# Patient Record
Sex: MALE | Race: WHITE | NOT HISPANIC OR LATINO
[De-identification: names, ages, dates, MRNs, and addresses within clinical notes are randomized per-mention and may not be internally consistent; named-entity substitution may affect disease eponyms.]

---

## 2019-02-14 ENCOUNTER — APPOINTMENT (OUTPATIENT)
Dept: UROLOGY | Facility: CLINIC | Age: 79
End: 2019-02-14
Payer: MEDICARE

## 2019-02-14 VITALS — TEMPERATURE: 97.5 F | SYSTOLIC BLOOD PRESSURE: 113 MMHG | HEART RATE: 86 BPM | DIASTOLIC BLOOD PRESSURE: 71 MMHG

## 2019-02-14 DIAGNOSIS — F52.21 MALE ERECTILE DISORDER: ICD-10-CM

## 2019-02-14 DIAGNOSIS — I25.10 ATHEROSCLEROTIC HEART DISEASE OF NATIVE CORONARY ARTERY W/OUT ANGINA PECTORIS: ICD-10-CM

## 2019-02-14 DIAGNOSIS — Z78.9 OTHER SPECIFIED HEALTH STATUS: ICD-10-CM

## 2019-02-14 PROCEDURE — 99204 OFFICE O/P NEW MOD 45 MIN: CPT

## 2019-02-14 NOTE — PHYSICAL EXAM
[General Appearance - Well Developed] : well developed [General Appearance - Well Nourished] : well nourished [Normal Appearance] : normal appearance [Well Groomed] : well groomed [General Appearance - In No Acute Distress] : no acute distress [Edema] : no peripheral edema [Respiration, Rhythm And Depth] : normal respiratory rhythm and effort [Exaggerated Use Of Accessory Muscles For Inspiration] : no accessory muscle use [Abdomen Soft] : soft [Abdomen Tenderness] : non-tender [Costovertebral Angle Tenderness] : no ~M costovertebral angle tenderness [Urethral Meatus] : meatus normal [Penis Abnormality] : normal circumcised penis [Urinary Bladder Findings] : the bladder was normal on palpation [Scrotum] : the scrotum was normal [Testes Mass (___cm)] : there were no testicular masses [Rectal Exam - Rectum] : digital rectal exam was normal [No Prostate Nodules] : no prostate nodules [Prostate Size ___ (0-4)] : prostate size [unfilled] (scale: 0-4) [Normal Station and Gait] : the gait and station were normal for the patient's age [FreeTextEntry1] : no spinal or bony tenderness [] : no rash [No Focal Deficits] : no focal deficits [Oriented To Time, Place, And Person] : oriented to person, place, and time [Affect] : the affect was normal [Mood] : the mood was normal [Not Anxious] : not anxious [No Palpable Adenopathy] : no palpable adenopathy

## 2019-02-14 NOTE — REVIEW OF SYSTEMS
[Feeling Tired] : feeling tired [Recent Weight Gain (___ Lbs)] : recent [unfilled] ~Ulb weight gain [Shortness Of Breath] : shortness of breath [Cough] : cough [Pain during urination] : pain during urination [Difficulty Walking] : difficulty walking [Negative] : Heme/Lymph

## 2019-02-14 NOTE — ASSESSMENT
[FreeTextEntry1] : PSA elevated given avodart effect- 5.84 on lab work is more c/w true number aprox 11-12.\par \par We had long discussion about the PSA number, risks of prostate cancer vs. benign disease, and relative risks of a possible prostate cancer vs. proceeding with biopsy or therapeutic intervention given age 79, need for anticoagulation, with sig CKD, CAD, h/o CHF.  \par \par Overall, risks of biopsy and intervention likely higher than risks of observation and conservative management.  Would not recommend aggressive evaluation or management, but would reasonably f/u psa.\par \par Pt and daughter agree, conservative given age, health risks\par Cont dutasteride\par RTC 6 months with f/u and psa

## 2019-02-14 NOTE — HISTORY OF PRESENT ILLNESS
[FreeTextEntry1] : Pt is 78 yo male, last seen > 3 years ago for ED concerns and BPH with obstruction.  Remains on avodart- stopped flomax since last appt.  No f/u since 1/2016.  Now returns because was told psa was elevated. \par \par Recent labs: creatinine 2.0 (egfr 32), PSA 5.84 (on dutasteride).\par \par No voiding complaints at this time; some weakness of the stream.  No dysuria, no hematuria.  Nocturia 1/night. No flank or abdominal pain.\par \par PMH: mult medical issues, including CKD, CAD (s/p 3 stents), htn, CHF as well, skin cancer\par psh: cardiac stenting, previous inguinal hernia repair, excision skin cancer\par nkda\par meds: includes spironolactone, xarelto [Weak Stream] : weak stream

## 2019-05-16 ENCOUNTER — APPOINTMENT (OUTPATIENT)
Dept: HEART AND VASCULAR | Facility: CLINIC | Age: 79
End: 2019-05-16
Payer: MEDICARE

## 2019-05-16 VITALS
DIASTOLIC BLOOD PRESSURE: 70 MMHG | SYSTOLIC BLOOD PRESSURE: 110 MMHG | BODY MASS INDEX: 23.23 KG/M2 | HEIGHT: 67 IN | WEIGHT: 148 LBS

## 2019-05-16 VITALS — SYSTOLIC BLOOD PRESSURE: 110 MMHG | HEART RATE: 70 BPM | DIASTOLIC BLOOD PRESSURE: 70 MMHG

## 2019-05-16 DIAGNOSIS — Z00.00 ENCOUNTER FOR GENERAL ADULT MEDICAL EXAMINATION W/OUT ABNORMAL FINDINGS: ICD-10-CM

## 2019-05-16 PROCEDURE — 93000 ELECTROCARDIOGRAM COMPLETE: CPT

## 2019-05-16 PROCEDURE — 99214 OFFICE O/P EST MOD 30 MIN: CPT

## 2019-05-16 PROCEDURE — 93306 TTE W/DOPPLER COMPLETE: CPT

## 2019-05-16 RX ORDER — RIVAROXABAN 2.5 MG/1
TABLET, FILM COATED ORAL
Refills: 0 | Status: DISCONTINUED | COMMUNITY
End: 2019-05-16

## 2019-05-17 LAB
ANION GAP SERPL CALC-SCNC: 13 MMOL/L
BUN SERPL-MCNC: 24 MG/DL
CALCIUM SERPL-MCNC: 9.6 MG/DL
CHLORIDE SERPL-SCNC: 98 MMOL/L
CO2 SERPL-SCNC: 30 MMOL/L
CREAT SERPL-MCNC: 1.5 MG/DL
GLUCOSE SERPL-MCNC: 244 MG/DL
POTASSIUM SERPL-SCNC: 4.1 MMOL/L
SODIUM SERPL-SCNC: 141 MMOL/L

## 2019-05-20 ENCOUNTER — MOBILE ON CALL (OUTPATIENT)
Age: 79
End: 2019-05-20

## 2019-05-20 LAB — NT-PROBNP SERPL-MCNC: 1545 PG/ML

## 2019-06-13 ENCOUNTER — RESULT CHARGE (OUTPATIENT)
Age: 79
End: 2019-06-13

## 2019-06-13 NOTE — HISTORY OF PRESENT ILLNESS
[FreeTextEntry1] : Patient is an 79-year-old male with a known history of ischemic cardiomyopathy reduced LV function status post MI who has refused a defibrillator in the past. He also has chronic atrial fibrillation diabetes and hyperlipidemia. He presents now with worsening symptoms of cough worse at night when lying in bed associated with shortness of breath. He denies any diaphoresis dizziness lightheadedness syncope or near-syncope. He apparently was recently seen in a local emergency room and had a CAT scan which was suggestive of bronchiectasis and was referred to a pulmonologist for further evaluation. He denies any current symptoms of leg edema or abdominal distention\par Patient has relif of cough with home nebulizer of albuterol

## 2019-06-13 NOTE — ASSESSMENT
[FreeTextEntry1] : No signs of edema on exam \par Has diffuse rales onexam with no JVD \par estrella order BNP and CXr before adding addition Torsemide \par Patient is in need of ICD (refused in past)

## 2019-06-25 ENCOUNTER — APPOINTMENT (OUTPATIENT)
Dept: HEART AND VASCULAR | Facility: CLINIC | Age: 79
End: 2019-06-25
Payer: MEDICARE

## 2019-06-25 VITALS — BODY MASS INDEX: 22.6 KG/M2 | WEIGHT: 144 LBS | HEIGHT: 67 IN

## 2019-06-25 VITALS — OXYGEN SATURATION: 95 %

## 2019-06-25 VITALS — SYSTOLIC BLOOD PRESSURE: 105 MMHG | DIASTOLIC BLOOD PRESSURE: 70 MMHG

## 2019-06-25 PROCEDURE — 93000 ELECTROCARDIOGRAM COMPLETE: CPT

## 2019-06-25 RX ORDER — ALBUTEROL SULFATE 0.63 MG/3ML
0.63 SOLUTION RESPIRATORY (INHALATION)
Refills: 0 | Status: ACTIVE | COMMUNITY

## 2019-06-25 NOTE — HISTORY OF PRESENT ILLNESS
[FreeTextEntry1] : the patient is an elderly man with a known history of ischemic cardiomyopathy who was seen approximately one month ago for cough and shortness of breath. The assessment at that time was more consistent with a primary pulmonary etiology as yet no signs of peripheral edema or other stigmata of CHF. Patient was seen by a pulmonary physician placed on oral antibiotics as well as oral steroids. We subsequently was admitted to [facility] where that treatment was continued apparently there were some fluctuations in his heart rate unclear of the true nature of that problem. His breathing has somewhat improved he uses a home nebulizer 4 times a day with other inhalers. He currently still has moderate dyspnea with walking improved from one month ago.\par renal fx was noted to be elevated

## 2019-06-25 NOTE — PHYSICAL EXAM
[General Appearance - Well Developed] : well developed [Normal Appearance] : normal appearance [General Appearance - Well Nourished] : well nourished [Well Groomed] : well groomed [General Appearance - In No Acute Distress] : no acute distress [No Deformities] : no deformities [Eyelids - No Xanthelasma] : the eyelids demonstrated no xanthelasmas [Normal Conjunctiva] : the conjunctiva exhibited no abnormalities [No Oral Pallor] : no oral pallor [Normal Oral Mucosa] : normal oral mucosa [Normal Jugular Venous V Waves Present] : normal jugular venous V waves present [No Oral Cyanosis] : no oral cyanosis [Normal Jugular Venous A Waves Present] : normal jugular venous A waves present [No Jugular Venous Underwood A Waves] : no jugular venous underwood A waves [Abdomen Soft] : soft [Abdomen Tenderness] : non-tender [Abnormal Walk] : normal gait [Abdomen Mass (___ Cm)] : no abdominal mass palpated [Gait - Sufficient For Exercise Testing] : the gait was sufficient for exercise testing [Normal S1] : normal S1 [Normal S2] : normal S2 [II] : a grade 2 [___ +] : bilateral [unfilled]U+ pretibial pitting edema [Bibasilar Rales/Crackles] : bibasilar rales were heard [Nail Clubbing] : no clubbing of the fingernails [Cyanosis, Localized] : no localized cyanosis [Petechial Hemorrhages (___cm)] : no petechial hemorrhages [Skin Color & Pigmentation] : normal skin color and pigmentation [] : no rash [No Venous Stasis] : no venous stasis [Skin Lesions] : no skin lesions [No Skin Ulcers] : no skin ulcer [No Xanthoma] : no  xanthoma was observed [S4] : no S4 [S3] : no S3

## 2019-06-25 NOTE — ASSESSMENT
[FreeTextEntry1] : \par Patient is in need of ICD (refused in past)\par refused to take aldactone (no clear reason) \par torsemide 20 mg qd

## 2019-06-26 LAB
ANION GAP SERPL CALC-SCNC: 13 MMOL/L
BUN SERPL-MCNC: 31 MG/DL
CALCIUM SERPL-MCNC: 9 MG/DL
CHLORIDE SERPL-SCNC: 100 MMOL/L
CO2 SERPL-SCNC: 26 MMOL/L
CREAT SERPL-MCNC: 1.5 MG/DL
GLUCOSE SERPL-MCNC: 296 MG/DL
POTASSIUM SERPL-SCNC: 4.3 MMOL/L
SODIUM SERPL-SCNC: 139 MMOL/L

## 2019-08-19 ENCOUNTER — APPOINTMENT (OUTPATIENT)
Dept: HEART AND VASCULAR | Facility: CLINIC | Age: 79
End: 2019-08-19
Payer: MEDICARE

## 2019-08-19 VITALS — BODY MASS INDEX: 8.32 KG/M2 | DIASTOLIC BLOOD PRESSURE: 60 MMHG | HEIGHT: 78 IN | SYSTOLIC BLOOD PRESSURE: 90 MMHG

## 2019-08-19 VITALS — WEIGHT: 138 LBS | BODY MASS INDEX: 21.66 KG/M2 | HEIGHT: 67 IN

## 2019-08-19 VITALS — OXYGEN SATURATION: 92 %

## 2019-08-19 PROCEDURE — 93000 ELECTROCARDIOGRAM COMPLETE: CPT

## 2019-08-20 LAB
ANION GAP SERPL CALC-SCNC: 11 MMOL/L
BUN SERPL-MCNC: 25 MG/DL
CALCIUM SERPL-MCNC: 9.7 MG/DL
CHLORIDE SERPL-SCNC: 98 MMOL/L
CO2 SERPL-SCNC: 32 MMOL/L
CREAT SERPL-MCNC: 1.6 MG/DL
GLUCOSE SERPL-MCNC: 153 MG/DL
NT-PROBNP SERPL-MCNC: 2371 PG/ML
POTASSIUM SERPL-SCNC: 4 MMOL/L
SODIUM SERPL-SCNC: 141 MMOL/L

## 2019-08-20 NOTE — ASSESSMENT
[FreeTextEntry1] : \par combination of copd and chf \par Low Bp at present \par would keep on torsemide at 20 mg qd \par spoke to Pulm Dr Cisneros about futher optimizing COPD condition \par he will add inhaler\par cxr ordered \par lytes w creat ordered \par may consider ACE or ARB if bp can tolerate

## 2019-08-20 NOTE — PHYSICAL EXAM
[General Appearance - Well Developed] : well developed [Normal Appearance] : normal appearance [Well Groomed] : well groomed [General Appearance - Well Nourished] : well nourished [No Deformities] : no deformities [General Appearance - In No Acute Distress] : no acute distress [Normal Conjunctiva] : the conjunctiva exhibited no abnormalities [Eyelids - No Xanthelasma] : the eyelids demonstrated no xanthelasmas [Normal Oral Mucosa] : normal oral mucosa [No Oral Cyanosis] : no oral cyanosis [No Oral Pallor] : no oral pallor [Normal Jugular Venous A Waves Present] : normal jugular venous A waves present [Normal Jugular Venous V Waves Present] : normal jugular venous V waves present [No Jugular Venous Underwood A Waves] : no jugular venous underwood A waves [Abdomen Soft] : soft [Abdomen Tenderness] : non-tender [Abdomen Mass (___ Cm)] : no abdominal mass palpated [Abnormal Walk] : normal gait [Gait - Sufficient For Exercise Testing] : the gait was sufficient for exercise testing [Cyanosis, Localized] : no localized cyanosis [Nail Clubbing] : no clubbing of the fingernails [Petechial Hemorrhages (___cm)] : no petechial hemorrhages [Skin Color & Pigmentation] : normal skin color and pigmentation [] : no rash [No Venous Stasis] : no venous stasis [No Skin Ulcers] : no skin ulcer [Skin Lesions] : no skin lesions [Normal S1] : normal S1 [No Xanthoma] : no  xanthoma was observed [Normal S2] : normal S2 [II] : a grade 2 [___ +] : bilateral [unfilled]U+ pretibial pitting edema [Bibasilar Rales/Crackles] : bibasilar rales were heard [S3] : no S3 [S4] : no S4

## 2019-08-20 NOTE — HISTORY OF PRESENT ILLNESS
[FreeTextEntry1] : 79-year-old male with known ischemic myopathy as well as emphysema status post admission to [facility] for what appears to be cough and shortness of breath unclear where he was treated for the air his x-ray was consistent with COPD and bilateral L. atelectasis no congestive changes were noted.ON torsemide BID lost 10 lbs

## 2019-08-21 ENCOUNTER — APPOINTMENT (OUTPATIENT)
Age: 79
End: 2019-08-21
Payer: MEDICARE

## 2019-08-21 PROCEDURE — 99213 OFFICE O/P EST LOW 20 MIN: CPT

## 2019-08-21 NOTE — PHYSICAL EXAM
[General Appearance - Well Developed] : well developed [Normal Appearance] : normal appearance [General Appearance - Well Nourished] : well nourished [General Appearance - In No Acute Distress] : no acute distress [Well Groomed] : well groomed [Costovertebral Angle Tenderness] : no ~M costovertebral angle tenderness [Abdomen Soft] : soft [Abdomen Tenderness] : non-tender [Respiration, Rhythm And Depth] : normal respiratory rhythm and effort [] : no respiratory distress [Oriented To Time, Place, And Person] : oriented to person, place, and time [Exaggerated Use Of Accessory Muscles For Inspiration] : no accessory muscle use [Affect] : the affect was normal [Not Anxious] : not anxious [Mood] : the mood was normal [Normal Station and Gait] : the gait and station were normal for the patient's age [No Focal Deficits] : no focal deficits

## 2019-08-21 NOTE — HISTORY OF PRESENT ILLNESS
[FreeTextEntry1] : Pt returns in 6 month f/u---> now 80 yo male.  Doing well, though has some new lower leg swelling.  H/o BPH with incomplete emptying, currently on dutasteride and alfuzosin.  [facility] told he has 'lung inflammation'.\par \par O/w voiding well at this time.\par \par Creatinine recently 1.6 8/19/19, and at [facility]---> improved compared with 6 months ago.\par \par No recent PSA f/u, though previously elevated for age. [None] : no symptoms

## 2019-08-21 NOTE — ASSESSMENT
[FreeTextEntry1] : D/w pt- will check PSA.\par \par Offered to do today, or with next draw.\par \par WIll f/u by phone.\par \par RTC 6 months with u/a, bladder scan, full exam, and will repeat psa at that time.

## 2019-09-03 ENCOUNTER — APPOINTMENT (OUTPATIENT)
Dept: HEART AND VASCULAR | Facility: CLINIC | Age: 79
End: 2019-09-03

## 2019-09-03 LAB
PSA FREE FLD-MCNC: 19 %
PSA FREE SERPL-MCNC: 1.06 NG/ML
PSA SERPL-MCNC: 5.73 NG/ML

## 2019-09-10 ENCOUNTER — APPOINTMENT (OUTPATIENT)
Dept: HEART AND VASCULAR | Facility: CLINIC | Age: 79
End: 2019-09-10
Payer: MEDICARE

## 2019-09-10 ENCOUNTER — NON-APPOINTMENT (OUTPATIENT)
Age: 79
End: 2019-09-10

## 2019-09-10 VITALS
OXYGEN SATURATION: 93 % | SYSTOLIC BLOOD PRESSURE: 95 MMHG | DIASTOLIC BLOOD PRESSURE: 60 MMHG | HEART RATE: 93 BPM | RESPIRATION RATE: 14 BRPM

## 2019-09-10 VITALS — WEIGHT: 135 LBS | BODY MASS INDEX: 21.19 KG/M2 | HEIGHT: 67 IN

## 2019-09-10 DIAGNOSIS — J47.9 BRONCHIECTASIS, UNCOMPLICATED: ICD-10-CM

## 2019-09-10 PROCEDURE — 93000 ELECTROCARDIOGRAM COMPLETE: CPT

## 2019-09-10 PROCEDURE — 99214 OFFICE O/P EST MOD 30 MIN: CPT

## 2019-09-10 NOTE — PHYSICAL EXAM
[General Appearance - Well Developed] : well developed [Normal Appearance] : normal appearance [Well Groomed] : well groomed [General Appearance - Well Nourished] : well nourished [No Deformities] : no deformities [General Appearance - In No Acute Distress] : no acute distress [Normal Conjunctiva] : the conjunctiva exhibited no abnormalities [Eyelids - No Xanthelasma] : the eyelids demonstrated no xanthelasmas [Normal Oral Mucosa] : normal oral mucosa [No Oral Pallor] : no oral pallor [No Oral Cyanosis] : no oral cyanosis [Normal Jugular Venous A Waves Present] : normal jugular venous A waves present [Normal Jugular Venous V Waves Present] : normal jugular venous V waves present [No Jugular Venous Underwood A Waves] : no jugular venous underwood A waves [Abdomen Soft] : soft [Abdomen Tenderness] : non-tender [Abdomen Mass (___ Cm)] : no abdominal mass palpated [Abnormal Walk] : normal gait [Gait - Sufficient For Exercise Testing] : the gait was sufficient for exercise testing [Nail Clubbing] : no clubbing of the fingernails [Cyanosis, Localized] : no localized cyanosis [Petechial Hemorrhages (___cm)] : no petechial hemorrhages [Skin Color & Pigmentation] : normal skin color and pigmentation [] : no rash [No Venous Stasis] : no venous stasis [Skin Lesions] : no skin lesions [No Skin Ulcers] : no skin ulcer [No Xanthoma] : no  xanthoma was observed [Normal S1] : normal S1 [Normal S2] : normal S2 [II] : a grade 2 [___ +] : bilateral [unfilled]U+ pretibial pitting edema [Bibasilar Rales/Crackles] : bibasilar rales were heard [S4] : no S4 [S3] : no S3

## 2019-09-10 NOTE — ASSESSMENT
[FreeTextEntry1] : \par combination of copd and chf \par Low Bp at present \par would keep on torsemide at 20 mg qd \par \par \par lytes w creat ordered \par may consider ACE or ARB if bp can tolerate

## 2019-09-10 NOTE — HISTORY OF PRESENT ILLNESS
[FreeTextEntry1] : 79-year-old male with known history of ischemic coronary myopathy chronic atrial fibrillation and bronchiectasis who returns for routine followup. He was recently placed on some inhalers which have helped him with his cough and dyspnea. He is approximately one and a half pillow orthopnea he denies any edema he denies any chest pain or palpitations. Blood pressure at home has been a marginal level with rare episodes of dizziness

## 2019-09-11 LAB
ANION GAP SERPL CALC-SCNC: 18 MMOL/L
BUN SERPL-MCNC: 25 MG/DL
CALCIUM SERPL-MCNC: 9.7 MG/DL
CHLORIDE SERPL-SCNC: 99 MMOL/L
CO2 SERPL-SCNC: 25 MMOL/L
CREAT SERPL-MCNC: 1.36 MG/DL
GLUCOSE SERPL-MCNC: 169 MG/DL
NT-PROBNP SERPL-MCNC: 1591 PG/ML
POTASSIUM SERPL-SCNC: 4.2 MMOL/L
SODIUM SERPL-SCNC: 142 MMOL/L

## 2019-10-08 ENCOUNTER — APPOINTMENT (OUTPATIENT)
Dept: HEART AND VASCULAR | Facility: CLINIC | Age: 79
End: 2019-10-08

## 2019-12-12 ENCOUNTER — APPOINTMENT (OUTPATIENT)
Dept: HEART AND VASCULAR | Facility: CLINIC | Age: 79
End: 2019-12-12

## 2019-12-17 ENCOUNTER — APPOINTMENT (OUTPATIENT)
Dept: UROLOGY | Facility: CLINIC | Age: 79
End: 2019-12-17
Payer: MEDICARE

## 2019-12-17 DIAGNOSIS — R97.20 ELEVATED PROSTATE, SPECIFIC ANTIGEN [PSA]: ICD-10-CM

## 2019-12-17 LAB
BILIRUB UR QL STRIP: NORMAL
CLARITY UR: CLEAR
COLLECTION METHOD: NORMAL
GLUCOSE UR-MCNC: NORMAL
HCG UR QL: 0.2 EU/DL
HGB UR QL STRIP.AUTO: NORMAL
KETONES UR-MCNC: NORMAL
LEUKOCYTE ESTERASE UR QL STRIP: NORMAL
NITRITE UR QL STRIP: NORMAL
PH UR STRIP: 5.5
PROT UR STRIP-MCNC: NORMAL
SP GR UR STRIP: 1.01

## 2019-12-17 PROCEDURE — 51798 US URINE CAPACITY MEASURE: CPT

## 2019-12-17 PROCEDURE — 99214 OFFICE O/P EST MOD 30 MIN: CPT | Mod: 25

## 2019-12-17 PROCEDURE — 81003 URINALYSIS AUTO W/O SCOPE: CPT | Mod: QW

## 2019-12-17 NOTE — PHYSICAL EXAM
[General Appearance - Well Developed] : well developed [General Appearance - Well Nourished] : well nourished [Normal Appearance] : normal appearance [Well Groomed] : well groomed [Abdomen Soft] : soft [General Appearance - In No Acute Distress] : no acute distress [Abdomen Tenderness] : non-tender [Costovertebral Angle Tenderness] : no ~M costovertebral angle tenderness [] : no respiratory distress [Edema] : no peripheral edema [Respiration, Rhythm And Depth] : normal respiratory rhythm and effort [Exaggerated Use Of Accessory Muscles For Inspiration] : no accessory muscle use [Mood] : the mood was normal [Affect] : the affect was normal [Oriented To Time, Place, And Person] : oriented to person, place, and time [Not Anxious] : not anxious [Normal Station and Gait] : the gait and station were normal for the patient's age [No Focal Deficits] : no focal deficits [Urethral Meatus] : meatus normal [Penis Abnormality] : normal circumcised penis [Urinary Bladder Findings] : the bladder was normal on palpation [Scrotum] : the scrotum was normal [Testes Mass (___cm)] : there were no testicular masses [No Prostate Nodules] : no prostate nodules [Rectal Exam - Rectum] : digital rectal exam was normal [Prostate Size ___ (0-4)] : prostate size [unfilled] (scale: 0-4) [FreeTextEntry1] : no nodules or induration

## 2019-12-17 NOTE — ASSESSMENT
[FreeTextEntry1] : u/a: wnl today\par bladder scan: 0 cc pvr\par \par 1. repeat PSA today as pt prefers to know if any changes given last number, though I have assured him that 5.73 is wnl for age related range for prostate\par 2. cont on dutasteride, alfuzosin\par 3. RTC 6 months with next eval

## 2019-12-17 NOTE — HISTORY OF PRESENT ILLNESS
[None] : no symptoms [FreeTextEntry1] : Pt returns in ~4 months f/u---> now 78 yo male.  Doing well, though has some new lower leg swelling.  H/o BPH with incomplete emptying, currently on dutasteride and alfuzosin. \par \par O/w voiding well at this time, notes occasional burning sensation  Feels he is emptying well.\par \par Creatinine recently 1.6 8/19/19, and at [facility]---> improved compared with 6 months ago.\par \par PSA hx: 5.73 --- 9/3/19 (free 19%) \par \par Feels alfuzosin and dutasteride 'helping'.

## 2019-12-18 LAB — PSA SERPL-MCNC: 4.03 NG/ML

## 2019-12-30 ENCOUNTER — APPOINTMENT (OUTPATIENT)
Dept: HEART AND VASCULAR | Facility: CLINIC | Age: 79
End: 2019-12-30

## 2020-01-07 ENCOUNTER — APPOINTMENT (OUTPATIENT)
Dept: HEART AND VASCULAR | Facility: CLINIC | Age: 80
End: 2020-01-07

## 2020-02-05 ENCOUNTER — RX RENEWAL (OUTPATIENT)
Age: 80
End: 2020-02-05

## 2020-02-26 ENCOUNTER — APPOINTMENT (OUTPATIENT)
Dept: UROLOGY | Facility: CLINIC | Age: 80
End: 2020-02-26

## 2020-07-23 ENCOUNTER — APPOINTMENT (OUTPATIENT)
Dept: UROLOGY | Facility: CLINIC | Age: 80
End: 2020-07-23

## 2021-01-06 ENCOUNTER — APPOINTMENT (OUTPATIENT)
Dept: HEART AND VASCULAR | Facility: CLINIC | Age: 81
End: 2021-01-06
Payer: MEDICARE

## 2021-01-06 DIAGNOSIS — I48.91 UNSPECIFIED ATRIAL FIBRILLATION: ICD-10-CM

## 2021-01-06 DIAGNOSIS — I25.5 ISCHEMIC CARDIOMYOPATHY: ICD-10-CM

## 2021-01-06 DIAGNOSIS — N18.30 CHRONIC KIDNEY DISEASE, STAGE 3 UNSPECIFIED: ICD-10-CM

## 2021-01-06 DIAGNOSIS — N40.1 BENIGN PROSTATIC HYPERPLASIA WITH LOWER URINARY TRACT SYMPMS: ICD-10-CM

## 2021-01-06 DIAGNOSIS — E11.9 TYPE 2 DIABETES MELLITUS W/OUT COMPLICATIONS: ICD-10-CM

## 2021-01-06 DIAGNOSIS — N13.8 BENIGN PROSTATIC HYPERPLASIA WITH LOWER URINARY TRACT SYMPMS: ICD-10-CM

## 2021-01-06 PROCEDURE — 99215 OFFICE O/P EST HI 40 MIN: CPT | Mod: 95

## 2021-01-06 RX ORDER — RIVAROXABAN 15 MG/1
15 TABLET, FILM COATED ORAL
Qty: 90 | Refills: 3 | Status: ACTIVE | COMMUNITY

## 2021-01-06 NOTE — ASSESSMENT
[FreeTextEntry1] : Assessment \par CHF appears decompensated on history and limited exam \par Will incresae toremide to bid (on empty stomach) \par will need blood work to follow CKD \par will ask to get appt in office for regular f/u

## 2021-01-06 NOTE — HISTORY OF PRESENT ILLNESS
[FreeTextEntry1] : Seen in past for chf (ischemic )and  AFIB \par Has been home bound and has increase in sob iand edema \par Has 2 pill ow orthopnea and cough at nite \par no sscp or neck pain \par leg edema worse over 2 mos \par does not have scale a home \par on torsemide 20 mg  qd \par uses ntg prn for chest pain and palpation \par uses ntg -12 x qweek \par compliant w xarelto \par Had covid in 4/2020 ( 3days)and was in hospital  for pneumonia AB status pos for AB  [Home] : at home, [unfilled] , at the time of the visit. [Medical Office: ([facility])___] : at the medical office located in  [Family Member] : family member [Verbal consent obtained from patient] : the patient, [unfilled]

## 2021-01-06 NOTE — PHYSICAL EXAM
[Normal Conjunctiva] : the conjunctiva exhibited no abnormalities [Eyelids - No Xanthelasma] : the eyelids demonstrated no xanthelasmas [Normal Oral Mucosa] : normal oral mucosa [No Oral Pallor] : no oral pallor [No Oral Cyanosis] : no oral cyanosis [Normal Jugular Venous A Waves Present] : normal jugular venous A waves present [Normal Jugular Venous V Waves Present] : normal jugular venous V waves present [No Jugular Venous Underwood A Waves] : no jugular venous underwood A waves [] : no respiratory distress [Respiration, Rhythm And Depth] : normal respiratory rhythm and effort [Exaggerated Use Of Accessory Muscles For Inspiration] : no accessory muscle use [FreeTextEntry1] : 3 + edema bilat

## 2021-01-20 ENCOUNTER — RX RENEWAL (OUTPATIENT)
Age: 81
End: 2021-01-20

## 2021-01-25 ENCOUNTER — APPOINTMENT (OUTPATIENT)
Dept: HEART AND VASCULAR | Facility: CLINIC | Age: 81
End: 2021-01-25

## 2022-04-14 ENCOUNTER — APPOINTMENT (OUTPATIENT)
Dept: UROLOGY | Facility: CLINIC | Age: 82
End: 2022-04-14

## 2022-12-27 ENCOUNTER — APPOINTMENT (OUTPATIENT)
Dept: HEART AND VASCULAR | Facility: CLINIC | Age: 82
End: 2022-12-27

## 2023-02-12 ENCOUNTER — INPATIENT (INPATIENT)
Facility: HOSPITAL | Age: 83
LOS: 5 days | Discharge: HOME HEALTH | End: 2023-02-18
Attending: STUDENT IN AN ORGANIZED HEALTH CARE EDUCATION/TRAINING PROGRAM | Admitting: STUDENT IN AN ORGANIZED HEALTH CARE EDUCATION/TRAINING PROGRAM
Payer: MEDICARE

## 2023-02-12 VITALS
HEART RATE: 130 BPM | SYSTOLIC BLOOD PRESSURE: 115 MMHG | RESPIRATION RATE: 36 BRPM | DIASTOLIC BLOOD PRESSURE: 73 MMHG | OXYGEN SATURATION: 93 %

## 2023-02-12 DIAGNOSIS — Z29.9 ENCOUNTER FOR PROPHYLACTIC MEASURES, UNSPECIFIED: ICD-10-CM

## 2023-02-12 DIAGNOSIS — I48.91 UNSPECIFIED ATRIAL FIBRILLATION: ICD-10-CM

## 2023-02-12 DIAGNOSIS — J96.01 ACUTE RESPIRATORY FAILURE WITH HYPOXIA: ICD-10-CM

## 2023-02-12 DIAGNOSIS — I10 ESSENTIAL (PRIMARY) HYPERTENSION: ICD-10-CM

## 2023-02-12 DIAGNOSIS — J18.9 PNEUMONIA, UNSPECIFIED ORGANISM: ICD-10-CM

## 2023-02-12 DIAGNOSIS — J44.9 CHRONIC OBSTRUCTIVE PULMONARY DISEASE, UNSPECIFIED: ICD-10-CM

## 2023-02-12 DIAGNOSIS — I50.23 ACUTE ON CHRONIC SYSTOLIC (CONGESTIVE) HEART FAILURE: ICD-10-CM

## 2023-02-12 DIAGNOSIS — E11.9 TYPE 2 DIABETES MELLITUS WITHOUT COMPLICATIONS: ICD-10-CM

## 2023-02-12 DIAGNOSIS — I50.9 HEART FAILURE, UNSPECIFIED: ICD-10-CM

## 2023-02-12 DIAGNOSIS — N17.9 ACUTE KIDNEY FAILURE, UNSPECIFIED: ICD-10-CM

## 2023-02-12 LAB
A1C WITH ESTIMATED AVERAGE GLUCOSE RESULT: 7.7 % — HIGH (ref 4–5.6)
ALBUMIN SERPL ELPH-MCNC: 3.4 G/DL — SIGNIFICANT CHANGE UP (ref 3.3–5)
ALP SERPL-CCNC: 94 U/L — SIGNIFICANT CHANGE UP (ref 40–120)
ALT FLD-CCNC: 14 U/L — SIGNIFICANT CHANGE UP (ref 4–41)
ANION GAP SERPL CALC-SCNC: 12 MMOL/L — SIGNIFICANT CHANGE UP (ref 7–14)
ANION GAP SERPL CALC-SCNC: 15 MMOL/L — HIGH (ref 7–14)
ANION GAP SERPL CALC-SCNC: 15 MMOL/L — HIGH (ref 7–14)
ANION GAP SERPL CALC-SCNC: 18 MMOL/L — HIGH (ref 7–14)
APPEARANCE UR: CLEAR — SIGNIFICANT CHANGE UP
APTT BLD: 40.5 SEC — HIGH (ref 27–36.3)
AST SERPL-CCNC: 41 U/L — HIGH (ref 4–40)
BACTERIA # UR AUTO: NEGATIVE — SIGNIFICANT CHANGE UP
BASE EXCESS BLDV CALC-SCNC: -0.6 MMOL/L — SIGNIFICANT CHANGE UP (ref -2–3)
BASE EXCESS BLDV CALC-SCNC: 0.1 MMOL/L — SIGNIFICANT CHANGE UP (ref -2–3)
BASE EXCESS BLDV CALC-SCNC: 1 MMOL/L — SIGNIFICANT CHANGE UP (ref -2–3)
BASE EXCESS BLDV CALC-SCNC: 2 MMOL/L — SIGNIFICANT CHANGE UP (ref -2–3)
BASOPHILS # BLD AUTO: 0.03 K/UL — SIGNIFICANT CHANGE UP (ref 0–0.2)
BASOPHILS NFR BLD AUTO: 0.2 % — SIGNIFICANT CHANGE UP (ref 0–2)
BILIRUB SERPL-MCNC: 0.8 MG/DL — SIGNIFICANT CHANGE UP (ref 0.2–1.2)
BILIRUB UR-MCNC: NEGATIVE — SIGNIFICANT CHANGE UP
BLOOD GAS VENOUS COMPREHENSIVE RESULT: SIGNIFICANT CHANGE UP
BUN SERPL-MCNC: 68 MG/DL — HIGH (ref 7–23)
BUN SERPL-MCNC: 71 MG/DL — HIGH (ref 7–23)
BUN SERPL-MCNC: 77 MG/DL — HIGH (ref 7–23)
BUN SERPL-MCNC: 80 MG/DL — HIGH (ref 7–23)
CALCIUM SERPL-MCNC: 8.1 MG/DL — LOW (ref 8.4–10.5)
CALCIUM SERPL-MCNC: 9.1 MG/DL — SIGNIFICANT CHANGE UP (ref 8.4–10.5)
CALCIUM SERPL-MCNC: 9.3 MG/DL — SIGNIFICANT CHANGE UP (ref 8.4–10.5)
CALCIUM SERPL-MCNC: 9.4 MG/DL — SIGNIFICANT CHANGE UP (ref 8.4–10.5)
CHLORIDE BLDV-SCNC: 101 MMOL/L — SIGNIFICANT CHANGE UP (ref 96–108)
CHLORIDE BLDV-SCNC: 101 MMOL/L — SIGNIFICANT CHANGE UP (ref 96–108)
CHLORIDE BLDV-SCNC: 103 MMOL/L — SIGNIFICANT CHANGE UP (ref 96–108)
CHLORIDE BLDV-SCNC: 104 MMOL/L — SIGNIFICANT CHANGE UP (ref 96–108)
CHLORIDE SERPL-SCNC: 97 MMOL/L — LOW (ref 98–107)
CHLORIDE SERPL-SCNC: 98 MMOL/L — SIGNIFICANT CHANGE UP (ref 98–107)
CHLORIDE SERPL-SCNC: 98 MMOL/L — SIGNIFICANT CHANGE UP (ref 98–107)
CHLORIDE SERPL-SCNC: 99 MMOL/L — SIGNIFICANT CHANGE UP (ref 98–107)
CO2 BLDV-SCNC: 29.9 MMOL/L — HIGH (ref 22–26)
CO2 BLDV-SCNC: 30.3 MMOL/L — HIGH (ref 22–26)
CO2 BLDV-SCNC: 31 MMOL/L — HIGH (ref 22–26)
CO2 BLDV-SCNC: 33.2 MMOL/L — HIGH (ref 22–26)
CO2 SERPL-SCNC: 19 MMOL/L — LOW (ref 22–31)
CO2 SERPL-SCNC: 22 MMOL/L — SIGNIFICANT CHANGE UP (ref 22–31)
CO2 SERPL-SCNC: 24 MMOL/L — SIGNIFICANT CHANGE UP (ref 22–31)
CO2 SERPL-SCNC: 24 MMOL/L — SIGNIFICANT CHANGE UP (ref 22–31)
COLOR SPEC: SIGNIFICANT CHANGE UP
CREAT SERPL-MCNC: 2.22 MG/DL — HIGH (ref 0.5–1.3)
CREAT SERPL-MCNC: 2.5 MG/DL — HIGH (ref 0.5–1.3)
CREAT SERPL-MCNC: 2.65 MG/DL — HIGH (ref 0.5–1.3)
CREAT SERPL-MCNC: 2.78 MG/DL — HIGH (ref 0.5–1.3)
DIFF PNL FLD: NEGATIVE — SIGNIFICANT CHANGE UP
DIGOXIN SERPL-MCNC: 0.7 NG/ML — LOW (ref 0.8–2)
EGFR: 22 ML/MIN/1.73M2 — LOW
EGFR: 23 ML/MIN/1.73M2 — LOW
EGFR: 25 ML/MIN/1.73M2 — LOW
EGFR: 28 ML/MIN/1.73M2 — LOW
EOSINOPHIL # BLD AUTO: 0.01 K/UL — SIGNIFICANT CHANGE UP (ref 0–0.5)
EOSINOPHIL NFR BLD AUTO: 0.1 % — SIGNIFICANT CHANGE UP (ref 0–6)
EPI CELLS # UR: 2 /HPF — SIGNIFICANT CHANGE UP (ref 0–5)
ESTIMATED AVERAGE GLUCOSE: 174 — SIGNIFICANT CHANGE UP
FLUAV AG NPH QL: SIGNIFICANT CHANGE UP
FLUBV AG NPH QL: SIGNIFICANT CHANGE UP
GAS PNL BLDV: 135 MMOL/L — LOW (ref 136–145)
GAS PNL BLDV: 135 MMOL/L — LOW (ref 136–145)
GAS PNL BLDV: 136 MMOL/L — SIGNIFICANT CHANGE UP (ref 136–145)
GAS PNL BLDV: 138 MMOL/L — SIGNIFICANT CHANGE UP (ref 136–145)
GLUCOSE BLDV-MCNC: 255 MG/DL — HIGH (ref 70–99)
GLUCOSE BLDV-MCNC: 262 MG/DL — HIGH (ref 70–99)
GLUCOSE BLDV-MCNC: 279 MG/DL — HIGH (ref 70–99)
GLUCOSE BLDV-MCNC: 296 MG/DL — HIGH (ref 70–99)
GLUCOSE SERPL-MCNC: 240 MG/DL — HIGH (ref 70–99)
GLUCOSE SERPL-MCNC: 245 MG/DL — HIGH (ref 70–99)
GLUCOSE SERPL-MCNC: 258 MG/DL — HIGH (ref 70–99)
GLUCOSE SERPL-MCNC: 267 MG/DL — HIGH (ref 70–99)
GLUCOSE UR QL: NEGATIVE — SIGNIFICANT CHANGE UP
GRAM STN FLD: SIGNIFICANT CHANGE UP
HCO3 BLDV-SCNC: 28 MMOL/L — SIGNIFICANT CHANGE UP (ref 22–29)
HCO3 BLDV-SCNC: 28 MMOL/L — SIGNIFICANT CHANGE UP (ref 22–29)
HCO3 BLDV-SCNC: 29 MMOL/L — SIGNIFICANT CHANGE UP (ref 22–29)
HCO3 BLDV-SCNC: 31 MMOL/L — HIGH (ref 22–29)
HCT VFR BLD CALC: 36.1 % — LOW (ref 39–50)
HCT VFR BLDA CALC: 32 % — LOW (ref 39–51)
HCT VFR BLDA CALC: 33 % — LOW (ref 39–51)
HCT VFR BLDA CALC: 34 % — LOW (ref 39–51)
HCT VFR BLDA CALC: 36 % — LOW (ref 39–51)
HGB BLD CALC-MCNC: 10.6 G/DL — LOW (ref 12.6–17.4)
HGB BLD CALC-MCNC: 10.9 G/DL — LOW (ref 12.6–17.4)
HGB BLD CALC-MCNC: 11.4 G/DL — LOW (ref 12.6–17.4)
HGB BLD CALC-MCNC: 12 G/DL — LOW (ref 12.6–17.4)
HGB BLD-MCNC: 10.5 G/DL — LOW (ref 13–17)
HYALINE CASTS # UR AUTO: 1 /LPF — SIGNIFICANT CHANGE UP (ref 0–7)
IANC: 16.61 K/UL — HIGH (ref 1.8–7.4)
IMM GRANULOCYTES NFR BLD AUTO: 0.6 % — SIGNIFICANT CHANGE UP (ref 0–0.9)
INR BLD: 1.27 RATIO — HIGH (ref 0.88–1.16)
KETONES UR-MCNC: NEGATIVE — SIGNIFICANT CHANGE UP
LACTATE BLDV-MCNC: 1.3 MMOL/L — SIGNIFICANT CHANGE UP (ref 0.5–2)
LACTATE BLDV-MCNC: 1.5 MMOL/L — SIGNIFICANT CHANGE UP (ref 0.5–2)
LACTATE BLDV-MCNC: 1.8 MMOL/L — SIGNIFICANT CHANGE UP (ref 0.5–2)
LACTATE BLDV-MCNC: 2.6 MMOL/L — HIGH (ref 0.5–2)
LEUKOCYTE ESTERASE UR-ACNC: ABNORMAL
LYMPHOCYTES # BLD AUTO: 1.63 K/UL — SIGNIFICANT CHANGE UP (ref 1–3.3)
LYMPHOCYTES # BLD AUTO: 8.3 % — LOW (ref 13–44)
MAGNESIUM SERPL-MCNC: 2.6 MG/DL — SIGNIFICANT CHANGE UP (ref 1.6–2.6)
MCHC RBC-ENTMCNC: 28 PG — SIGNIFICANT CHANGE UP (ref 27–34)
MCHC RBC-ENTMCNC: 29.1 GM/DL — LOW (ref 32–36)
MCV RBC AUTO: 96.3 FL — SIGNIFICANT CHANGE UP (ref 80–100)
MONOCYTES # BLD AUTO: 1.34 K/UL — HIGH (ref 0–0.9)
MONOCYTES NFR BLD AUTO: 6.8 % — SIGNIFICANT CHANGE UP (ref 2–14)
NEUTROPHILS # BLD AUTO: 16.61 K/UL — HIGH (ref 1.8–7.4)
NEUTROPHILS NFR BLD AUTO: 84 % — HIGH (ref 43–77)
NITRITE UR-MCNC: NEGATIVE — SIGNIFICANT CHANGE UP
NRBC # BLD: 0 /100 WBCS — SIGNIFICANT CHANGE UP (ref 0–0)
NRBC # FLD: 0 K/UL — SIGNIFICANT CHANGE UP (ref 0–0)
NT-PROBNP SERPL-SCNC: HIGH PG/ML
PCO2 BLDV: 56 MMHG — HIGH (ref 42–55)
PCO2 BLDV: 58 MMHG — HIGH (ref 42–55)
PCO2 BLDV: 69 MMHG — HIGH (ref 42–55)
PCO2 BLDV: 84 MMHG — HIGH (ref 42–55)
PH BLDV: 7.17 — LOW (ref 7.32–7.43)
PH BLDV: 7.22 — LOW (ref 7.32–7.43)
PH BLDV: 7.31 — LOW (ref 7.32–7.43)
PH BLDV: 7.31 — LOW (ref 7.32–7.43)
PH UR: 6 — SIGNIFICANT CHANGE UP (ref 5–8)
PHOSPHATE SERPL-MCNC: 6.9 MG/DL — HIGH (ref 2.5–4.5)
PLATELET # BLD AUTO: 311 K/UL — SIGNIFICANT CHANGE UP (ref 150–400)
PO2 BLDV: 44 MMHG — SIGNIFICANT CHANGE UP (ref 25–45)
PO2 BLDV: 50 MMHG — HIGH (ref 25–45)
PO2 BLDV: 66 MMHG — HIGH (ref 25–45)
PO2 BLDV: 71 MMHG — HIGH (ref 25–45)
POTASSIUM BLDV-SCNC: 5 MMOL/L — SIGNIFICANT CHANGE UP (ref 3.5–5.1)
POTASSIUM BLDV-SCNC: 5.8 MMOL/L — HIGH (ref 3.5–5.1)
POTASSIUM BLDV-SCNC: 6.6 MMOL/L — CRITICAL HIGH (ref 3.5–5.1)
POTASSIUM BLDV-SCNC: 7.3 MMOL/L — CRITICAL HIGH (ref 3.5–5.1)
POTASSIUM SERPL-MCNC: 4.9 MMOL/L — SIGNIFICANT CHANGE UP (ref 3.5–5.3)
POTASSIUM SERPL-MCNC: 5.8 MMOL/L — HIGH (ref 3.5–5.3)
POTASSIUM SERPL-MCNC: SIGNIFICANT CHANGE UP MMOL/L (ref 3.5–5.3)
POTASSIUM SERPL-MCNC: SIGNIFICANT CHANGE UP MMOL/L (ref 3.5–5.3)
POTASSIUM SERPL-SCNC: 4.9 MMOL/L — SIGNIFICANT CHANGE UP (ref 3.5–5.3)
POTASSIUM SERPL-SCNC: 5.8 MMOL/L — HIGH (ref 3.5–5.3)
POTASSIUM SERPL-SCNC: SIGNIFICANT CHANGE UP MMOL/L (ref 3.5–5.3)
POTASSIUM SERPL-SCNC: SIGNIFICANT CHANGE UP MMOL/L (ref 3.5–5.3)
PROT SERPL-MCNC: 10.7 G/DL — HIGH (ref 6–8.3)
PROT UR-MCNC: ABNORMAL
PROTHROM AB SERPL-ACNC: 14.8 SEC — HIGH (ref 10.5–13.4)
RBC # BLD: 3.75 M/UL — LOW (ref 4.2–5.8)
RBC # FLD: 14.3 % — SIGNIFICANT CHANGE UP (ref 10.3–14.5)
RBC CASTS # UR COMP ASSIST: 5 /HPF — HIGH (ref 0–4)
RSV RNA NPH QL NAA+NON-PROBE: SIGNIFICANT CHANGE UP
SAO2 % BLDV: 63.4 % — LOW (ref 67–88)
SAO2 % BLDV: 77.9 % — SIGNIFICANT CHANGE UP (ref 67–88)
SAO2 % BLDV: 93.3 % — HIGH (ref 67–88)
SAO2 % BLDV: 93.3 % — HIGH (ref 67–88)
SARS-COV-2 RNA SPEC QL NAA+PROBE: SIGNIFICANT CHANGE UP
SODIUM SERPL-SCNC: 130 MMOL/L — LOW (ref 135–145)
SODIUM SERPL-SCNC: 136 MMOL/L — SIGNIFICANT CHANGE UP (ref 135–145)
SODIUM SERPL-SCNC: 137 MMOL/L — SIGNIFICANT CHANGE UP (ref 135–145)
SODIUM SERPL-SCNC: 138 MMOL/L — SIGNIFICANT CHANGE UP (ref 135–145)
SP GR SPEC: 1.01 — SIGNIFICANT CHANGE UP (ref 1.01–1.05)
SPECIMEN SOURCE: SIGNIFICANT CHANGE UP
TROPONIN T, HIGH SENSITIVITY RESULT: 70 NG/L — CRITICAL HIGH
UROBILINOGEN FLD QL: SIGNIFICANT CHANGE UP
WBC # BLD: 19.74 K/UL — HIGH (ref 3.8–10.5)
WBC # FLD AUTO: 19.74 K/UL — HIGH (ref 3.8–10.5)
WBC UR QL: 46 /HPF — HIGH (ref 0–5)

## 2023-02-12 PROCEDURE — 99222 1ST HOSP IP/OBS MODERATE 55: CPT | Mod: GC

## 2023-02-12 PROCEDURE — 99223 1ST HOSP IP/OBS HIGH 75: CPT

## 2023-02-12 PROCEDURE — 71250 CT THORAX DX C-: CPT | Mod: 26,MA

## 2023-02-12 PROCEDURE — 99291 CRITICAL CARE FIRST HOUR: CPT

## 2023-02-12 RX ORDER — DEXTROSE 50 % IN WATER 50 %
15 SYRINGE (ML) INTRAVENOUS ONCE
Refills: 0 | Status: DISCONTINUED | OUTPATIENT
Start: 2023-02-12 | End: 2023-02-18

## 2023-02-12 RX ORDER — BUMETANIDE 0.25 MG/ML
1.5 INJECTION INTRAMUSCULAR; INTRAVENOUS ONCE
Refills: 0 | Status: COMPLETED | OUTPATIENT
Start: 2023-02-12 | End: 2023-02-12

## 2023-02-12 RX ORDER — BUMETANIDE 0.25 MG/ML
0.5 INJECTION INTRAMUSCULAR; INTRAVENOUS ONCE
Refills: 0 | Status: COMPLETED | OUTPATIENT
Start: 2023-02-12 | End: 2023-02-12

## 2023-02-12 RX ORDER — METRONIDAZOLE 500 MG
500 TABLET ORAL ONCE
Refills: 0 | Status: COMPLETED | OUTPATIENT
Start: 2023-02-12 | End: 2023-02-12

## 2023-02-12 RX ORDER — ATORVASTATIN CALCIUM 80 MG/1
40 TABLET, FILM COATED ORAL AT BEDTIME
Refills: 0 | Status: DISCONTINUED | OUTPATIENT
Start: 2023-02-12 | End: 2023-02-18

## 2023-02-12 RX ORDER — AZITHROMYCIN 500 MG/1
TABLET, FILM COATED ORAL
Refills: 0 | Status: DISCONTINUED | OUTPATIENT
Start: 2023-02-12 | End: 2023-02-12

## 2023-02-12 RX ORDER — INSULIN LISPRO 100/ML
VIAL (ML) SUBCUTANEOUS
Refills: 0 | Status: DISCONTINUED | OUTPATIENT
Start: 2023-02-12 | End: 2023-02-13

## 2023-02-12 RX ORDER — SODIUM ZIRCONIUM CYCLOSILICATE 10 G/10G
10 POWDER, FOR SUSPENSION ORAL ONCE
Refills: 0 | Status: COMPLETED | OUTPATIENT
Start: 2023-02-12 | End: 2023-02-12

## 2023-02-12 RX ORDER — BUMETANIDE 0.25 MG/ML
1 INJECTION INTRAMUSCULAR; INTRAVENOUS EVERY 12 HOURS
Refills: 0 | Status: DISCONTINUED | OUTPATIENT
Start: 2023-02-13 | End: 2023-02-13

## 2023-02-12 RX ORDER — DEXTROSE 50 % IN WATER 50 %
12.5 SYRINGE (ML) INTRAVENOUS ONCE
Refills: 0 | Status: DISCONTINUED | OUTPATIENT
Start: 2023-02-12 | End: 2023-02-18

## 2023-02-12 RX ORDER — PIPERACILLIN AND TAZOBACTAM 4; .5 G/20ML; G/20ML
3.38 INJECTION, POWDER, LYOPHILIZED, FOR SOLUTION INTRAVENOUS EVERY 12 HOURS
Refills: 0 | Status: DISCONTINUED | OUTPATIENT
Start: 2023-02-13 | End: 2023-02-13

## 2023-02-12 RX ORDER — SODIUM CHLORIDE 9 MG/ML
1000 INJECTION, SOLUTION INTRAVENOUS
Refills: 0 | Status: DISCONTINUED | OUTPATIENT
Start: 2023-02-12 | End: 2023-02-18

## 2023-02-12 RX ORDER — GLUCAGON INJECTION, SOLUTION 0.5 MG/.1ML
1 INJECTION, SOLUTION SUBCUTANEOUS ONCE
Refills: 0 | Status: DISCONTINUED | OUTPATIENT
Start: 2023-02-12 | End: 2023-02-18

## 2023-02-12 RX ORDER — INSULIN HUMAN 100 [IU]/ML
5 INJECTION, SOLUTION SUBCUTANEOUS ONCE
Refills: 0 | Status: COMPLETED | OUTPATIENT
Start: 2023-02-12 | End: 2023-02-12

## 2023-02-12 RX ORDER — APIXABAN 2.5 MG/1
2.5 TABLET, FILM COATED ORAL
Refills: 0 | Status: DISCONTINUED | OUTPATIENT
Start: 2023-02-12 | End: 2023-02-18

## 2023-02-12 RX ORDER — IPRATROPIUM/ALBUTEROL SULFATE 18-103MCG
3 AEROSOL WITH ADAPTER (GRAM) INHALATION EVERY 6 HOURS
Refills: 0 | Status: DISCONTINUED | OUTPATIENT
Start: 2023-02-12 | End: 2023-02-13

## 2023-02-12 RX ORDER — METRONIDAZOLE 500 MG
500 TABLET ORAL ONCE
Refills: 0 | Status: DISCONTINUED | OUTPATIENT
Start: 2023-02-12 | End: 2023-02-12

## 2023-02-12 RX ORDER — PIPERACILLIN AND TAZOBACTAM 4; .5 G/20ML; G/20ML
3.38 INJECTION, POWDER, LYOPHILIZED, FOR SOLUTION INTRAVENOUS ONCE
Refills: 0 | Status: COMPLETED | OUTPATIENT
Start: 2023-02-12 | End: 2023-02-12

## 2023-02-12 RX ORDER — CALCIUM GLUCONATE 100 MG/ML
2 VIAL (ML) INTRAVENOUS ONCE
Refills: 0 | Status: DISCONTINUED | OUTPATIENT
Start: 2023-02-12 | End: 2023-02-12

## 2023-02-12 RX ORDER — BUDESONIDE AND FORMOTEROL FUMARATE DIHYDRATE 160; 4.5 UG/1; UG/1
2 AEROSOL RESPIRATORY (INHALATION)
Refills: 0 | Status: DISCONTINUED | OUTPATIENT
Start: 2023-02-12 | End: 2023-02-13

## 2023-02-12 RX ORDER — AZITHROMYCIN 500 MG/1
500 TABLET, FILM COATED ORAL ONCE
Refills: 0 | Status: DISCONTINUED | OUTPATIENT
Start: 2023-02-12 | End: 2023-02-12

## 2023-02-12 RX ORDER — INSULIN LISPRO 100/ML
VIAL (ML) SUBCUTANEOUS AT BEDTIME
Refills: 0 | Status: DISCONTINUED | OUTPATIENT
Start: 2023-02-12 | End: 2023-02-18

## 2023-02-12 RX ORDER — DEXTROSE 50 % IN WATER 50 %
25 SYRINGE (ML) INTRAVENOUS ONCE
Refills: 0 | Status: DISCONTINUED | OUTPATIENT
Start: 2023-02-12 | End: 2023-02-18

## 2023-02-12 RX ORDER — CIPROFLOXACIN LACTATE 400MG/40ML
500 VIAL (ML) INTRAVENOUS ONCE
Refills: 0 | Status: DISCONTINUED | OUTPATIENT
Start: 2023-02-12 | End: 2023-02-12

## 2023-02-12 RX ORDER — CEFEPIME 1 G/1
1000 INJECTION, POWDER, FOR SOLUTION INTRAMUSCULAR; INTRAVENOUS ONCE
Refills: 0 | Status: COMPLETED | OUTPATIENT
Start: 2023-02-12 | End: 2023-02-12

## 2023-02-12 RX ORDER — VANCOMYCIN HCL 1 G
1000 VIAL (EA) INTRAVENOUS ONCE
Refills: 0 | Status: COMPLETED | OUTPATIENT
Start: 2023-02-12 | End: 2023-02-12

## 2023-02-12 RX ORDER — DEXTROSE 50 % IN WATER 50 %
50 SYRINGE (ML) INTRAVENOUS ONCE
Refills: 0 | Status: COMPLETED | OUTPATIENT
Start: 2023-02-12 | End: 2023-02-12

## 2023-02-12 RX ADMIN — PIPERACILLIN AND TAZOBACTAM 200 GRAM(S): 4; .5 INJECTION, POWDER, LYOPHILIZED, FOR SOLUTION INTRAVENOUS at 22:14

## 2023-02-12 RX ADMIN — SODIUM ZIRCONIUM CYCLOSILICATE 10 GRAM(S): 10 POWDER, FOR SUSPENSION ORAL at 17:49

## 2023-02-12 RX ADMIN — INSULIN HUMAN 5 UNIT(S): 100 INJECTION, SOLUTION SUBCUTANEOUS at 16:47

## 2023-02-12 RX ADMIN — BUMETANIDE 0.5 MILLIGRAM(S): 0.25 INJECTION INTRAMUSCULAR; INTRAVENOUS at 11:09

## 2023-02-12 RX ADMIN — Medication 250 MILLIGRAM(S): at 22:52

## 2023-02-12 RX ADMIN — Medication 50 MILLILITER(S): at 16:47

## 2023-02-12 RX ADMIN — Medication 3 MILLILITER(S): at 21:58

## 2023-02-12 RX ADMIN — BUMETANIDE 1.5 MILLIGRAM(S): 0.25 INJECTION INTRAMUSCULAR; INTRAVENOUS at 14:25

## 2023-02-12 RX ADMIN — CEFEPIME 100 MILLIGRAM(S): 1 INJECTION, POWDER, FOR SOLUTION INTRAMUSCULAR; INTRAVENOUS at 14:46

## 2023-02-12 RX ADMIN — Medication 100 MILLIGRAM(S): at 15:06

## 2023-02-12 NOTE — ED ADULT NURSE NOTE - OBJECTIVE STATEMENT
pt to rm tr b- notification. [place] speaking. c/o weakness. denies pain.  alert,oriented x3. noted tachypneic,c pap on ambulance. rt to bedside bipap placed 10/5 with 40 Percent o2 at present, iv access r wrist,labs sent. arrives ems l piv. ekg completed.  c monitoring in place a fib. color pale,skin diaphoretic,cool. float rn . will continue to monitor

## 2023-02-12 NOTE — H&P ADULT - NSHPREVIEWOFSYSTEMS_GEN_ALL_CORE
General: Denies dizziness, fatigue  Respiratory" + productive cough; + SOB   Cardiovascular: Denies palpitations, CP  Gastrointestinal: Denies abd pain, N/V/D/C, hematochezia, melena  : Denies dysuria,   Musculoskeletal: Denies edema,  Allergic/Immunologic: Denies rashes   Neuro: Denies weakness,   All ROS negative unless indicated above

## 2023-02-12 NOTE — H&P ADULT - PROBLEM SELECTOR PLAN 8
DVT ppx: Heparin sq   Diet: NPO while on BIPAP, will need Dysphagia screen and S/S eval   Dispo: pending medical course     Fall precautions   Aspirations precautions Takes Humalog 25U BID at home   - Will keep NPO iso of aspiration and BIPAP  - Place on ISS qith q6 FS   - S/S eval   - Nutrition c/s

## 2023-02-12 NOTE — ED ADULT NURSE NOTE - NSIMPLEMENTINTERV_GEN_ALL_ED
Implemented All Fall with Harm Risk Interventions:  [place] to call system. Call bell, personal items and telephone within reach. Instruct patient to call for assistance. Room bathroom lighting operational. Non-slip footwear when patient is off stretcher. Physically safe environment: no spills, clutter or unnecessary equipment. Stretcher in lowest position, wheels locked, appropriate side rails in place. Provide visual cue, wrist band, yellow gown, etc. Monitor gait and stability. Monitor for mental status changes and reorient to person, place, and time. Review medications for side effects contributing to fall risk. Reinforce activity limits and safety measures with patient and family. Provide visual clues: red socks.

## 2023-02-12 NOTE — CONSULT NOTE ADULT - SUBJECTIVE AND OBJECTIVE BOX
83M PMH CHF w/ 20% EF, bronchiectasis (?Asthma?), on 2L home O2 as needed, aspiration PNA, presented w/ lethargy and hypoxia. MICU consulted for new BiPAP.    Per son at bedside, pt is usually independent and lives w/ wife. However, pt recently became hypoxic to 80s despite on 5L and had HR elevated to 145. Son is unsure if pt fully compliant w/ meds, as pt takes his own meds and is not always taking all of them. Denied nausea, vomiting, diarrhea. Able to make urine at home.    In the ED he was placed on BiPAP (10/5) iso CHF exacerbation, s/p 2mg bumex, CT chest showed b/l pleural effusion, bronchiectasis. VBG showed respiratory acidosis that significantly improved after being placed on BiPAP.    ROS:  CONSTITUTIONAL: No weakness, fevers or chills  EYES/ENT: No visual changes;  No vertigo or throat pain   NECK: No pain or stiffness  RESPIRATORY: SOB  CARDIOVASCULAR: No chest pain or palpitations  GASTROINTESTINAL: No abdominal or epigastric pain. No nausea, vomiting, or hematemesis; No diarrhea or constipation. No melena or hematochezia.  GENITOURINARY: No dysuria, frequency or hematuria  NEUROLOGICAL: No numbness or weakness  SKIN: No itching, burning, rashes, or lesions   PSYCH: no hx depression, no hx anxiety    Physical exam:  VITALS:   T(C): 37.1 (23 @ 12:30), Max: 37.1 (23 @ 10:54)  HR: 100 (23 @ 15:09) (91 - 130)  BP: 103/68 (23 @ 12:30) (92/64 - 115/73)  RR: 22 (23 @ 12:30) (22 - 36)  SpO2: 98% (23 @ 15:09) (93% - 100%)    GENERAL: NAD, lying in bed comfortably  HEAD:  Atraumatic, normocephalic  EYES: EOMI, PERRLA, conjunctiva and sclera clear  ENT: Moist mucous membranes  NECK: Supple, no JVD  HEART: Regular rate and rhythm, no murmurs, rubs, or gallops  LUNGS: on BiPAP, RLL crackles, LLL decreased breath sounds  ABDOMEN: Soft, nontender, nondistended, +BS  EXTREMITIES: 2+ peripheral pulses bilaterally. No clubbing, cyanosis, or edema  NERVOUS SYSTEM:  A&Ox3, no focal deficits   SKIN: No rashes or lesions    Labs:      138  |  98  |  77<H>  ----------------------------<  245<H>  5.8<H>   |  22  |  2.78<H>      130<L>  |  99  |  68<H>  ----------------------------<  240<H>  TNP   |  19<L>  |  2.22<H>      136  |  97<L>  |  71<H>  ----------------------------<  258<H>  TNP   |  24  |  2.50<H>    Ca    9.3      2023 14:20  Ca    8.1<L>      2023 11:44  Ca    9.4      2023 10:22    TPro  10.7<H>  /  Alb  3.4  /  TBili  0.8  /  DBili  x   /  AST  41<H>  /  ALT  14  /  AlkPhos  94        PT/INR - ( 2023 10:22 )   PT: 14.8 sec;   INR: 1.27 ratio         PTT - ( 2023 10:22 )  PTT:40.5 sec              Urinalysis Basic - ( 2023 11:15 )    Color: Light Yellow / Appearance: Clear / S.011 / pH: x  Gluc: x / Ketone: Negative  / Bili: Negative / Urobili: <2 mg/dL   Blood: x / Protein: 30 mg/dL / Nitrite: Negative   Leuk Esterase: Large / RBC: 5 /HPF / WBC 46 /HPF   Sq Epi: x / Non Sq Epi: 2 /HPF / Bacteria: Negative                              10.5   19.74 )-----------( 311      ( 2023 10:22 )             36.1     CAPILLARY BLOOD GLUCOSE      POCT Blood Glucose.: 230 mg/dL (2023 16:27)  POCT Blood Glucose.: 241 mg/dL (2023 10:42)

## 2023-02-12 NOTE — H&P ADULT - NSHPPHYSICALEXAM_GEN_ALL_CORE
Vital Signs Last 24 Hrs  T(C): 37.1 (12 Feb 2023 16:51), Max: 37.1 (12 Feb 2023 10:54)  T(F): 98.8 (12 Feb 2023 16:51), Max: 98.8 (12 Feb 2023 10:54)  HR: 93 (12 Feb 2023 16:51) (91 - 130)  BP: 101/57 (12 Feb 2023 16:51) (92/64 - 115/73)  BP(mean): 72 (12 Feb 2023 16:51) (72 - 72)  RR: 20 (12 Feb 2023 16:51) (20 - 36)  SpO2: 100% (12 Feb 2023 16:51) (93% - 100%)    Parameters below as of 12 Feb 2023 16:51  Patient On (Oxygen Delivery Method): BiPAP/CPAP    O2 Concentration (%): 40    CONSTITUTIONAL: NAD; On BIPAP  HEENT: clear conjunctiva  RESPIRATORY: Normal respiratory effort; + Crackles B/L   CARDIOVASCULAR: Irregular rate and rhythm, normal S1 and S2, no murmur/rub/gallop; No lower extremity edema; Peripheral pulses are 2+ bilaterally  ABDOMEN: Nontender to Palpation; normoactive bowel sounds, no rebound/guarding; No hepatosplenomegaly  MUSCULOSKELETAL: no clubbing or cyanosis of digits; no joint swelling or tenderness to palpation  EXTREMITY: Lower extremities Non-tender to palpation; non-erythematous B/L  NEURO: A&Ox3; no focal deficits   PSYCH: normal mood; Affect appropirate

## 2023-02-12 NOTE — ED PROVIDER NOTE - CLINICAL SUMMARY MEDICAL DECISION MAKING FREE TEXT BOX
83 year old male with history of DM, Afib on Eliquis and digoxin/metoprolol, presenting with SOB. Requiring PPV here for significant WOB, +signs of volume overload on exam with known poor EF ~20% per son, will need IV diuresis and close monitoring, tba

## 2023-02-12 NOTE — CONSULT NOTE ADULT - ASSESSMENT
83M PMH CHF w/ 20% EF, bronchiectasis (?Asthma?), on 2L home O2 as needed, aspiration PNA, presented w/ lethargy and hypoxia. MICU consulted for new BiPAP.    # new BiPAP use likely iso CHF exacerbation  - pt requires BiPAP because of respiratory acidosis and b/l pulm congestion on physical exam  - suspect that the CHF exacerbation is likely 2/2 medication noncompliance  - recommend cardiology consult for CHF exacerbation  - pulmonary will follow up on BiPAP  - not a MICU candidate at this point. Reconsult PRN    Case d/w Dr. Otoole

## 2023-02-12 NOTE — H&P ADULT - ATTENDING COMMENTS
83M with a PMH of Ischemic Cardiomyopathy (LVEF in 2019 25%) refused Defibrillator in the past, CAD (multiple stents 5-6 last stent over 10 years ago at [facility]), T2DM, afib, CKD3, HTN, HLD , afib presenting with worsening shortness of breath. The son reports that starting today, the patient felt that he was having SOB and also more tachypneic. The son also reported new onset wheezing while he was having the episode. He was also hypoxic at home as per the monitor. The patient occasionally uses oxygen at home as needed. The patient also had tachycardia with a HR of 145. He was instructed to give metoprolol half pill and to call EMS to bring the patient to the hospital. The patient also has questionable compliance with his medications, would sometimes forget. As person, the patient was admitted at [facility] 8 months ago and since he was discharged, he has been having aspiration while eating, he also has a chronic cough but has worsened in the last week with phelgm production. The son is concerned that he might be aspirating on his food. The son also endorsed that the patient has occasional chest pain, requesting a cardiologist to see him during this admission. Last stent placed about 10 years ago at [facility], and reported that he had a instent thrombosis but was repaired at that time, and since then had no issues. Unable to describe the chest pain further.    PCP well known to me (was a private attending at [facility]). Son states that he did not [facility] so he brought the patient [facility] for [facility] care.    Physical exam shows a elderly male, NAD, comfortable at bedside, tolerating bipap well, lungs is + crackles b/l, no wheezing noted, cardiac irregular rate and rhythm, s1s2 present, no murmurs, abdomen soft nontender to palpation b/l, no LE edema. HR normal on tele monitoring.    Labs show leukocytosis with 19, BMP shows CKD with Cr 2.65, hyperkalemia resolved with K 4.9, Procal 1.4, pCO2 58 (previously 84), pH 7.31). CT chest shows b/l patchy opacities and emphysema    The patient is admitted for sepsis and acute hypoxic respiratory failure secondary to acute CHF exacerbation, COPD exacerbation and aspiration pneumonia. Will start zosyn and vanco for broad abx coverage. Obtain blood cultures and sputum cultures. Would recommend ID consult. Will c/w bipap for hypoxic respiratory failure. Pulmonary consult. Will start solumedrol and duonebs for COPD exacerbation. For the CHF exacerbation, will start aggressive diuresis with bumex. Will obtain echo to evaluate for EF. Strict I/O and fluid restrict. Will place the patient NPO for now pending dysphagia eval. The patient unlikely has ACS at this time however will repeat troponins, likely has type 2 demand ischemia from sepsis. Would still recommend obtaining cardiology evaluation for possible work up of worsening CAD in the setting of his comorbidities. cardiology and MICU recommendations appreciated. c/w apixaban for afib. tele monitoring. 83M with a PMH of Ischemic Cardiomyopathy (LVEF in 2019 25%) refused Defibrillator in the past, CAD (multiple stents 5-6 last stent over 10 years ago at [facility]), T2DM, afib, CKD3, HTN, HLD , afib presenting with worsening shortness of breath. The son reports that starting today, the patient felt that he was having SOB and also more tachypneic. The son also reported new onset wheezing while he was having the episode. He was also hypoxic at home as per the monitor. The patient occasionally uses oxygen at home as needed. The patient also had tachycardia with a HR of 145. He was instructed to give metoprolol half pill and to call EMS to bring the patient to the hospital. The patient also has questionable compliance with his medications, would sometimes forget. As person, the patient was admitted at [facility] 8 months ago and since he was discharged, he has been having aspiration while eating, he also has a chronic cough but has worsened in the last week with phelgm production. The son is concerned that he might be aspirating on his food. The son also endorsed that the patient has occasional chest pain, requesting a cardiologist to see him during this admission. Last stent placed about 10 years ago at [facility], and reported that he had an instent thrombosis but was repaired at that time, and since then had no issues. Unable to describe the chest pain further.    PCP well known to me (was a private attending at [facility]). Son states that he did not like [facility] so he brought the patient to [facility] for further care.    Physical exam shows a elderly male, NAD, comfortable at bedside, tolerating bipap well, lungs is + crackles b/l, no wheezing noted, cardiac irregular rate and rhythm, s1s2 present, no murmurs, abdomen soft nontender to palpation b/l, no LE edema. HR normal on tele monitoring.    Labs show leukocytosis with 19, BMP shows CKD with Cr 2.65, hyperkalemia resolved with K 4.9, Procal 1.4, pCO2 58 (previously 84), pH 7.31). CT chest shows b/l patchy opacities and emphysema    The patient is admitted for sepsis and acute hypoxic respiratory failure secondary to acute CHF exacerbation, COPD exacerbation and aspiration pneumonia. Will start zosyn and vanco for broad abx coverage. Obtain blood cultures and sputum cultures. Would recommend ID consult. Will c/w bipap for hypoxic respiratory failure. Pulmonary consult. Will start solumedrol and duonebs for COPD exacerbation. For the CHF exacerbation, will start aggressive diuresis with bumex. Will obtain echo to evaluate for EF. Strict I/O and fluid restrict. Will place the patient NPO for now pending dysphagia eval. The patient unlikely has ACS at this time however will repeat troponins, likely has type 2 demand ischemia from sepsis. Would still recommend obtaining cardiology evaluation for possible work up of worsening CAD in the setting of his comorbidities. cardiology and MICU recommendations appreciated. c/w apixaban for afib. tele monitoring.

## 2023-02-12 NOTE — H&P ADULT - PROBLEM SELECTOR PLAN 10
DVT ppx: Heparin sq   Diet: NPO while on BIPAP, will need Dysphagia screen and S/S eval   Dispo: pending medical course     Fall precautions   Aspirations precautions

## 2023-02-12 NOTE — H&P ADULT - PROBLEM SELECTOR PLAN 3
Having aspirations at home and was hospitalized 8 months ago for ASP pna   CT chest with B/L Opacities c/f PNA   WBC 19k  S/P Cefepime in ed   - F/U Sputum cx   - C/w cefepime   - Star Vanc by lvl   - MRSA swab   - S/S eval  - nutrition c/s   - Aspiration precautions Having aspirations at home and was hospitalized 8 months ago for ASP pna   CT chest with B/L Opacities c/f PNA   WBC 19k  S/P Cefepime in ed   - F/U Sputum cx   - C/w cefepime   - Star Vanc by lvl   - Start Azithromycin , Check urine legionella   - MRSA swab   - S/S eval  - nutrition c/s   - Aspiration precautions Having aspirations at home and was hospitalized 8 months ago for ASP pna   CT chest with B/L Opacities c/f PNA   WBC 19k  S/P Cefepime in ed   - F/U Sputum cx   - C/w Zosyn   - Star Vanc by lvl   - MRSA swab   - S/S eval  - nutrition c/s   - Aspiration precautions

## 2023-02-12 NOTE — H&P ADULT - ASSESSMENT
83M with a PMH of Ischemic Cardiomyopathy (LVEF in 2019 25%) refused Defibrillator in the past, CAD (multiple stents 5-6 last stent over 10 years ago at [facility]), T2DM, afib, CKD3, HTN, HLD , afib presenting with AHRF iso CHF exacerbation with sepsis likely 2/2 Aspiration PNA and sebastián.

## 2023-02-12 NOTE — H&P ADULT - PROBLEM SELECTOR PLAN 9
DVT ppx: Heparin sq   Diet: NPO while on BIPAP, will need Dysphagia screen and S/S eval   Dispo: pending medical course     Fall precautions   Aspirations precautions Takes Loppresor 12.5 BID   - C/w Lopressor 12.5 BID

## 2023-02-12 NOTE — ED PROVIDER NOTE - OBJECTIVE STATEMENT
83 year old male with history of DM, Afib on Eliquis and digoxin/metoprolol, presenting with SOB. Per home attendant and EMS collateral, patient had sudden onset of difficulty breathing this AM, found to be hypoxic to 70% on home pulse ox, placed on CPAP en route. No chest pain, fevers, cough. 83 year old male with history of HTN, DM, Afib on Eliquis and digoxin/metoprolol, CHF, presenting with SOB. Per home attendant and EMS collateral, patient had sudden onset of difficulty breathing this AM, found to be hypoxic to 70% on home pulse ox, placed on CPAP en route. No chest pain, fevers, cough.

## 2023-02-12 NOTE — ED PROVIDER NOTE - ATTENDING CONTRIBUTION TO CARE
83-year-old male past medical history hypertension, diabetes, A-fib, CHF, and asthma with worsening short of breath over the past 3 days found to be hypoxic and tachypneic this morning.  On arrival with EMS begin CPAP.  Patient and son deny recent fevers chills cough abdominal pain, nausea, vomiting, dysuria or hematuria.  Per son was given antibiotics several weeks ago for a lung infection improved afterwards.  No known sick contacts.  No COVID vaccination history.  Exam as above extremities not swollen.  No laceration abrasion.  No evidence of cellulitis.  POCUS showing severely decreased creased EF, bilateral B-lines, bilateral small pleural effusions.  Additional history obtained from son Krishan at bedside reports patient does have a history of nonadherence to his medications in the past.  Was increased on his metoprolol dosing due to shortness of breath by PCP.  Differential diagnosis includes, but not limited to, acute CHF exacerbation, A-fib with RVR, rule out infectious etiology.  Plan: BiPAP, x-ray, and labs, diuresis, reassess.  Patient with borderline low BPs will diurese carefully.  Per son patient's blood pressure always low.

## 2023-02-12 NOTE — H&P ADULT - PROBLEM SELECTOR PLAN 4
Takes Eliquis, digoxin, metoprolol at home   Came in afib  rates   Likely iso of sepsis 2/2 PNA and CHF exacerbation   - Takes Eliquis, digoxin, metoprolol at home   Came in afib  rates   Likely iso of sepsis 2/2 PNA and CHF exacerbation   - Rates currently controlled   - C/w home meds Takes albuterol, Monteleukast at home   - C/w duonebs   - C/w Symbicort - patient has troponin of 70, but denies chest pain at this time  - likely demand ischemia secondary to afib RVR, CHF exacerbation and copd exacerbation  - will repeat troponins  - cardiology consult  - monitor for chest pain  - echo pending

## 2023-02-12 NOTE — H&P ADULT - PROBLEM SELECTOR PLAN 1
AHRF with hypoxia to 70s via EMS placed in BIPAP   S/P cefepime and bumex in ED   - Respiratory status improved on BIPAP   - Acidosis improved   - C/w Bipap 10.5 wean as tolerable   - C/w treating underlying cause both PNA and CHF exacerbation as above   - Needs S/S and dysphagia eval before diet can be restarted AHRF with hypoxia to 70s via EMS placed in BIPAP   S/P cefepime and bumex in ED   - Respiratory status improved on BIPAP   - Acidosis improved   - C/w Bipap 10/5 wean as tolerable   - C/w treating underlying cause both PNA and CHF exacerbation as above   - Needs S/S and dysphagia eval before diet can be restarted

## 2023-02-12 NOTE — H&P ADULT - PROBLEM SELECTOR PLAN 6
Takes Loppresor 12.5 BID   - C/w Lopressor 12.5 BID Baseline Scr unknown no previous records   SCr: 2.78 with slight hyperkalemia 5.8 on admission   Drew placed in ED   Likely iso of CHF exacerbation and Sepsis   - Urine lytes   - Strict I&O   - Avoid nephrotoxin agents   - Renally dose meds EKG shows afib RVR  - c/w eliquis

## 2023-02-12 NOTE — H&P ADULT - NSHPLABSRESULTS_GEN_ALL_CORE
LABS:                        10.5   19.74 )-----------( 311      ( 2023 10:22 )             36.1     -    138  |  98  |  77<H>  ----------------------------<  245<H>  5.8<H>   |  22  |  2.78<H>    Ca    9.3      2023 14:20    TPro  10.7<H>  /  Alb  3.4  /  TBili  0.8  /  DBili  x   /  AST  41<H>  /  ALT  14  /  AlkPhos  94  02-12    PT/INR - ( 2023 10:22 )   PT: 14.8 sec;   INR: 1.27 ratio         PTT - ( 2023 10:22 )  PTT:40.5 sec      Urinalysis Basic - ( 2023 11:15 )    Color: Light Yellow / Appearance: Clear / S.011 / pH: x  Gluc: x / Ketone: Negative  / Bili: Negative / Urobili: <2 mg/dL   Blood: x / Protein: 30 mg/dL / Nitrite: Negative   Leuk Esterase: Large / RBC: 5 /HPF / WBC 46 /HPF   Sq Epi: x / Non Sq Epi: 2 /HPF / Bacteria: Negative          RADIOLOGY & ADDITIONAL TESTS:  Results Reviewed:   Imaging Personally Reviewed:  Electrocardiogram Personally Reviewed: LABS:                        10.5   19.74 )-----------( 311      ( 2023 10:22 )             36.1     -    138  |  98  |  77<H>  ----------------------------<  245<H>  5.8<H>   |  22  |  2.78<H>    Ca    9.3      2023 14:20    TPro  10.7<H>  /  Alb  3.4  /  TBili  0.8  /  DBili  x   /  AST  41<H>  /  ALT  14  /  AlkPhos  94  -12    PT/INR - ( 2023 10:22 )   PT: 14.8 sec;   INR: 1.27 ratio         PTT - ( 2023 10:22 )  PTT:40.5 sec      Urinalysis Basic - ( 2023 11:15 )    Color: Light Yellow / Appearance: Clear / S.011 / pH: x  Gluc: x / Ketone: Negative  / Bili: Negative / Urobili: <2 mg/dL   Blood: x / Protein: 30 mg/dL / Nitrite: Negative   Leuk Esterase: Large / RBC: 5 /HPF / WBC 46 /HPF   Sq Epi: x / Non Sq Epi: 2 /HPF / Bacteria: Negative          RADIOLOGY & ADDITIONAL TESTS:  Results Reviewed:   Imaging Personally Reviewed: CT chest Emphysema with patchy bilateral lung opacities that may represent a combination of fluid overload and infectious/inflammatory process.  Electrocardiogram Personally Reviewed: afib RVR

## 2023-02-12 NOTE — H&P ADULT - PROBLEM SELECTOR PLAN 5
Baseline Scr unknown no previous records   SCr: 2.78 with slight hyperkalemia 5.8 on admission   Drew placed in ED   Likely iso of CHF exacerbation and Sepsis   - Urine lytes   - Strict I&O   - Avoid nephrotoxin agents   - Renally dose meds Takes Eliquis, digoxin, metoprolol at home   Came in afib  rates   Likely iso of sepsis 2/2 PNA and CHF exacerbation   - Rates currently controlled   - C/w home meds - hx of COPD  - noted on HPI that he was wheezing at home when he was feeling SOB  - likely to have copd eacerbation  - physical exam shows improved wheezing  - hypercarbia at admission likely contributory to COPD exacerbation    Plan:  - will start solumedrol 40mg Q12hr and wean down to prednisone  - duoneb Q6hr, albuterol Q3hr  - monitor respiratory status closely  - c/w BIPAP  - repeat VBG in the AM  - pulmonary consult

## 2023-02-12 NOTE — ED ADULT NURSE REASSESSMENT NOTE - NS ED NURSE REASSESS COMMENT FT1
Pt taken CT in NAD with ID band in place. Pt on Zoll. RT and RN at bedside.
butcher  14 coude inserted to sbd clear straw colored urine,samples sent. as ordered
pt remains alert, oriented x3 upon awaking ,sleeping intermittently. son at bedside. pt turned, positioned ,redness- stage 1 to sacral, buttock area .cleaned ,positioned.. bipap remains in place.  as ordered.
Pt remains in ED, A&Ox4, respirations even/unlabored on BiPAP @ 40% 10/5. Pt tolerating BiPAP well. Due medications given as per MD order. Pt denies having any c/o pain or discomfort. All safety precautions maintained.
Pt received from TARIQ Chong, YEVGENIY&Ox4 on continuous cardiac monitoring. Respirations even/unlabored on BiPAP 40% 10/5 and tolerating well. Drew patent and in place to bedside drainage. Pt seen by CCU provider. Pt denies having any c/o pain or discomfort. Family at bedside and aware of plan of care. All safety precautions maintained.
Pt remains in ED, awaiting inpatient bed. Pt on continuous cardiopulmonary monitoring. Pt continues on BiPAP 40% 10/5 & tolerated well. Pt noted with good respiratory effort. Denies having any c/o pain or discomfort. All safety precautions maintained.

## 2023-02-12 NOTE — ED PROVIDER NOTE - PROGRESS NOTE DETAILS
Niraj PGY-3: Spoke with patient using Russian pacific , confirmed patient does NOT want intubation, will fill out MOLST for DNR/DNI. Stephen Shaw DO: CT resulting showing emphysema with patchy opacities that may be fluid overload versus infectious.  Given this finding we will treat with antibiotics, will avoid Zosyn given high sodium content.  Patient not a CCU candidate at this time.  Patient being evaluated by MICU given new BiPAP.

## 2023-02-12 NOTE — H&P ADULT - HISTORY OF PRESENT ILLNESS
started aspiration 8M ago , for PNA at [facility]   [facility] when drinks water he aspirates   takes his meds    *Used  Alexandra #[id number]  83M with a PMH of Ischemic Cardiomyopathy (LVEF in 2019 25%) refused Defibrillator in the past, CAD (multiple stents 5-6 last stent over 10 years ago at [facility]), T2DM, afib, CKD3, HTN, HLD , afib presenting with worsening shortness of breath. Patient was hospitalized 8 Months ago for aspiration PNA at [facility]. Since being discharged he has been struggling with aspiration at home , has trouble swallowing even puree foods and water / liquids. After he eats he will cough. Over the past few weeks he has had a few episodes of aspirating at home. He began having worsening SOB past few days. He gets sob even with walking a few steps. He does not follow w a cardiologist. He gets his meds from his PCP, he claims he is compliant with his medications. He also endorses white/clear sputum production at home. Denies recent fevers, chills, cp, abd pain, n/v, leg pain, melena, hematochezia, hematemesis.     ED: 101/57, HR 93, Afebrile, BIPAP settings 10/5, FIO2 40% pulling good TV

## 2023-02-12 NOTE — H&P ADULT - PROBLEM SELECTOR PLAN 7
Takes Humalog 25U BID at home   - Will keep NPO iso of aspiration and BIPAP  - Place on ISS qith q6 FS   - S/S eval   - Nutrition c/s Takes Loppresor 12.5 BID   - C/w Lopressor 12.5 BID Baseline Scr unknown no previous records   SCr: 2.78 with slight hyperkalemia 5.8 on admission   Drew placed in ED   Likely iso of CHF exacerbation and Sepsis   - Urine lytes   - Strict I&O   - Avoid nephrotoxin agents   - Renally dose meds

## 2023-02-12 NOTE — CONSULT NOTE ADULT - ASSESSMENT
Mr. Oneal is a 83M with a PMH of Ischemic Cardiomyopathy (LVEF in 2019 25%) refused Defibrillator in the past, CAD, T2DM, Bronchiectasis?, CKD3, HTN, HLD presenting with      Note not final until signed by attending       Martín Burrell MD  Cardiology Fellow PGY-5  Phone: [phone]    For all New Consults  [email or web address]   Login: [employer] Mr. Oneal is a 83M with a PMH of Ischemic Cardiomyopathy (LVEF in 2019 25%) refused Defibrillator in the past, CAD (multiple stents 5-6 last stent over 10 years ago at [facility]), T2DM, Bronchiectasis?, CKD3, HTN, HLD presenting with worsening shortness of breath. CCU consulted for heart failure.    1. Hypoxic/Hypercarbic Respiratory Failure - likely in the setting of acute heart failure exacerbation also concerned for underlying aspiration? 8 months ago admitted to [facility] for aspiration PNA and has been coughing on water  2. Acute on Chronic Heart Failure - TTE 2019 LVEF 25% refusing defibrillator on Bumex 1mg PO Q12H  3. ZACHARY on CKD    RECOMMENDATIONS:  - Agree with BIPAP for now would repeat a gas given hypercarbia to assess improvement  - Would give Bumex 2mg IVP x 1 given ZACHARY and can continue IV Bumex 1mg Q12H  - Fluid restrict daily weights strict I/Os goal net negative 1-2L  - Hold off on ACS treatment elevated troponins 2/2 to ZACHARY and HF no ischemic changes on EKG (Afib with known RBBB)  - Trend lactate  - Would check formal echocardiogram  - Would obtain CT chest to rule out underlying infection given leukocytosis   - Agree with pan-culture   - Would check bladder scan given ZACHARY and has known prostate issues   - Keep NPO on BIPAP mask once taken off would check for dysphagia      Patient does not require CICU level of care at this time        Note not final until signed by attending       Martín Burrell MD  Cardiology Fellow PGY-5  Phone: [phone]    For all New Consults  [email or web address]   Login: [email or web address] Mr. Oneal is a 83M with a PMH of Ischemic Cardiomyopathy (LVEF in 2019 25%) refused Defibrillator in the past, CAD (multiple stents 5-6 last stent over 10 years ago at [facility]), T2DM, Bronchiectasis?, CKD3, HTN, HLD presenting with worsening shortness of breath. CCU consulted for heart failure.    1. Hypoxic/Hypercarbic Respiratory Failure - likely in the setting of acute heart failure exacerbation also concerned for underlying aspiration? 8 months ago admitted to [facility] for aspiration PNA and has been coughing on water  2. Acute on Chronic Heart Failure - TTE 2019 LVEF 25% refusing defibrillator on Bumex 1mg PO Q12H  3. ZACHARY on CKD    RECOMMENDATIONS:  - Agree with BIPAP for now would repeat a gas given hypercarbia to assess improvement  - Would give Bumex 2mg IVP x 1 given ZACHARY and can continue IV Bumex 1mg Q12H  - Fluid restrict daily weights strict I/Os goal net negative 1-2L  - Hold off on ACS treatment elevated troponins 2/2 to ZACHARY and HF no ischemic changes on EKG (Afib with known RBBB)  - Trend lactate  - Would check formal echocardiogram  - Would obtain CT chest to rule out underlying infection given leukocytosis   - Agree with pan-culture   - Would check bladder scan given ZACHARY and has known prostate issues   - Keep NPO on BIPAP mask once taken off would check for dysphagia      Patient does not require CICU level of care at this time. Patient follows with Dr. Flavio Tucker as an outpatient would call to find out if he sees his patient or otherwise can call house Cardiology.        Note not final until signed by attending       Martín Burrell MD  Cardiology Fellow PGY-5  Phone: [phone]    For all New Consults  [email or web address]   Login: [employer]

## 2023-02-12 NOTE — CONSULT NOTE ADULT - SUBJECTIVE AND OBJECTIVE BOX
HPI:  Mr. Oneal is a 83M with a PMH of Ischemic Cardiomyopathy (LVEF in 2019 25%) refused Defibrillator in the past, CAD, T2DM, Bronchiectasis?, CKD3, HTN, HLD presenting with    PMH:   DM (diabetes mellitus)    Afib      PSH:     Medications:     Allergies:  No Known Allergies    FAMILY HISTORY:    Social History:  Smoking:  Alcohol:  Drugs:    Review of Systems:  Constitutional: [ ] Fever [ ] Chills [ ] Fatigue [ ] Weight change   HEENT: [ ] Blurred vision [ ] Eye Pain [ ] Headache [ ] Runny nose [ ] Sore Throat   Respiratory: [ ] Cough [ ] Wheezing [ ] Shortness of breath  Cardiovascular: [ ] Chest Pain [ ] Palpitations [ ] PARIKH [ ] PND [ ] Orthopnea  Gastrointestinal: [ ] Abdominal Pain [ ] Diarrhea [ ] Constipation [ ] Hemorrhoids [ ] Nausea [ ] Vomiting  Genitourinary: [ ] Nocturia [ ] Dysuria [ ] Incontinence  Extremities: [ ] Swelling [ ] Joint Pain  Neurologic: [ ] Focal deficit [ ] Paresthesias [ ] Syncope  Lymphatic: [ ] Swelling [ ] Lymphadenopathy   Skin: [ ] Rash [ ] Ecchymoses [ ] Wounds [ ] Lesions  Psychiatry: [ ] Depression [ ] Suicidal/Homicidal Ideation [ ] Anxiety [ ] Sleep Disturbances  [ ] 10 point review of systems is otherwise negative except as mentioned above            [ ]Unable to obtain    Physical Exam:  T(C): 37.1 (02-12-23 @ 10:54), Max: 37.1 (02-12-23 @ 10:54)  HR: 96 (02-12-23 @ 11:19) (96 - 130)  BP: 95/63 (02-12-23 @ 11:19) (92/64 - 115/73)  RR: 30 (02-12-23 @ 11:19) (30 - 36)  SpO2: 100% (02-12-23 @ 11:19) (93% - 100%)  Wt(kg): --    Daily     Daily     Appearance: NAD  Eyes: PERRL, EOMI  HENT: Normal oral mucosa, NC/AT  Cardiovascular: normal S1 and S2, RRR, no m/r/g, no edema, normal JVP  Procedural Access Site:  No hematoma, non-tender to palpation, 2+ pulses distally, no bruit, no ecchymosis  Respiratory: Clear to auscultation bilaterally  Gastrointestinal: Soft, non-tender, non-distended, BS+  Musculoskeletal: No clubbing, no joint deformity   Neurologic: Non-focal  Lymphatic: No lymphadenopathy  Psychiatry: AAOx3, mood & affect appropriate  Skin: No rashes, no ecchymoses, no cyanosis    Cardiovascular Diagnostic Testing:  ECG:    Echo:    Stress Testing:    Cath:    Interpretation of Telemetry:    Imaging:    Labs:                        10.5   19.74 )-----------( 311      ( 12 Feb 2023 10:22 )             36.1     02-12    136  |  97<L>  |  71<H>  ----------------------------<  258<H>  TNP   |  24  |  2.50<H>    Ca    9.4      12 Feb 2023 10:22    TPro  10.7<H>  /  Alb  3.4  /  TBili  0.8  /  DBili  x   /  AST  41<H>  /  ALT  14  /  AlkPhos  94  02-12    PT/INR - ( 12 Feb 2023 10:22 )   PT: 14.8 sec;   INR: 1.27 ratio         PTT - ( 12 Feb 2023 10:22 )  PTT:40.5 sec      Serum Pro-Brain Natriuretic Peptide: 93222 pg/mL (02-12 @ 10:22)           HPI:  Mr. Oneal is a 83M with a PMH of Ischemic Cardiomyopathy (LVEF in 2019 25%) refused Defibrillator in the past, CAD (multiple stents 5-6 last stent over 10 years ago at [facility]), T2DM, Bronchiectasis?, CKD3, HTN, HLD presenting with worsening shortness of breath. Patient is Chinese-speaking son at bedside able to translate and provide more collateral. Reportedly for the past few days has had worsening SOB on exertion, orthopnea and PND. Has not seen his Cardiologist for a long time. Has also chest pain worsened with inspiration and has been coughing clear sputum frequently for the past few days. Had a PCP appointment over the phone about a week ago and suggested to continue his Bumex 1mg every twelve hours. Son reports he has some swallowing difficulty so eats thickened foods but may sometimes not drink thickened water and chokes/coughs. Denies fevers/chills, nausea, vomiting or diarrhea. Sometimes may not take his medications every day. Reportedly has not had a decrease in urine output and adheres to a low salt diet.     In the ED noted to be hypoxic and placed on BIPAP. Currently talking A/O x 3 SpO2 in high 90s.    PMH:   DM (diabetes mellitus)    Afib      PSH:     Medications:     Allergies:  No Known Allergies    FAMILY HISTORY:    Social History:  Smoking: former  Alcohol: denies  Drugs: denies    Review of Systems:  Constitutional: [ ] Fever [ ] Chills [ ] Fatigue [ ] Weight change   HEENT: [ ] Blurred vision [ ] Eye Pain [ ] Headache [ ] Runny nose [ ] Sore Throat   Respiratory: [ ] Cough [ ] Wheezing [ ] Shortness of breath  Cardiovascular: [ ] Chest Pain [ ] Palpitations [ ] PARIKH [ ] PND [ ] Orthopnea  Gastrointestinal: [ ] Abdominal Pain [ ] Diarrhea [ ] Constipation [ ] Hemorrhoids [ ] Nausea [ ] Vomiting  Genitourinary: [ ] Nocturia [ ] Dysuria [ ] Incontinence  Extremities: [ ] Swelling [ ] Joint Pain  Neurologic: [ ] Focal deficit [ ] Paresthesias [ ] Syncope  Lymphatic: [ ] Swelling [ ] Lymphadenopathy   Skin: [ ] Rash [ ] Ecchymoses [ ] Wounds [ ] Lesions  Psychiatry: [ ] Depression [ ] Suicidal/Homicidal Ideation [ ] Anxiety [ ] Sleep Disturbances  [X ] 10 point review of systems is otherwise negative except as mentioned above            [ ]Unable to obtain    Physical Exam:  T(C): 37.1 (02-12-23 @ 10:54), Max: 37.1 (02-12-23 @ 10:54)  HR: 96 (02-12-23 @ 11:19) (96 - 130)  BP: 95/63 (02-12-23 @ 11:19) (92/64 - 115/73)  RR: 30 (02-12-23 @ 11:19) (30 - 36)  SpO2: 100% (02-12-23 @ 11:19) (93% - 100%)  Wt(kg): --    Daily     Daily     Appearance: NAD on BIPAP 10/5  Eyes: PERRL, EOMI  HENT: Normal oral mucosa, NC/AT  Cardiovascular: normal S1 and S2, irregular rhythm tachycardic no LE edema +JVD  Respiratory: +crackles bilaterally   Gastrointestinal: Soft, non-tender, non-distended, BS+  Musculoskeletal: No clubbing, no joint deformity   Neurologic: Non-focal  Lymphatic: No lymphadenopathy  Psychiatry: AAOx3,  Skin: No rashes, no ecchymoses, no cyanosis    Cardiovascular Diagnostic Testing:      Labs:                        10.5   19.74 )-----------( 311      ( 12 Feb 2023 10:22 )             36.1     02-12    136  |  97<L>  |  71<H>  ----------------------------<  258<H>  TNP   |  24  |  2.50<H>    Ca    9.4      12 Feb 2023 10:22    TPro  10.7<H>  /  Alb  3.4  /  TBili  0.8  /  DBili  x   /  AST  41<H>  /  ALT  14  /  AlkPhos  94  02-12    PT/INR - ( 12 Feb 2023 10:22 )   PT: 14.8 sec;   INR: 1.27 ratio         PTT - ( 12 Feb 2023 10:22 )  PTT:40.5 sec      Serum Pro-Brain Natriuretic Peptide: 44737 pg/mL (02-12 @ 10:22)           HPI:  Mr. Oneal is a 83M with a PMH of Ischemic Cardiomyopathy (LVEF in 2019 25%) refused Defibrillator in the past, CAD (multiple stents 5-6 last stent over 10 years ago [facility]), T2DM, Bronchiectasis?, CKD3, HTN, HLD presenting with worsening shortness of breath. Patient is Eritrean-speaking son at bedside able to translate and provide more collateral. Reportedly for the past few days had worsening SOB on exertion, orthopnea and PND. Has not seen his Cardiologist for a long time. Has also chest pain worsened with inspiration and has been coughing clear sputum frequently for the past few days. Had a PCP appointment over the phone about a week ago and suggested to continue his Bumex 1mg every twelve hours. Son reports he has some swallowing difficulty so eats thickened foods but may sometimes not drink thickened water and chokes/coughs. Denies fevers/chills, nausea, vomiting or diarrhea. Sometimes may not take his medications every day. Reportedly has not had a decrease in urine output and adheres to a low salt diet.     In the ED noted to be hypoxic and placed on BIPAP. Currently talking A/O x 3 SpO2 in high 90s.    PMH:   DM (diabetes mellitus)    Afib      PSH:     Medications:     Allergies:  No Known Allergies    FAMILY HISTORY:    Social History:  Smoking: former  Alcohol: denies  Drugs: denies    Review of Systems:  Constitutional: [ ] Fever [ ] Chills [ ] Fatigue [ ] Weight change   HEENT: [ ] Blurred vision [ ] Eye Pain [ ] Headache [ ] Runny nose [ ] Sore Throat   Respiratory: [ ] Cough [ ] Wheezing [ ] Shortness of breath  Cardiovascular: [ ] Chest Pain [ ] Palpitations [ ] PARIKH [ ] PND [ ] Orthopnea  Gastrointestinal: [ ] Abdominal Pain [ ] Diarrhea [ ] Constipation [ ] Hemorrhoids [ ] Nausea [ ] Vomiting  Genitourinary: [ ] Nocturia [ ] Dysuria [ ] Incontinence  Extremities: [ ] Swelling [ ] Joint Pain  Neurologic: [ ] Focal deficit [ ] Paresthesias [ ] Syncope  Lymphatic: [ ] Swelling [ ] Lymphadenopathy   Skin: [ ] Rash [ ] Ecchymoses [ ] Wounds [ ] Lesions  Psychiatry: [ ] Depression [ ] Suicidal/Homicidal Ideation [ ] Anxiety [ ] Sleep Disturbances  [X ] 10 point review of systems is otherwise negative except as mentioned above            [ ]Unable to obtain    Physical Exam:  T(C): 37.1 (02-12-23 @ 10:54), Max: 37.1 (02-12-23 @ 10:54)  HR: 96 (02-12-23 @ 11:19) (96 - 130)  BP: 95/63 (02-12-23 @ 11:19) (92/64 - 115/73)  RR: 30 (02-12-23 @ 11:19) (30 - 36)  SpO2: 100% (02-12-23 @ 11:19) (93% - 100%)  Wt(kg): --    Daily     Daily     Appearance: NAD on BIPAP 10/5  Eyes: PERRL, EOMI  HENT: Normal oral mucosa, NC/AT  Cardiovascular: normal S1 and S2, irregular rhythm tachycardic no LE edema +JVD  Respiratory: +crackles bilaterally   Gastrointestinal: Soft, non-tender, non-distended, BS+  Musculoskeletal: No clubbing, no joint deformity   Neurologic: Non-focal  Lymphatic: No lymphadenopathy  Psychiatry: AAOx3,  Skin: No rashes, no ecchymoses, no cyanosis    Cardiovascular Diagnostic Testing:      Labs:                        10.5   19.74 )-----------( 311      ( 12 Feb 2023 10:22 )             36.1     02-12    136  |  97<L>  |  71<H>  ----------------------------<  258<H>  TNP   |  24  |  2.50<H>    Ca    9.4      12 Feb 2023 10:22    TPro  10.7<H>  /  Alb  3.4  /  TBili  0.8  /  DBili  x   /  AST  41<H>  /  ALT  14  /  AlkPhos  94  02-12    PT/INR - ( 12 Feb 2023 10:22 )   PT: 14.8 sec;   INR: 1.27 ratio         PTT - ( 12 Feb 2023 10:22 )  PTT:40.5 sec      Serum Pro-Brain Natriuretic Peptide: 16148 pg/mL (02-12 @ 10:22)

## 2023-02-12 NOTE — H&P ADULT - PROBLEM SELECTOR PLAN 2
B/L pleural effusions . BNP 11K iso of PNA   S/P Bumex 2 x1 in ED   BIPAP 10/5 with FIO2 40% pulling good TV and not tachypneic   Drew placed in ED   - Strict IO   - Daily weights   - Replete lytes as needed   - F/U CCU reccs   - C/w Bumex 1mg q12   - TTE B/L pleural effusions . BNP 11K iso of PNA   S/P Bumex 2 x1 in ED   BIPAP 10/5 with FIO2 40% pulling good TV and not tachypneic   Drew placed in ED   TTE 2019 LVEF 25% refusing defibrillator  - Strict IO   - Daily weights   - Replete lytes as needed   - F/U CCU reccs   - C/w Bumex 1mg q12   - TTE B/L pleural effusions . BNP 11K iso of PNA   S/P Bumex 2 x1 in ED   BIPAP 10/5 with FIO2 40% pulling good TV and not tachypneic   Drew placed in ED   TTE 2019 LVEF 25% refusing defibrillator  - Strict IO   - Daily weights   - Replete lytes as needed   - F/U CCU reccs   - C/w Bumex 1mg q12   - TTE  - Hold Digoxin for now (hyperkalemic on admission) resume when safe to do so

## 2023-02-12 NOTE — ED PROVIDER NOTE - PHYSICAL EXAMINATION
Gen - Grossly short of breath but follows commands, mentating fully  HEENT - NCAT, EOMI  Neck - supple  Resp - bibasilar crackles, +increased WOB, tachypneic  CV - tachycardic, no m/r/g  Abd - soft, NT, ND; no guarding or rebound  MSK - no gross deformities  Extrem - no LE edema/erythema/tenderness  Neuro - no focal motor or sensation deficits  Skin - warm, well perfused

## 2023-02-13 DIAGNOSIS — I21.A1 MYOCARDIAL INFARCTION TYPE 2: ICD-10-CM

## 2023-02-13 DIAGNOSIS — J44.1 CHRONIC OBSTRUCTIVE PULMONARY DISEASE WITH (ACUTE) EXACERBATION: ICD-10-CM

## 2023-02-13 DIAGNOSIS — J18.9 PNEUMONIA, UNSPECIFIED ORGANISM: ICD-10-CM

## 2023-02-13 DIAGNOSIS — R77.8 OTHER SPECIFIED ABNORMALITIES OF PLASMA PROTEINS: ICD-10-CM

## 2023-02-13 DIAGNOSIS — E11.9 TYPE 2 DIABETES MELLITUS WITHOUT COMPLICATIONS: ICD-10-CM

## 2023-02-13 DIAGNOSIS — I48.20 CHRONIC ATRIAL FIBRILLATION, UNSPECIFIED: ICD-10-CM

## 2023-02-13 LAB
A1C WITH ESTIMATED AVERAGE GLUCOSE RESULT: 7.4 % — HIGH (ref 4–5.6)
ALBUMIN SERPL ELPH-MCNC: 2.9 G/DL — LOW (ref 3.3–5)
ALP SERPL-CCNC: 77 U/L — SIGNIFICANT CHANGE UP (ref 40–120)
ALT FLD-CCNC: 10 U/L — SIGNIFICANT CHANGE UP (ref 4–41)
ANION GAP SERPL CALC-SCNC: 15 MMOL/L — HIGH (ref 7–14)
ANISOCYTOSIS BLD QL: SLIGHT — SIGNIFICANT CHANGE UP
APTT BLD: 35.9 SEC — SIGNIFICANT CHANGE UP (ref 27–36.3)
AST SERPL-CCNC: 18 U/L — SIGNIFICANT CHANGE UP (ref 4–40)
BASE EXCESS BLDV CALC-SCNC: 2.2 MMOL/L — SIGNIFICANT CHANGE UP (ref -2–3)
BASOPHILS # BLD AUTO: 0 K/UL — SIGNIFICANT CHANGE UP (ref 0–0.2)
BASOPHILS NFR BLD AUTO: 0 % — SIGNIFICANT CHANGE UP (ref 0–2)
BILIRUB SERPL-MCNC: 0.8 MG/DL — SIGNIFICANT CHANGE UP (ref 0.2–1.2)
BUN SERPL-MCNC: 80 MG/DL — HIGH (ref 7–23)
CA-I SERPL-SCNC: 1.16 MMOL/L — SIGNIFICANT CHANGE UP (ref 1.15–1.33)
CALCIUM SERPL-MCNC: 8.9 MG/DL — SIGNIFICANT CHANGE UP (ref 8.4–10.5)
CHLORIDE BLDV-SCNC: 102 MMOL/L — SIGNIFICANT CHANGE UP (ref 96–108)
CHLORIDE SERPL-SCNC: 99 MMOL/L — SIGNIFICANT CHANGE UP (ref 98–107)
CK MB BLD-MCNC: 13.1 % — HIGH (ref 0–2.5)
CK MB CFR SERPL CALC: 8.8 NG/ML — HIGH
CK SERPL-CCNC: 67 U/L — SIGNIFICANT CHANGE UP (ref 30–200)
CO2 BLDV-SCNC: 30.7 MMOL/L — HIGH (ref 22–26)
CO2 SERPL-SCNC: 26 MMOL/L — SIGNIFICANT CHANGE UP (ref 22–31)
CREAT ?TM UR-MCNC: 46 MG/DL — SIGNIFICANT CHANGE UP
CREAT SERPL-MCNC: 2.64 MG/DL — HIGH (ref 0.5–1.3)
CULTURE RESULTS: SIGNIFICANT CHANGE UP
EGFR: 23 ML/MIN/1.73M2 — LOW
EOSINOPHIL # BLD AUTO: 0.14 K/UL — SIGNIFICANT CHANGE UP (ref 0–0.5)
EOSINOPHIL NFR BLD AUTO: 0.9 % — SIGNIFICANT CHANGE UP (ref 0–6)
ESTIMATED AVERAGE GLUCOSE: 166 — SIGNIFICANT CHANGE UP
GAS PNL BLDV: 138 MMOL/L — SIGNIFICANT CHANGE UP (ref 136–145)
GAS PNL BLDV: SIGNIFICANT CHANGE UP
GAS PNL BLDV: SIGNIFICANT CHANGE UP
GLUCOSE BLDC GLUCOMTR-MCNC: 279 MG/DL — HIGH (ref 70–99)
GLUCOSE BLDC GLUCOMTR-MCNC: 438 MG/DL — HIGH (ref 70–99)
GLUCOSE BLDC GLUCOMTR-MCNC: 464 MG/DL — CRITICAL HIGH (ref 70–99)
GLUCOSE BLDC GLUCOMTR-MCNC: 483 MG/DL — CRITICAL HIGH (ref 70–99)
GLUCOSE BLDC GLUCOMTR-MCNC: 490 MG/DL — CRITICAL HIGH (ref 70–99)
GLUCOSE BLDC GLUCOMTR-MCNC: 494 MG/DL — CRITICAL HIGH (ref 70–99)
GLUCOSE BLDC GLUCOMTR-MCNC: 533 MG/DL — CRITICAL HIGH (ref 70–99)
GLUCOSE BLDC GLUCOMTR-MCNC: 541 MG/DL — CRITICAL HIGH (ref 70–99)
GLUCOSE BLDV-MCNC: 230 MG/DL — HIGH (ref 70–99)
GLUCOSE SERPL-MCNC: 222 MG/DL — HIGH (ref 70–99)
HCO3 BLDV-SCNC: 29 MMOL/L — SIGNIFICANT CHANGE UP (ref 22–29)
HCT VFR BLD CALC: 33.7 % — LOW (ref 39–50)
HCT VFR BLDA CALC: 33 % — LOW (ref 39–51)
HGB BLD CALC-MCNC: 10.9 G/DL — LOW (ref 12.6–17.4)
HGB BLD-MCNC: 10.3 G/DL — LOW (ref 13–17)
HYPOCHROMIA BLD QL: SLIGHT — SIGNIFICANT CHANGE UP
IANC: 11.95 K/UL — HIGH (ref 1.8–7.4)
INR BLD: 1.28 RATIO — HIGH (ref 0.88–1.16)
LACTATE BLDV-MCNC: 1.2 MMOL/L — SIGNIFICANT CHANGE UP (ref 0.5–2)
LYMPHOCYTES # BLD AUTO: 1.26 K/UL — SIGNIFICANT CHANGE UP (ref 1–3.3)
LYMPHOCYTES # BLD AUTO: 7.9 % — LOW (ref 13–44)
MACROCYTES BLD QL: SLIGHT — SIGNIFICANT CHANGE UP
MAGNESIUM SERPL-MCNC: 2.6 MG/DL — SIGNIFICANT CHANGE UP (ref 1.6–2.6)
MANUAL SMEAR VERIFICATION: SIGNIFICANT CHANGE UP
MCHC RBC-ENTMCNC: 28.1 PG — SIGNIFICANT CHANGE UP (ref 27–34)
MCHC RBC-ENTMCNC: 30.6 GM/DL — LOW (ref 32–36)
MCV RBC AUTO: 92.1 FL — SIGNIFICANT CHANGE UP (ref 80–100)
MONOCYTES # BLD AUTO: 1.67 K/UL — HIGH (ref 0–0.9)
MONOCYTES NFR BLD AUTO: 10.5 % — SIGNIFICANT CHANGE UP (ref 2–14)
NEUTROPHILS # BLD AUTO: 12.68 K/UL — HIGH (ref 1.8–7.4)
NEUTROPHILS NFR BLD AUTO: 79.8 % — HIGH (ref 43–77)
OSMOLALITY UR: 378 MOSM/KG — SIGNIFICANT CHANGE UP (ref 50–1200)
PCO2 BLDV: 55 MMHG — SIGNIFICANT CHANGE UP (ref 42–55)
PH BLDV: 7.33 — SIGNIFICANT CHANGE UP (ref 7.32–7.43)
PHOSPHATE SERPL-MCNC: 5.4 MG/DL — HIGH (ref 2.5–4.5)
PLAT MORPH BLD: NORMAL — SIGNIFICANT CHANGE UP
PLATELET # BLD AUTO: 222 K/UL — SIGNIFICANT CHANGE UP (ref 150–400)
PLATELET COUNT - ESTIMATE: NORMAL — SIGNIFICANT CHANGE UP
PO2 BLDV: 55 MMHG — HIGH (ref 25–45)
POIKILOCYTOSIS BLD QL AUTO: SLIGHT — SIGNIFICANT CHANGE UP
POLYCHROMASIA BLD QL SMEAR: SLIGHT — SIGNIFICANT CHANGE UP
POTASSIUM BLDV-SCNC: 4.5 MMOL/L — SIGNIFICANT CHANGE UP (ref 3.5–5.1)
POTASSIUM SERPL-MCNC: 4.5 MMOL/L — SIGNIFICANT CHANGE UP (ref 3.5–5.3)
POTASSIUM SERPL-SCNC: 4.5 MMOL/L — SIGNIFICANT CHANGE UP (ref 3.5–5.3)
POTASSIUM UR-SCNC: 49.1 MMOL/L — SIGNIFICANT CHANGE UP
PROT ?TM UR-MCNC: 27 MG/DL — SIGNIFICANT CHANGE UP
PROT SERPL-MCNC: 9 G/DL — HIGH (ref 6–8.3)
PROT/CREAT UR-RTO: 0.6 RATIO — HIGH (ref 0–0.2)
PROTHROM AB SERPL-ACNC: 14.9 SEC — HIGH (ref 10.5–13.4)
RBC # BLD: 3.66 M/UL — LOW (ref 4.2–5.8)
RBC # FLD: 13.9 % — SIGNIFICANT CHANGE UP (ref 10.3–14.5)
RBC BLD AUTO: ABNORMAL
SAO2 % BLDV: 86.4 % — SIGNIFICANT CHANGE UP (ref 67–88)
SODIUM SERPL-SCNC: 140 MMOL/L — SIGNIFICANT CHANGE UP (ref 135–145)
SODIUM UR-SCNC: 28 MMOL/L — SIGNIFICANT CHANGE UP
SPECIMEN SOURCE: SIGNIFICANT CHANGE UP
TARGETS BLD QL SMEAR: SLIGHT — SIGNIFICANT CHANGE UP
TROPONIN T, HIGH SENSITIVITY RESULT: 215 NG/L — CRITICAL HIGH
TROPONIN T, HIGH SENSITIVITY RESULT: 217 NG/L — CRITICAL HIGH
UUN UR-MCNC: 607 MG/DL — SIGNIFICANT CHANGE UP
VARIANT LYMPHS # BLD: 0.9 % — SIGNIFICANT CHANGE UP (ref 0–6)
WBC # BLD: 15.89 K/UL — HIGH (ref 3.8–10.5)
WBC # FLD AUTO: 15.89 K/UL — HIGH (ref 3.8–10.5)

## 2023-02-13 PROCEDURE — 99222 1ST HOSP IP/OBS MODERATE 55: CPT | Mod: GC

## 2023-02-13 PROCEDURE — 93306 TTE W/DOPPLER COMPLETE: CPT | Mod: 26

## 2023-02-13 PROCEDURE — 99233 SBSQ HOSP IP/OBS HIGH 50: CPT | Mod: GC

## 2023-02-13 RX ORDER — CEFEPIME 1 G/1
2000 INJECTION, POWDER, FOR SOLUTION INTRAMUSCULAR; INTRAVENOUS EVERY 12 HOURS
Refills: 0 | Status: DISCONTINUED | OUTPATIENT
Start: 2023-02-13 | End: 2023-02-13

## 2023-02-13 RX ORDER — IPRATROPIUM/ALBUTEROL SULFATE 18-103MCG
3 AEROSOL WITH ADAPTER (GRAM) INHALATION EVERY 6 HOURS
Refills: 0 | Status: DISCONTINUED | OUTPATIENT
Start: 2023-02-13 | End: 2023-02-15

## 2023-02-13 RX ORDER — INSULIN LISPRO 100/ML
25 VIAL (ML) SUBCUTANEOUS
Qty: 0 | Refills: 0 | DISCHARGE

## 2023-02-13 RX ORDER — ALBUTEROL 90 UG/1
2 AEROSOL, METERED ORAL
Refills: 0 | Status: DISCONTINUED | OUTPATIENT
Start: 2023-02-13 | End: 2023-02-15

## 2023-02-13 RX ORDER — CEFEPIME 1 G/1
1000 INJECTION, POWDER, FOR SOLUTION INTRAMUSCULAR; INTRAVENOUS EVERY 24 HOURS
Refills: 0 | Status: COMPLETED | OUTPATIENT
Start: 2023-02-13 | End: 2023-02-16

## 2023-02-13 RX ORDER — FLUTICASONE FUROATE, UMECLIDINIUM BROMIDE AND VILANTEROL TRIFENATATE 200; 62.5; 25 UG/1; UG/1; UG/1
0 POWDER RESPIRATORY (INHALATION)
Qty: 0 | Refills: 0 | DISCHARGE

## 2023-02-13 RX ORDER — INSULIN LISPRO 100 [IU]/ML
0 INJECTION, SUSPENSION SUBCUTANEOUS
Qty: 0 | Refills: 0 | DISCHARGE

## 2023-02-13 RX ORDER — ROSUVASTATIN CALCIUM 5 MG/1
0 TABLET ORAL
Qty: 0 | Refills: 0 | DISCHARGE

## 2023-02-13 RX ORDER — ALFUZOSIN HYDROCHLORIDE 10 MG/1
1 TABLET, EXTENDED RELEASE ORAL
Qty: 0 | Refills: 0 | DISCHARGE

## 2023-02-13 RX ORDER — FLUTICASONE FUROATE, UMECLIDINIUM BROMIDE AND VILANTEROL TRIFENATATE 200; 62.5; 25 UG/1; UG/1; UG/1
1 POWDER RESPIRATORY (INHALATION)
Qty: 0 | Refills: 0 | DISCHARGE

## 2023-02-13 RX ORDER — TAMSULOSIN HYDROCHLORIDE 0.4 MG/1
0.8 CAPSULE ORAL AT BEDTIME
Refills: 0 | Status: DISCONTINUED | OUTPATIENT
Start: 2023-02-13 | End: 2023-02-18

## 2023-02-13 RX ORDER — ASPIRIN/CALCIUM CARB/MAGNESIUM 324 MG
81 TABLET ORAL DAILY
Refills: 0 | Status: DISCONTINUED | OUTPATIENT
Start: 2023-02-13 | End: 2023-02-14

## 2023-02-13 RX ORDER — ROSUVASTATIN CALCIUM 5 MG/1
1 TABLET ORAL
Qty: 0 | Refills: 0 | DISCHARGE

## 2023-02-13 RX ORDER — DIGOXIN 250 MCG
0 TABLET ORAL
Qty: 0 | Refills: 0 | DISCHARGE

## 2023-02-13 RX ORDER — MONTELUKAST 4 MG/1
0 TABLET, CHEWABLE ORAL
Qty: 0 | Refills: 0 | DISCHARGE

## 2023-02-13 RX ORDER — METOPROLOL TARTRATE 50 MG
12.5 TABLET ORAL
Refills: 0 | Status: DISCONTINUED | OUTPATIENT
Start: 2023-02-13 | End: 2023-02-14

## 2023-02-13 RX ORDER — DUTASTERIDE 0.5 MG/1
0 CAPSULE, LIQUID FILLED ORAL
Qty: 0 | Refills: 0 | DISCHARGE

## 2023-02-13 RX ORDER — INSULIN LISPRO 100/ML
VIAL (ML) SUBCUTANEOUS
Refills: 0 | Status: DISCONTINUED | OUTPATIENT
Start: 2023-02-13 | End: 2023-02-18

## 2023-02-13 RX ORDER — BUMETANIDE 0.25 MG/ML
1 INJECTION INTRAMUSCULAR; INTRAVENOUS ONCE
Refills: 0 | Status: DISCONTINUED | OUTPATIENT
Start: 2023-02-13 | End: 2023-02-13

## 2023-02-13 RX ORDER — ALBUTEROL 90 UG/1
2 AEROSOL, METERED ORAL
Qty: 0 | Refills: 0 | DISCHARGE

## 2023-02-13 RX ORDER — INSULIN GLARGINE 100 [IU]/ML
4 INJECTION, SOLUTION SUBCUTANEOUS AT BEDTIME
Refills: 0 | Status: DISCONTINUED | OUTPATIENT
Start: 2023-02-13 | End: 2023-02-14

## 2023-02-13 RX ORDER — ALFUZOSIN HYDROCHLORIDE 10 MG/1
0 TABLET, EXTENDED RELEASE ORAL
Qty: 0 | Refills: 0 | DISCHARGE

## 2023-02-13 RX ORDER — BUMETANIDE 0.25 MG/ML
2 INJECTION INTRAMUSCULAR; INTRAVENOUS EVERY 12 HOURS
Refills: 0 | Status: DISCONTINUED | OUTPATIENT
Start: 2023-02-13 | End: 2023-02-14

## 2023-02-13 RX ORDER — INSULIN LISPRO 100/ML
2 VIAL (ML) SUBCUTANEOUS
Refills: 0 | Status: DISCONTINUED | OUTPATIENT
Start: 2023-02-13 | End: 2023-02-13

## 2023-02-13 RX ORDER — INSULIN LISPRO 100/ML
4 VIAL (ML) SUBCUTANEOUS
Refills: 0 | Status: DISCONTINUED | OUTPATIENT
Start: 2023-02-13 | End: 2023-02-14

## 2023-02-13 RX ORDER — DIGOXIN 250 MCG
125 TABLET ORAL EVERY OTHER DAY
Refills: 0 | Status: DISCONTINUED | OUTPATIENT
Start: 2023-02-13 | End: 2023-02-18

## 2023-02-13 RX ORDER — DUTASTERIDE 0.5 MG/1
1 CAPSULE, LIQUID FILLED ORAL
Qty: 0 | Refills: 0 | DISCHARGE

## 2023-02-13 RX ORDER — ALBUTEROL 90 UG/1
0 AEROSOL, METERED ORAL
Qty: 0 | Refills: 0 | DISCHARGE

## 2023-02-13 RX ORDER — METOPROLOL TARTRATE 50 MG
12.5 TABLET ORAL
Qty: 0 | Refills: 0 | DISCHARGE

## 2023-02-13 RX ORDER — MONTELUKAST 4 MG/1
1 TABLET, CHEWABLE ORAL
Qty: 0 | Refills: 0 | DISCHARGE

## 2023-02-13 RX ORDER — APIXABAN 2.5 MG/1
0 TABLET, FILM COATED ORAL
Qty: 0 | Refills: 0 | DISCHARGE

## 2023-02-13 RX ORDER — BUMETANIDE 0.25 MG/ML
0 INJECTION INTRAMUSCULAR; INTRAVENOUS
Qty: 0 | Refills: 0 | DISCHARGE

## 2023-02-13 RX ADMIN — ATORVASTATIN CALCIUM 40 MILLIGRAM(S): 80 TABLET, FILM COATED ORAL at 21:34

## 2023-02-13 RX ADMIN — PIPERACILLIN AND TAZOBACTAM 25 GRAM(S): 4; .5 INJECTION, POWDER, LYOPHILIZED, FOR SOLUTION INTRAVENOUS at 07:49

## 2023-02-13 RX ADMIN — BUMETANIDE 2 MILLIGRAM(S): 0.25 INJECTION INTRAMUSCULAR; INTRAVENOUS at 15:18

## 2023-02-13 RX ADMIN — TAMSULOSIN HYDROCHLORIDE 0.4 MILLIGRAM(S): 0.4 CAPSULE ORAL at 21:45

## 2023-02-13 RX ADMIN — Medication 12.5 MILLIGRAM(S): at 09:33

## 2023-02-13 RX ADMIN — Medication 6: at 17:27

## 2023-02-13 RX ADMIN — Medication 3: at 13:18

## 2023-02-13 RX ADMIN — APIXABAN 2.5 MILLIGRAM(S): 2.5 TABLET, FILM COATED ORAL at 09:33

## 2023-02-13 RX ADMIN — Medication 2: at 09:32

## 2023-02-13 RX ADMIN — Medication 3 MILLILITER(S): at 21:11

## 2023-02-13 RX ADMIN — Medication 2 UNIT(S): at 17:27

## 2023-02-13 RX ADMIN — Medication 12: at 20:07

## 2023-02-13 RX ADMIN — CEFEPIME 100 MILLIGRAM(S): 1 INJECTION, POWDER, FOR SOLUTION INTRAMUSCULAR; INTRAVENOUS at 15:18

## 2023-02-13 RX ADMIN — Medication 3 MILLILITER(S): at 13:19

## 2023-02-13 RX ADMIN — Medication 40 MILLIGRAM(S): at 07:51

## 2023-02-13 RX ADMIN — INSULIN GLARGINE 4 UNIT(S): 100 INJECTION, SOLUTION SUBCUTANEOUS at 23:18

## 2023-02-13 RX ADMIN — APIXABAN 2.5 MILLIGRAM(S): 2.5 TABLET, FILM COATED ORAL at 21:34

## 2023-02-13 RX ADMIN — Medication 3 MILLILITER(S): at 07:49

## 2023-02-13 RX ADMIN — BUMETANIDE 1 MILLIGRAM(S): 0.25 INJECTION INTRAMUSCULAR; INTRAVENOUS at 04:05

## 2023-02-13 RX ADMIN — Medication 4: at 23:17

## 2023-02-13 NOTE — PROGRESS NOTE ADULT - ASSESSMENT
83M with a PMH of Ischemic Cardiomyopathy (LVEF in 2019 25%) refused Defibrillator in the past, CAD (multiple stents 5-6 last stent over 10 years ago at [facility]), T2DM, afib, CKD3, HTN, HLD , afib presenting with AHRF iso CHF exacerbation with sepsis likely 2/2 Aspiration PNA and sebastián.  83M with a PMH of Ischemic Cardiomyopathy (LVEF in 2019 25%) refused Defibrillator in the past, CAD (multiple stents 5-6 last stent over 10 years ago at [facility]), T2DM, afib, CKD3, HTN, HLD , afib presenting with AHRF iso CHF exacerbation with sepsis likely 2/2 aspiration PNA and ZACHARY. s/p BiPAP treatment and now on NC with adequate oxygenation and improvement in hypercarbia. Pending formal S&S eval for concern for aspiration.  83M with a PMH of Ischemic Cardiomyopathy (LVEF in 2019 25%) refused Defibrillator in the past, CAD (multiple stents 5-6 last stent over 10 years ago at [facility]), T2DM, afib, CKD3, HTN, HLD , afib presenting with AHRF iso CHF exacerbation with sepsis likely 2/2 aspiration PNA and ZACHARY. s/p BiPAP treatment and now on NC with adequate oxygenation and improvement in hypercarbia. Pending formal S&S eval for concern for aspiration.

## 2023-02-13 NOTE — PROGRESS NOTE ADULT - PROBLEM SELECTOR PLAN 9
Takes Loppresor 12.5 BID   - C/w Lopressor 12.5 BID Takes Lopressor 12.5 BID PO.  - c/w Lopressor 12.5 BID

## 2023-02-13 NOTE — PROGRESS NOTE ADULT - PROBLEM SELECTOR PLAN 7
Baseline Scr unknown no previous records   SCr: 2.78 with slight hyperkalemia 5.8 on admission   Drew placed in ED   Likely iso of CHF exacerbation and Sepsis   - Urine lytes   - Strict I&O   - Avoid nephrotoxin agents   - Renally dose meds Presenting with Cr: 2.78 with slight hyperkalemia 5.8 on admission. Baseline Scr unknown no previous records. Unclear if ZACHARY on CKD or baseline CKD.   - butcher placed in ED for UOP monitoring  - pending urine lytes   - Strict I&O   - pending collateral from PCP

## 2023-02-13 NOTE — PROGRESS NOTE ADULT - PROBLEM SELECTOR PLAN 10
DVT ppx: Heparin sq   Diet: NPO while on BIPAP, will need Dysphagia screen and S/S eval   Dispo: pending medical course     Fall precautions   Aspirations precautions DVT ppx: Heparin sq   Diet: pending S&S eval   Dispo: pending medical management

## 2023-02-13 NOTE — DISCHARGE NOTE PROVIDER - NSFOLLOWUPCLINICS_GEN_ALL_ED_FT
[facility] - Infectious Disease  Infectious Disease  [address], [place] [address]  Phone: [phone]  Fax:

## 2023-02-13 NOTE — PATIENT PROFILE ADULT - MEDICATIONS/VISITS
Routing refill request to provider for review/approval because:  Patient needs to be seen because it has been more than 1 year since last office visit.    Ibis Figueroa RN   no

## 2023-02-13 NOTE — PROGRESS NOTE ADULT - PROBLEM SELECTOR PLAN 1
AHRF with hypoxia to 70s via EMS placed in BIPAP   S/P cefepime and bumex in ED   - Respiratory status improved on BIPAP   - Acidosis improved   - C/w Bipap 10/5 wean as tolerable   - C/w treating underlying cause both PNA and CHF exacerbation as above   - Needs S/S and dysphagia eval before diet can be restarted Acute hypercarbic and hypoxic respiratory failure to 70s via EMS placed in BIPAP. On admission with pH 7.17 and pCO2 80s concerning for retention.   - placed on BiPAP overnight to offset hypercarbia and hypoxia with overall improvement  - weaned off BiPAP this AM and maintaining adequate O2 sat on NC  - resolution of respiratory acidosis this AM   - passed bedside dysphagia; pending formal S&S eval

## 2023-02-13 NOTE — DISCHARGE NOTE PROVIDER - NSDCCPCAREPLAN_GEN_ALL_CORE_FT
PRINCIPAL DISCHARGE DIAGNOSIS  Diagnosis: Acute respiratory failure with hypoxia  Assessment and Plan of Treatment: You came into the hospital with low oxygen saturation and a very high carbon dioxide level in your blood. You were placed on a BiPAP breathing machine in order to bring your levels back to normal. WIthin a day, you were able to be taken off of the BiPAP and transitioned to regular oxygen and had good oxygen levels. On discharge, you were mainintaing good levels of oxygen on 2L NC which is your reported home regimen.      SECONDARY DISCHARGE DIAGNOSES  Diagnosis: Sepsis due to pneumonia  Assessment and Plan of Treatment: When you presented to the hospital, you were found to be septic which was in the setting of pneumonia from an aspiration event. You were treated with IV antibiotics for this with improvement. To further evaluate your aspiration, you were evaluate by the Speech and Swallow specialists and the recommended an XR study of your throat. This study showed    Diagnosis: Acute on chronic systolic congestive heart failure  Assessment and Plan of Treatment: During your admission, there was also concern for an exacerbation of your chronic heart failure. You were given IV medications to help you pee out the fluid that you had accumulated. Other medications were added onto your regimen, however you refused to optimize your medication regimen. You also refused the placement of a defibrillator. On discharge, please follow up with your cardiologist for continued management.     PRINCIPAL DISCHARGE DIAGNOSIS  Diagnosis: Acute respiratory failure with hypoxia  Assessment and Plan of Treatment: You came into the hospital with low oxygen saturation and a very high carbon dioxide level in your blood. You were placed on a BiPAP breathing machine in order to bring your levels back to normal. WIthin a day, you were able to be taken off of the BiPAP and transitioned to regular oxygen and had good oxygen levels. On discharge, you were mainintaing good levels of oxygen on 2L NC which is your reported home regimen.      SECONDARY DISCHARGE DIAGNOSES  Diagnosis: Sepsis due to pneumonia  Assessment and Plan of Treatment: When you presented to the hospital, you were found to be septic which was in the setting of pneumonia from an aspiration event. You were treated with IV antibiotics (cefepime x 5 days) and transitioned to oral levofloxacin 500 x 2 days. On discharge, you will have one dose remainining so please take the medication on 2/19/2023. Your sputum culture also showed growth of mold. 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 Please obtain your medical records from [facility] so that the treatment there can be seen. You will need to follow with either your pulmonologist or the ID specialists for continued management and workup of the mold noted. No treatment for mold was initiated in the hospital until further information about it could be gathered. Please take your [facility] records to your doctor of choice so that therapy can be directed appropriately. You will need a repeat CT chest in 3 months to evaluate your lung pathology. To evaluate your aspiration, you were evaluate by the Speech and Swallow specialists and the recommended an XR study of your throat. This study showed that you were able to tolerate mildly thickened liquids.    Diagnosis: Acute on chronic systolic congestive heart failure  Assessment and Plan of Treatment: During your admission, there was also concern for an exacerbation of your chronic heart failure. You were given IV medications to help you pee out the fluid that you had accumulated. Other medications were added onto your regimen, however you refused to optimize your medication regimen. You also refused the placement of a defibrillator. On discharge, please follow up with your cardiologist for continued management. An appointment has already been made for you so that you may follow up.

## 2023-02-13 NOTE — DISCHARGE NOTE PROVIDER - CARE PROVIDER_API CALL
Jaime Arizmendi  Internal Medicine  [address] [place] [address]  Phone: [phone]  Fax: [phone]  Established Patient  Follow Up Time:

## 2023-02-13 NOTE — PROGRESS NOTE ADULT - PROBLEM SELECTOR PLAN 2
B/L pleural effusions . BNP 11K iso of PNA   S/P Bumex 2 x1 in ED   BIPAP 10/5 with FIO2 40% pulling good TV and not tachypneic   Drew placed in ED   TTE 2019 LVEF 25% refusing defibrillator  - Strict IO   - Daily weights   - Replete lytes as needed   - F/U CCU reccs   - C/w Bumex 1mg q12   - TTE  - Hold Digoxin for now (hyperkalemic on admission) resume when safe to do so Presenting with bilateral pleural effusions with BNP 11K iso of PNA. Unclear if compliant on medications in the outpatient setting. TTE 2019 LVEF 25% refusing defibrillator  - s/p Bumex 2 x1 in ED   - c/w bumex 2mg BID cait iso of decreased urine output   - s/p BIPAP 10/5 with FIO2 40% now transitioned to NC   - Strict I&Os and daily weights   - TTE to evaluate for if worsening heart failure

## 2023-02-13 NOTE — PROGRESS NOTE ADULT - PROBLEM SELECTOR PLAN 5
- hx of COPD  - noted on HPI that he was wheezing at home when he was feeling SOB  - likely to have copd eacerbation  - physical exam shows improved wheezing  - hypercarbia at admission likely contributory to COPD exacerbation    Plan:  - will start solumedrol 40mg Q12hr and wean down to prednisone  - duoneb Q6hr, albuterol Q3hr  - monitor respiratory status closely  - c/w BIPAP  - repeat VBG in the AM  - pulmonary consult On admission with elevated troponin of 70 but without chest pain. With increase in trops but peaked and downtrending at 215. Etiology likely demand ischemia secondary to afib RVR, CHF exacerbation and copd exacerbation.   - low suspicion for ACS per cardiology   - TTE pending

## 2023-02-13 NOTE — DISCHARGE NOTE PROVIDER - NSDCFUADDINST_GEN_ALL_CORE_FT
Aspiration Precautions:  -Small bites.  -Small sips.  -Alternate liquid and solid swallows.  -Ensure oral clearance after each bite.  -Sit upright (90 degrees) when eating and drinking and for 30 minutes after eating.  -Multiple swallows per bite/sip.  -Small, frequent meals throughout the day.  -Consume one pill at a time.

## 2023-02-13 NOTE — DISCHARGE NOTE PROVIDER - NSDCFUSCHEDAPPT_GEN_ALL_CORE_FT
[facility]  CARDIOLOGY [phone]  Scheduled Appointment: 03/17/2023

## 2023-02-13 NOTE — DISCHARGE NOTE PROVIDER - HOSPITAL COURSE
83M with a PMH of Ischemic Cardiomyopathy (LVEF in 2019 25%) refused Defibrillator in the past, CAD (multiple stents 5-6 last stent over 10 years ago at [facility]), T2DM, afib, CKD3, HTN, HLD , afib presenting with AHRF iso CHF exacerbation with sepsis likely 2/2 aspiration PNA and ZACHARY. Initiated on BiPAP treatment on admission and titrated off next AM to NRB -> NC with adequate oxygenation and resolution in hypercarbia. Diuresed with bumex 2 mg BID IV given concern for volume overload and then transitioned back to home bumex 1 PO BID. Also initiated on cefepime for concern for sepsis 2/2 aspiration PNA (2/12-). With some concern for COPD exacerbation on admission with minimal wheezing however, given course of prednisone x 5 days. 83M with a PMH of Ischemic Cardiomyopathy (LVEF in 2019 25%) refused Defibrillator in the past, CAD (multiple stents 5-6 last stent over 10 years ago at [facility]), T2DM, afib, CKD3, HTN, HLD , afib presenting with AHRF iso CHF exacerbation with sepsis likely 2/2 aspiration PNA and ZACHARY. Initiated on BiPAP treatment on admission and titrated off next AM to NRB -> NC with adequate oxygenation and resolution in hypercarbia. Diuresed with bumex 2 mg BID IV given concern for volume overload and then transitioned back to home bumex 1 PO BID. Also initiated on cefepime for concern for sepsis 2/2 aspiration PNA (2/12-2/16). Sputum cultures collected with evidence of Pseudomonas With some concern for COPD exacerbation on admission with minimal wheezing and given course of prednisone x 2 days; discontinued due to lower suspicion. Attempted to initiate patient on metoprolol tartrate 25 QD however, son refusing administration so continued on metoprolol tartrate 12.5 BID (home regimen). Also attempted to initiate spironolactone however son refusing.  XR cinesophogram performed to assess for cause of aspiration which showed --. 83M with a PMH of Ischemic Cardiomyopathy (LVEF in 2019 25%) refused Defibrillator in the past, CAD (multiple stents 5-6 last stent over 10 years ago at [facility]), T2DM, afib, CKD3, HTN, HLD , afib presenting with AHRF iso CHF exacerbation with sepsis likely 2/2 aspiration PNA and ZACHARY. Initiated on BiPAP treatment on admission and titrated off next AM to NRB -> NC with adequate oxygenation and resolution in hypercarbia. Diuresed with bumex 2 mg BID IV given concern for volume overload and then transitioned back to home bumex 1 PO BID. Also initiated on cefepime for concern for sepsis 2/2 aspiration PNA (2/12-2/16). Sputum cultures collected with evidence of Pseudomonas With some concern for COPD exacerbation on admission with minimal wheezing and given course of prednisone x 2 days; discontinued due to lower suspicion. Attempted to initiate patient on metoprolol tartrate 25 QD however, son refusing administration so continued on metoprolol tartrate 12.5 BID (home regimen). Also attempted to initiate spironolactone however son refusing.  XR cinesophogram performed showed no aspiration with mildly thick liquids. 83M with a PMH of Ischemic Cardiomyopathy (LVEF in 2019 25%) refused Defibrillator in the past, CAD (multiple stents 5-6 last stent over 10 years ago at [facility]), T2DM, afib, CKD3, HTN, HLD , afib presenting with AHRF iso CHF exacerbation with sepsis likely 2/2 aspiration PNA and ZACHARY. Initiated on BiPAP treatment on admission and titrated off next AM to NRB -> NC with adequate oxygenation and resolution in hypercarbia; on 2L NC at home. Diuresed with bumex 2 mg BID IV given concern for volume overload and then transitioned back to home bumex 1 PO BID. Also initiated on cefepime for concern for sepsis 2/2 aspiration PNA (2/12-2/16) and transitioned to levofloxacin 500 q48 (renally dosed) (2/17-2/19); will require one dose after discharge. Sputum cultures collected with evidence of Pseudomonas with some mold. ID evaluated and mold was present since March 2022. Fungitell and Aspergillus sent. No intervention required at this time but will need close follow up with private pulmonologist or ID to continued workup. Patient and family advised to get records from [facility] to guide treatment.  With some concern for COPD exacerbation on admission with minimal wheezing and given course of prednisone x 2 days; discontinued due to lower suspicion.   Attempted to initiate patient on metoprolol tartrate 25 QD however, son refusing administration so continued on metoprolol tartrate 12.5 BID (home regimen). Also attempted to initiate spironolactone however son refusing.  Refusing defibrillator during this hospital stay as well. Nuclear stress test with evidence of ischemia but no acute intervention needed. Follow up appointment with cardiology made at time of discharge. XR cinesophogram performed showed no aspiration with mildly thick liquids. Otherwise medically optimized for discharge home.

## 2023-02-13 NOTE — DISCHARGE NOTE PROVIDER - NSDCCPTREATMENT_GEN_ALL_CORE_FT
PRINCIPAL PROCEDURE  Procedure: CT chest wo con  Findings and Treatment: PROCEDURE:  CT of the Chest was performed.  Sagittal and coronal reformats were performed.  FINDINGS:  LUNGS AND AIRWAYS: Secretions throughout the trachea and right main stem   bronchus. Emphysema. Scattered bilateral nodular and patchy opacities   within the right upper, right middle and left upper lobes, the largest in   the right middle lobe.  PLEURA: Small bilateral pleural effusions with associated atelectasis.  MEDIASTINUM AND SAHIL: Mediastinal adenopathy, some which is high density.  VESSELS: Coronary artery calcifications.  HEART: Heart size is normal. No pericardial effusion. Aortic valvular   calcifications.  CHEST WALL AND LOWER NECK: Within normal limits.  VISUALIZED UPPER ABDOMEN: 2.7 cm right upper pole renal cyst  BONES: Degenerative changes.  IMPRESSION:  Emphysema with patchy bilateral lung opacities that may represent a   combination of fluid overload and infectious/inflammatory process.   Three-month follow-up chest CT recommended for complete evaluation.        SECONDARY PROCEDURE  Procedure: Nuclear medicine cardiopulmonary stress test  Findings and Treatment: NUCLEAR FINDINGS:  Review of raw data shows: The study is of good technical  quality.  The left ventricle was markdely dilated at baseline. There  are large, severe defects in anterior and anteroseptal,  apical walls that are fixed, suggestive of infarct.There  is a medium sized, severe defect in proximal to mid septal  wall that is fixed, suggestive of infarct. The best  perfusion was in the lateral / inferolateral wall. There  was increased RV tracer uptake.  ------------------------------------------------------------------------  GATED ANALYSIS:  Post-stress gated wall motion analysis was performed (LVEF  = 22 %;LVEDV = 190 ml.), revealing severe overall  hypokinesis. There was apical, anterior and anteroseptal  akinesis. There was proximal to mid septal akinesis. The  best motion was in the lateral and inferolateral walls. RV  size and function appeared normal.  ------------------------------------------------------------------------  IMPRESSIONS:Abnormal Study  * Myocardial Perfusion SPECT results are abnormal.  * Review of raw data shows: The study is of good technical  quality.  * The left ventricle was markdely dilated at baseline.  There are large, severe defects in anterior and  anteroseptal, apical walls that are fixed, suggestive of  infarct.There is a medium sized, severe defect in proximal  to mid septal wall that is fixed, suggestive of infarct.  The best perfusion was in the lateral / inferolateral  wall. There was increased RV tracer uptake.  * Post-stress gated wall motion analysis was performed  (LVEF = 22 %;LVEDV = 190 ml.), revealing severe overall  hypokinesis. There was apical, anterior and anteroseptal  akinesis. There was proximal to mid septal akinesis. The  best motion was in the lateral and inferolateral walls. RV  size and function appeared normal.

## 2023-02-13 NOTE — CHART NOTE - NSCHARTNOTEFT_GEN_A_CORE
Spoken with cardiology ACP at bedside while seeing the patient. No chest pain at this time. Troponins increasing with elevated CKMB. Cardiology does not think the patient needs DAPT or heparin drip at this time. Would monitor for now. Findings likely to be due to demand ischemia from ongoing sepsis and CHF exacerbation/COPD exacerbation. Will continue to trend troponins and to reach out to cards if clinical status changes. Cardiology to write recs. Spoken with cardiology ACP at bedside while seeing the patient. No chest pain at this time. Troponins increasing with elevated CKMB. EKG repeat shows afib but no longer in RVR, no ST changes. Cardiology does not think the patient needs DAPT or heparin drip at this time. Would monitor for now. Findings likely to be due to demand ischemia from ongoing sepsis and CHF exacerbation/COPD exacerbation. Will continue to trend troponins and to reach out to cards if clinical status changes. Cardiology to write recs.

## 2023-02-13 NOTE — PROGRESS NOTE ADULT - PROBLEM SELECTOR PLAN 8
Takes Humalog 25U BID at home   - Will keep NPO iso of aspiration and BIPAP  - Place on ISS qith q6 FS   - S/S eval   - Nutrition c/s Takes Humalog 25U BID at home; pending med rec.   - on ISS q6 FS while NPO and ISS w meal and bedtime with meals   - S/S eval before full diet is resumed

## 2023-02-13 NOTE — PROGRESS NOTE ADULT - PROBLEM SELECTOR PLAN 6
EKG shows afib RVR  - c/w eliquis EKG on admission shows afib RVR.  - c/w eliquis  - will resume digoxin 125 mcg q48 hours

## 2023-02-13 NOTE — CONSULT NOTE ADULT - ASSESSMENT
83M with a PMH of Ischemic Cardiomyopathy (LVEF in 2019 25%) refused Defibrillator in the past, CAD (multiple stents 5-6 last stent over 10 years ago at [facility]), T2DM, Bronchiectasis?, CKD3, HTN, HLD presenting with worsening shortness of breath. Cardiology consulted for elevated troponin    1. Hypoxic/Hypercarbic Respiratory Failure - likely in the setting of acute heart failure exacerbation also concerned for underlying aspiration? 8 months ago admitted to [facility] for aspiration PNA and has been coughing on water  2. Acute on Chronic Heart Failure - TTE 2019 LVEF 25% refusing defibrillator on Bumex 1mg PO Q12H  3. ZACHARY on CKD  4. Elevated Troponin      #Elevated Troponin  #Acute on Chronic Heart Failure   - Patient with elevated CE  - Troponinemia most likely in the setting of demand ischemia 2/2 to ZACHARY and HF  - Continue with BIPAP for now would repeat a gas given hypercarbia to assess improvement  - EKG shows no ischemic changes (Afib with known RBBB),  - Serial EKG PRN to assess for ST changes  - Continuous cardiac monitoring to monitor for arrhythmias  - CBC, CMP, coags, HbA1C, TSH, lipids for comorbidities, Trended to peak no concerns for ACS at this time.   - Trend lactate  - ECHO: TTE in am  - EKG and CE not consistent with ACS, would not treat for acs  - Continue to monitor for signs and symptoms of ischemic chest pain.   - Doubt acute atherothrombotic event given no ST or TW changes on EKG, . No chest pain at the time of my visit  - Continue IV Bumex 1mg Q12H  - Fluid restrict daily weights strict I/Os goal net negative 1-2L      Thank you, if any questions or clinical situation changes please call [facility] #[phone].  Case discussed with CHRISTOPHER on call  Bren Marroquin MD  Attending Attestation to follow

## 2023-02-13 NOTE — PROGRESS NOTE ADULT - PROBLEM SELECTOR PLAN 4
- patient has troponin of 70, but denies chest pain at this time  - likely demand ischemia secondary to afib RVR, CHF exacerbation and copd exacerbation  - will repeat troponins  - cardiology consult  - monitor for chest pain  - echo pending Having aspirations at home and was hospitalized 8 months ago for ASP PNA. CT chest with bilateral opacities c/f PNA with leukocytosis 19K   - s/p cefepime, flagyll and vancomycin in ED  - will continue with cefepime for broad coverage  - f/u sputum culture if patient is able to induce sputum  - MRSA swab   - aspiration precautions  - pending formal S&S eval

## 2023-02-13 NOTE — PROGRESS NOTE ADULT - PROBLEM SELECTOR PLAN 3
Having aspirations at home and was hospitalized 8 months ago for ASP pna   CT chest with B/L Opacities c/f PNA   WBC 19k  S/P Cefepime in ed   - F/U Sputum cx   - C/w Zosyn   - Star Vanc by lvl   - MRSA swab   - S/S eval  - nutrition c/s   - Aspiration precautions Patient with history of COPD with reported wheezing on admission.   - s/p solumedrol 40mg Q12hr yesterday  - will continue with prednisone 40 QD x 4 additional days   - duonebs q6 hours  - incentive spirometer

## 2023-02-13 NOTE — PATIENT PROFILE ADULT - FALL HARM RISK - HARM RISK INTERVENTIONS

## 2023-02-13 NOTE — PROGRESS NOTE ADULT - SUBJECTIVE AND OBJECTIVE BOX
Jeff Calderon  PGY-1 Resident Physician   Pager [phone]/ [phone]    Patient is a 83y old  Male who presents with a chief complaint of CHF exacerbation (2023 05:01)      SUBJECTIVE / OVERNIGHT EVENTS:  Patient seen and evaluated at bedside.    Denies any fevers, chills, CP, or SOB.    Vital Signs Last 24 Hrs  T(C): 37 (2023 07:19), Max: 37.1 (2023 10:54)  T(F): 98.6 (2023 07:19), Max: 98.8 (2023 10:54)  HR: 85 (2023 07:) (77 - 130)  BP: 103/63 (2023 07:19) (91/46 - 115/73)  BP(mean): 72 (2023 16:51) (72 - 72)  RR: 15 (:) (15 - 36)  SpO2: 100% (2023 07:) (93% - 100%)    Parameters below as of 2023 07:19  Patient On (Oxygen Delivery Method): BiPAP/CPAP        PHYSICAL EXAM:  GENERAL: NAD, well-developed  CHEST/LUNG: Clear to auscultation bilaterally; No wheeze  HEART: Regular rate and rhythm; Normal S1 S2, No murmurs, rubs, or gallops  ABDOMEN: Soft, Nontender, Nondistended; Bowel sounds present  EXTREMITIES:  2+ Peripheral Pulses, No clubbing, cyanosis, or edema  PSYCH: AAOx3    LABS:                        10.3   15.89 )-----------( 222      ( 2023 07:04 )             33.7     Hgb Trend: 10.3<--, 10.5<--  02-13    140  |  99  |  80<H>  ----------------------------<  222<H>  4.5   |  26  |  2.64<H>    Ca    8.9      2023 07:04  Phos  5.4     02-  Mg     2.60     -13    TPro  9.0<H>  /  Alb  2.9<L>  /  TBili  0.8  /  DBili  x   /  AST  18  /  ALT  10  /  AlkPhos  77  02-13    Creatinine Trend: 2.64<--, 2.65<--, 2.78<--, 2.22<--, 2.50<--  LIVER FUNCTIONS - ( 2023 07:04 )  Alb: 2.9 g/dL / Pro: 9.0 g/dL / ALK PHOS: 77 U/L / ALT: 10 U/L / AST: 18 U/L / GGT: x           PT/INR - ( 2023 07:04 )   PT: 14.9 sec;   INR: 1.28 ratio         PTT - ( 2023 07:04 )  PTT:35.9 sec  CARDIAC MARKERS ( 2023 20:13 )  x     / x     / 103 U/L / x     / 13.4 ng/mL      Urinalysis Basic - ( 2023 11:15 )    Color: Light Yellow / Appearance: Clear / S.011 / pH: x  Gluc: x / Ketone: Negative  / Bili: Negative / Urobili: <2 mg/dL   Blood: x / Protein: 30 mg/dL / Nitrite: Negative   Leuk Esterase: Large / RBC: 5 /HPF / WBC 46 /HPF   Sq Epi: x / Non Sq Epi: 2 /HPF / Bacteria: Negative     Jeff Calderon  PGY-1 Resident Physician   Pager [phone]/ [phone]    Patient is a 83y old  Male who presents with a chief complaint of CHF exacerbation (2023 05:01)      SUBJECTIVE / OVERNIGHT EVENTS:  Patient seen and evaluated at bedside.  This AM, patient initially seen with BiPAP. Reports that he is feeling well. Transitioned off of BiPAP to nonrebreather to 4L with 100% O2 maintained during conversation.   Spoke with son on phone at bedside who was inquiring why patient was not being fed and explained that while patients are on BiPAP, they are not allowed to eat due to risk of aspiration which is what his father came into the hospital with.   Son also asking why IVF were not started if NPO and explained that since the patient has an EF 25% and being treated for volume overload, giving fluids is not the best course of action.   Son states that he is a physician.    Vital Signs Last 24 Hrs  T(C): 37 (2023 07:19), Max: 37.1 (2023 10:54)  T(F): 98.6 (2023 07:19), Max: 98.8 (2023 10:54)  HR: 85 (2023 07:19) (77 - 130)  BP: 103/63 (2023 07:19) (91/46 - 115/73)  BP(mean): 72 (2023 16:51) (72 - 72)  RR: 15 (2023 07:19) (15 - 36)  SpO2: 100% (2023 07:19) (93% - 100%)    Parameters below as of 2023 07:19  Patient On (Oxygen Delivery Method): BiPAP/CPAP        PHYSICAL EXAM:  GENERAL: tired appearing and laying in bed  CHEST/LUNG: crackles noted in his bilateral lung bases, no wheezes noted on exam.  HEART: Regular rate and rhythm; Normal S1 S2, No murmurs, rubs, or gallops  ABDOMEN: Soft, Nontender, Nondistended; Bowel sounds present  EXTREMITIES:  2+ Peripheral Pulses, No clubbing, cyanosis, or edema  PSYCH: AAOx3    LABS:                        10.3   15.89 )-----------( 222      ( 2023 07:04 )             33.7     Hgb Trend: 10.3<--, 10.5<--  02-13    140  |  99  |  80<H>  ----------------------------<  222<H>  4.5   |  26  |  2.64<H>    Ca    8.9      2023 07:04  Phos  5.4     02-13  Mg     2.60     0213    TPro  9.0<H>  /  Alb  2.9<L>  /  TBili  0.8  /  DBili  x   /  AST  18  /  ALT  10  /  AlkPhos  77  02-13    Creatinine Trend: 2.64<--, 2.65<--, 2.78<--, 2.22<--, 2.50<--  LIVER FUNCTIONS - ( 2023 07:04 )  Alb: 2.9 g/dL / Pro: 9.0 g/dL / ALK PHOS: 77 U/L / ALT: 10 U/L / AST: 18 U/L / GGT: x           PT/INR - ( 2023 07:04 )   PT: 14.9 sec;   INR: 1.28 ratio         PTT - ( 2023 07:04 )  PTT:35.9 sec  CARDIAC MARKERS ( 2023 20:13 )  x     / x     / 103 U/L / x     / 13.4 ng/mL      Urinalysis Basic - ( 2023 11:15 )    Color: Light Yellow / Appearance: Clear / S.011 / pH: x  Gluc: x / Ketone: Negative  / Bili: Negative / Urobili: <2 mg/dL   Blood: x / Protein: 30 mg/dL / Nitrite: Negative   Leuk Esterase: Large / RBC: 5 /HPF / WBC 46 /HPF   Sq Epi: x / Non Sq Epi: 2 /HPF / Bacteria: Negative     Jeff Calderon  PGY-1 Resident Physician   Pager [phone]    Patient is a 83y old  Male who presents with a chief complaint of CHF exacerbation (2023 05:01)      SUBJECTIVE / OVERNIGHT EVENTS:  Patient seen and evaluated at bedside.  This AM, patient initially seen with BiPAP. Reports that he is feeling well. Transitioned off of BiPAP to nonrebreather to 4L with 100% O2 maintained during conversation.   Spoke with son on phone at bedside who was inquiring why patient was not being fed and explained that while patients are on BiPAP, they are not allowed to eat due to risk of aspiration which is what his father came into the hospital with.   Son also asking why IVF were not started if NPO and explained that since the patient has an EF 25% and being treated for volume overload, giving fluids is not the best course of action.   Son states that he is a physician.    Vital Signs Last 24 Hrs  T(C): 37 (2023 07:19), Max: 37.1 (2023 10:54)  T(F): 98.6 (2023 07:19), Max: 98.8 (2023 10:54)  HR: 85 (2023 07:19) (77 - 130)  BP: 103/63 (2023 07:19) (91/46 - 115/73)  BP(mean): 72 (2023 16:51) (72 - 72)  RR: 15 (2023 07:19) (15 - 36)  SpO2: 100% (2023 07:19) (93% - 100%)    Parameters below as of 2023 07:19  Patient On (Oxygen Delivery Method): BiPAP/CPAP        PHYSICAL EXAM:  GENERAL: tired appearing and laying in bed  CHEST/LUNG: crackles noted in his bilateral lung bases, no wheezes noted on exam.  HEART: Regular rate and rhythm; Normal S1 S2, No murmurs, rubs, or gallops  ABDOMEN: Soft, Nontender, Nondistended; Bowel sounds present  EXTREMITIES:  2+ Peripheral Pulses, No clubbing, cyanosis, or edema  PSYCH: AAOx3    LABS:                        10.3   15.89 )-----------( 222      ( 2023 07:04 )             33.7     Hgb Trend: 10.3<--, 10.5<--  02-13    140  |  99  |  80<H>  ----------------------------<  222<H>  4.5   |  26  |  2.64<H>    Ca    8.9      2023 07:04  Phos  5.4     13  Mg     2.60         TPro  9.0<H>  /  Alb  2.9<L>  /  TBili  0.8  /  DBili  x   /  AST  18  /  ALT  10  /  AlkPhos  77  13    Creatinine Trend: 2.64<--, 2.65<--, 2.78<--, 2.22<--, 2.50<--  LIVER FUNCTIONS - ( 2023 07:04 )  Alb: 2.9 g/dL / Pro: 9.0 g/dL / ALK PHOS: 77 U/L / ALT: 10 U/L / AST: 18 U/L / GGT: x           PT/INR - ( 2023 07:04 )   PT: 14.9 sec;   INR: 1.28 ratio         PTT - ( 2023 07:04 )  PTT:35.9 sec  CARDIAC MARKERS ( 2023 20:13 )  x     / x     / 103 U/L / x     / 13.4 ng/mL      Urinalysis Basic - ( 2023 11:15 )    Color: Light Yellow / Appearance: Clear / S.011 / pH: x  Gluc: x / Ketone: Negative  / Bili: Negative / Urobili: <2 mg/dL   Blood: x / Protein: 30 mg/dL / Nitrite: Negative   Leuk Esterase: Large / RBC: 5 /HPF / WBC 46 /HPF   Sq Epi: x / Non Sq Epi: 2 /HPF / Bacteria: Negative    < from: TTE with Doppler (w/Cont) (23 @ 15:39) >  CONCLUSIONS:  1. Mitral annular calcification, otherwise normal mitral  valve. Mild mitral regurgitation.  2. Aortic valve not well visualized. Mild aortic  regurgitation.  3. Severe global left ventricular systolic dysfunction.  Estimated LVEF in the 20-25% range (by visual estimate).  Endocardial visualization enhanced with intravenous  injection of echo contrast (Definity).  No LV thrombus  seen.  4. Normal right ventricular size and function.  *** No previous Echo exam.

## 2023-02-13 NOTE — DISCHARGE NOTE PROVIDER - DISCHARGE DIET
DASH Diet/Consistent Carbohydrate Diabetic Diets/Pureed Diet DASH Diet/Consistent Carbohydrate Diabetic Diets/Pureed Diet/Moderately Thick Liquids

## 2023-02-13 NOTE — CONSULT NOTE ADULT - SUBJECTIVE AND OBJECTIVE BOX
HPI: 83M with a PMH of Ischemic Cardiomyopathy (LVEF in 2019 25%) refused Defibrillator in the past, CAD (multiple stents 5-6 last stent over 10 years ago at [facility]), T2DM, afib, CKD3, HTN, HLD , afib presenting with worsening shortness of breath. Patient was hospitalized 8 Months ago for aspiration PNA at [facility]. Since being discharged he has been struggling with aspiration at home , has trouble swallowing even puree foods and water / liquids. After he eats he will cough. Over the past few weeks he has had a few episodes of aspirating at home. He began having worsening SOB past few days. He gets sob even with walking a few steps. He does not follow w a cardiologist. He gets his meds from his PCP, he claims he is compliant with his medications. He also endorses white/clear sputum production at home. Denies recent fevers, chills, cp, abd pain, n/v, leg pain, melena, hematochezia, hematemesis.     ED: 101/57, HR 93, Afebrile, BIPAP settings 10/5, FIO2 40% pulling good TV        (12 Feb 2023 19:39)  	     Allergies    No Known Allergies    Intolerances    	  MEDICATIONS:  apixaban 2.5 milliGRAM(s) Oral two times a day  buMETAnide Injectable 1 milliGRAM(s) IV Push every 12 hours  metoprolol tartrate 12.5 milliGRAM(s) Oral two times a day    piperacillin/tazobactam IVPB.. 3.375 Gram(s) IV Intermittent every 12 hours    albuterol    90 MICROgram(s) HFA Inhaler 2 Puff(s) Inhalation every 3 hours  albuterol/ipratropium for Nebulization 3 milliLiter(s) Nebulizer every 6 hours        atorvastatin 40 milliGRAM(s) Oral at bedtime  dextrose 50% Injectable 25 Gram(s) IV Push once  dextrose 50% Injectable 12.5 Gram(s) IV Push once  dextrose 50% Injectable 25 Gram(s) IV Push once  dextrose Oral Gel 15 Gram(s) Oral once PRN  glucagon  Injectable 1 milliGRAM(s) IntraMuscular once  insulin lispro (ADMELOG) corrective regimen sliding scale   SubCutaneous three times a day before meals  insulin lispro (ADMELOG) corrective regimen sliding scale   SubCutaneous at bedtime  methylPREDNISolone sodium succinate Injectable 40 milliGRAM(s) IV Push every 12 hours    dextrose 5%. 1000 milliLiter(s) IV Continuous <Continuous>  dextrose 5%. 1000 milliLiter(s) IV Continuous <Continuous>  tamsulosin 0.8 milliGRAM(s) Oral at bedtime      PAST MEDICAL & SURGICAL HISTORY:  DM (diabetes mellitus)  Afib  HTN (hypertension)  H/O CHF      No significant past surgical history      FAMILY HISTORY:  No pertinent family history in first degree relatives      SUBSTANCE USE  Tobacco Usage:  denies  Alcohol Usage: denies  Recreational drugs: denies      REVIEW OF SYSTEMS:  CV: chest pain (-), palpitation (-), orthopnea (-), PND (-), edema (-)  PULM: SOB (-), cough (-), wheezing (-), hemoptysis (-).   CONST: fever (-), chills (-) or fatigue (-)  GI: abdominal distension (-), abdominal pain (-) , nausea/vomiting (-), hematemesis, (-), melena (-), hematochezia (-)  : dysuria (-), frequency (-), hematuria (-).   NEURO: numbness (-), weakness (-), dizziness (-)  SKIN: itching (-), rash (-)  HEENT:  visual changes (-); vertigo or throat pain (-);  neck stiffness (-)   All other review of systems is negative unless indicated above.      T(C): 36.4 (02-12-23 @ 21:29), Max: 37.1 (02-12-23 @ 10:54)  HR: 78 (02-13-23 @ 03:58) (77 - 130)  BP: 98/57 (02-13-23 @ 03:58) (91/46 - 115/73)  RR: 15 (02-12-23 @ 21:29) (15 - 36)  SpO2: 100% (02-12-23 @ 21:29) (93% - 100%)  Wt(kg): --  I&O's Summary    12 Feb 2023 07:01  -  13 Feb 2023 05:02  --------------------------------------------------------  IN: 0 mL / OUT: 60 mL / NET: -60 mL        Physical Exam:  General: NAD  Cardiovascular: Normal S1 S2, No JVD, No murmurs, No edema  Respiratory: +crackles bilaterally on BIPAP  Gastrointestinal:  Soft, Non-tender, + BS	  Skin: warm and dry, No rashes, No ecchymoses, No cyanosis	  Extremities:  No clubbing, cyanosis or edema  Vascular: Peripheral pulses palpable 2+ bilaterally    CBC Full  -  ( 12 Feb 2023 10:22 )  WBC Count : 19.74 K/uL  Hemoglobin : 10.5 g/dL  Hematocrit : 36.1 %  Platelet Count - Automated : 311 K/uL  Mean Cell Volume : 96.3 fL  Mean Cell Hemoglobin : 28.0 pg  Mean Cell Hemoglobin Concentration : 29.1 gm/dL  Auto Neutrophil # : 16.61 K/uL  Auto Lymphocyte # : 1.63 K/uL  Auto Monocyte # : 1.34 K/uL  Auto Eosinophil # : 0.01 K/uL  Auto Basophil # : 0.03 K/uL  Auto Neutrophil % : 84.0 %  Auto Lymphocyte % : 8.3 %  Auto Monocyte % : 6.8 %  Auto Eosinophil % : 0.1 %  Auto Basophil % : 0.2 %    02-12    137  |  98  |  80<H>  ----------------------------<  267<H>  4.9   |  24  |  2.65<H>  02-12    138  |  98  |  77<H>  ----------------------------<  245<H>  5.8<H>   |  22  |  2.78<H>    Ca    9.1      12 Feb 2023 20:13  Ca    9.3      12 Feb 2023 14:20  Phos  6.9     02-12  Phos  8.0     02-12  Mg     2.60     02-12  Mg     2.60     02-12    TPro  10.7<H>  /  Alb  3.4  /  TBili  0.8  /  DBili  x   /  AST  41<H>  /  ALT  14  /  AlkPhos  94  02-12    proBNP: Serum Pro-Brain Natriuretic Peptide: 30359 pg/mL (02-12 @ 10:22)    Lipid Profile:   HgA1c:   TSH:   CARDIAC MARKERS ( 12 Feb 2023 20:13 )  176 ng/L / x     / x     / 103 U/L / x     / 13.4 ng/mL  CARDIAC MARKERS ( 12 Feb 2023 10:22 )  70 ng/L / x     / x     / x     / x     / x            PREVIOUS DIAGNOSTIC TESTING:      < from: CT Chest No Cont (02.12.23 @ 14:11) >  IMPRESSION:  Emphysema with patchy bilateral lung opacities that may represent a   combination of fluid overload and infectious/inflammatory process.   Three-month follow-up chest CT recommended for complete evaluation.    < end of copied text >

## 2023-02-13 NOTE — DISCHARGE NOTE PROVIDER - NSDCMRMEDTOKEN_GEN_ALL_CORE_FT
aspirin 81 mg oral tablet: orally once a day  BUMETANIDE 1MG TAB: 1 tab(s) orally 2 times a day  Crestor 10 mg oral tablet: 1 tab(s) orally once a day  DIGOXIN 0.125MG TAB: 1 tab(s) orally every 48 hours  dutasteride 0.5 mg oral capsule: 1 cap(s) orally once a day  ELIQUIS 2.5MG TAB: 1 tab(s) orally 2 times a day  HumaLOG Mix 75/25 subcutaneous suspension: 100  subcutaneous  linagliptin 5 mg oral tablet: 1 tab(s) orally once a day  Lopressor: 25 milligram(s) orally  spironolactone 25 mg oral tablet: 1 tab(s) orally once a day  tamsulosin 0.4 mg oral capsule: 2 cap(s) orally once a day (at bedtime)  Vascepa:   Vitamin D3:    aspirin 81 mg oral tablet: orally once a day  BUMETANIDE 1MG TAB: 1 tab(s) orally 2 times a day  DIGOXIN 0.125MG TAB: 1 tab(s) orally every 48 hours  dutasteride 0.5 mg oral capsule: 1 cap(s) orally once a day  ELIQUIS 2.5MG TAB: 1 tab(s) orally 2 times a day  HumaLOG Mix 75/25 subcutaneous suspension: 5 unit(s) subcutaneous 2 times a day     OR per fingersticks   levoFLOXacin 750 mg oral tablet: 1 tab(s) orally once a day   PLEASE TAKE ON 2/19/2023  linagliptin 5 mg oral tablet: 1 tab(s) orally once a day  Lopressor: 12.5 milligram(s) orally 2 times a day  tamsulosin 0.4 mg oral capsule: 2 cap(s) orally once a day (at bedtime)  Vascepa:   Vitamin D3:

## 2023-02-14 ENCOUNTER — TRANSCRIPTION ENCOUNTER (OUTPATIENT)
Age: 83
End: 2023-02-14

## 2023-02-14 LAB
ANION GAP SERPL CALC-SCNC: 14 MMOL/L — SIGNIFICANT CHANGE UP (ref 7–14)
ANION GAP SERPL CALC-SCNC: 15 MMOL/L — HIGH (ref 7–14)
B-OH-BUTYR SERPL-SCNC: <0 MMOL/L — SIGNIFICANT CHANGE UP (ref 0–0.4)
BUN SERPL-MCNC: 100 MG/DL — HIGH (ref 7–23)
BUN SERPL-MCNC: 105 MG/DL — HIGH (ref 7–23)
CALCIUM SERPL-MCNC: 8.9 MG/DL — SIGNIFICANT CHANGE UP (ref 8.4–10.5)
CALCIUM SERPL-MCNC: 9 MG/DL — SIGNIFICANT CHANGE UP (ref 8.4–10.5)
CHLORIDE SERPL-SCNC: 95 MMOL/L — LOW (ref 98–107)
CHLORIDE SERPL-SCNC: 96 MMOL/L — LOW (ref 98–107)
CO2 SERPL-SCNC: 23 MMOL/L — SIGNIFICANT CHANGE UP (ref 22–31)
CO2 SERPL-SCNC: 24 MMOL/L — SIGNIFICANT CHANGE UP (ref 22–31)
CREAT SERPL-MCNC: 2.7 MG/DL — HIGH (ref 0.5–1.3)
CREAT SERPL-MCNC: 2.78 MG/DL — HIGH (ref 0.5–1.3)
EGFR: 22 ML/MIN/1.73M2 — LOW
EGFR: 23 ML/MIN/1.73M2 — LOW
GAS PNL BLDV: SIGNIFICANT CHANGE UP
GLUCOSE BLDC GLUCOMTR-MCNC: 268 MG/DL — HIGH (ref 70–99)
GLUCOSE BLDC GLUCOMTR-MCNC: 294 MG/DL — HIGH (ref 70–99)
GLUCOSE BLDC GLUCOMTR-MCNC: 298 MG/DL — HIGH (ref 70–99)
GLUCOSE BLDC GLUCOMTR-MCNC: 304 MG/DL — HIGH (ref 70–99)
GLUCOSE BLDC GLUCOMTR-MCNC: 334 MG/DL — HIGH (ref 70–99)
GLUCOSE BLDC GLUCOMTR-MCNC: 429 MG/DL — HIGH (ref 70–99)
GLUCOSE SERPL-MCNC: 317 MG/DL — HIGH (ref 70–99)
GLUCOSE SERPL-MCNC: 424 MG/DL — HIGH (ref 70–99)
HCT VFR BLD CALC: 30.2 % — LOW (ref 39–50)
HGB BLD-MCNC: 9.3 G/DL — LOW (ref 13–17)
LACTATE SERPL-SCNC: 1.7 MMOL/L — SIGNIFICANT CHANGE UP (ref 0.5–2)
LEGIONELLA AG UR QL: NEGATIVE — SIGNIFICANT CHANGE UP
MAGNESIUM SERPL-MCNC: 2.5 MG/DL — SIGNIFICANT CHANGE UP (ref 1.6–2.6)
MAGNESIUM SERPL-MCNC: 2.7 MG/DL — HIGH (ref 1.6–2.6)
MCHC RBC-ENTMCNC: 27.8 PG — SIGNIFICANT CHANGE UP (ref 27–34)
MCHC RBC-ENTMCNC: 30.8 GM/DL — LOW (ref 32–36)
MCV RBC AUTO: 90.1 FL — SIGNIFICANT CHANGE UP (ref 80–100)
NRBC # BLD: 0 /100 WBCS — SIGNIFICANT CHANGE UP (ref 0–0)
NRBC # FLD: 0 K/UL — SIGNIFICANT CHANGE UP (ref 0–0)
PHOSPHATE SERPL-MCNC: 3.8 MG/DL — SIGNIFICANT CHANGE UP (ref 2.5–4.5)
PHOSPHATE SERPL-MCNC: 4 MG/DL — SIGNIFICANT CHANGE UP (ref 2.5–4.5)
PLATELET # BLD AUTO: 200 K/UL — SIGNIFICANT CHANGE UP (ref 150–400)
POTASSIUM SERPL-MCNC: 4.4 MMOL/L — SIGNIFICANT CHANGE UP (ref 3.5–5.3)
POTASSIUM SERPL-MCNC: 4.6 MMOL/L — SIGNIFICANT CHANGE UP (ref 3.5–5.3)
POTASSIUM SERPL-SCNC: 4.4 MMOL/L — SIGNIFICANT CHANGE UP (ref 3.5–5.3)
POTASSIUM SERPL-SCNC: 4.6 MMOL/L — SIGNIFICANT CHANGE UP (ref 3.5–5.3)
PROCALCITONIN SERPL-MCNC: 1.29 NG/ML — HIGH (ref 0.02–0.1)
RBC # BLD: 3.35 M/UL — LOW (ref 4.2–5.8)
RBC # FLD: 13.6 % — SIGNIFICANT CHANGE UP (ref 10.3–14.5)
SODIUM SERPL-SCNC: 133 MMOL/L — LOW (ref 135–145)
SODIUM SERPL-SCNC: 134 MMOL/L — LOW (ref 135–145)
WBC # BLD: 13.48 K/UL — HIGH (ref 3.8–10.5)
WBC # FLD AUTO: 13.48 K/UL — HIGH (ref 3.8–10.5)

## 2023-02-14 PROCEDURE — 99232 SBSQ HOSP IP/OBS MODERATE 35: CPT | Mod: GC

## 2023-02-14 PROCEDURE — 99233 SBSQ HOSP IP/OBS HIGH 50: CPT | Mod: GC

## 2023-02-14 RX ORDER — INSULIN LISPRO 100/ML
8 VIAL (ML) SUBCUTANEOUS
Refills: 0 | Status: DISCONTINUED | OUTPATIENT
Start: 2023-02-14 | End: 2023-02-16

## 2023-02-14 RX ORDER — METOPROLOL TARTRATE 50 MG
25 TABLET ORAL DAILY
Refills: 0 | Status: DISCONTINUED | OUTPATIENT
Start: 2023-02-14 | End: 2023-02-14

## 2023-02-14 RX ORDER — SPIRONOLACTONE 25 MG/1
25 TABLET, FILM COATED ORAL DAILY
Refills: 0 | Status: DISCONTINUED | OUTPATIENT
Start: 2023-02-14 | End: 2023-02-18

## 2023-02-14 RX ORDER — METOPROLOL TARTRATE 50 MG
12.5 TABLET ORAL ONCE
Refills: 0 | Status: COMPLETED | OUTPATIENT
Start: 2023-02-14 | End: 2023-02-14

## 2023-02-14 RX ORDER — INSULIN LISPRO 100/ML
4 VIAL (ML) SUBCUTANEOUS ONCE
Refills: 0 | Status: COMPLETED | OUTPATIENT
Start: 2023-02-14 | End: 2023-02-14

## 2023-02-14 RX ORDER — BUMETANIDE 0.25 MG/ML
1 INJECTION INTRAMUSCULAR; INTRAVENOUS EVERY 12 HOURS
Refills: 0 | Status: DISCONTINUED | OUTPATIENT
Start: 2023-02-14 | End: 2023-02-18

## 2023-02-14 RX ORDER — INSULIN GLARGINE 100 [IU]/ML
10 INJECTION, SOLUTION SUBCUTANEOUS AT BEDTIME
Refills: 0 | Status: DISCONTINUED | OUTPATIENT
Start: 2023-02-14 | End: 2023-02-16

## 2023-02-14 RX ORDER — SPIRONOLACTONE 25 MG/1
25 TABLET, FILM COATED ORAL DAILY
Refills: 0 | Status: DISCONTINUED | OUTPATIENT
Start: 2023-02-14 | End: 2023-02-14

## 2023-02-14 RX ORDER — BUMETANIDE 0.25 MG/ML
1 INJECTION INTRAMUSCULAR; INTRAVENOUS EVERY 12 HOURS
Refills: 0 | Status: DISCONTINUED | OUTPATIENT
Start: 2023-02-14 | End: 2023-02-14

## 2023-02-14 RX ORDER — TAMSULOSIN HYDROCHLORIDE 0.4 MG/1
2 CAPSULE ORAL
Qty: 0 | Refills: 0 | DISCHARGE
Start: 2023-02-14

## 2023-02-14 RX ADMIN — APIXABAN 2.5 MILLIGRAM(S): 2.5 TABLET, FILM COATED ORAL at 06:09

## 2023-02-14 RX ADMIN — Medication 125 MICROGRAM(S): at 15:36

## 2023-02-14 RX ADMIN — Medication 8: at 11:55

## 2023-02-14 RX ADMIN — Medication 3 MILLILITER(S): at 03:42

## 2023-02-14 RX ADMIN — Medication 3 MILLILITER(S): at 10:13

## 2023-02-14 RX ADMIN — Medication 81 MILLIGRAM(S): at 11:57

## 2023-02-14 RX ADMIN — Medication 40 MILLIGRAM(S): at 06:08

## 2023-02-14 RX ADMIN — Medication 6: at 07:21

## 2023-02-14 RX ADMIN — Medication 8 UNIT(S): at 11:55

## 2023-02-14 RX ADMIN — Medication 6: at 17:22

## 2023-02-14 RX ADMIN — CEFEPIME 100 MILLIGRAM(S): 1 INJECTION, POWDER, FOR SOLUTION INTRAMUSCULAR; INTRAVENOUS at 15:35

## 2023-02-14 RX ADMIN — BUMETANIDE 2 MILLIGRAM(S): 0.25 INJECTION INTRAMUSCULAR; INTRAVENOUS at 06:06

## 2023-02-14 RX ADMIN — Medication 3 MILLILITER(S): at 15:23

## 2023-02-14 RX ADMIN — BUMETANIDE 1 MILLIGRAM(S): 0.25 INJECTION INTRAMUSCULAR; INTRAVENOUS at 17:33

## 2023-02-14 RX ADMIN — ATORVASTATIN CALCIUM 40 MILLIGRAM(S): 80 TABLET, FILM COATED ORAL at 21:57

## 2023-02-14 RX ADMIN — Medication 1: at 21:50

## 2023-02-14 RX ADMIN — TAMSULOSIN HYDROCHLORIDE 0.8 MILLIGRAM(S): 0.4 CAPSULE ORAL at 21:56

## 2023-02-14 RX ADMIN — Medication 4 UNIT(S): at 00:58

## 2023-02-14 RX ADMIN — Medication 12.5 MILLIGRAM(S): at 19:36

## 2023-02-14 RX ADMIN — Medication 12.5 MILLIGRAM(S): at 06:08

## 2023-02-14 RX ADMIN — Medication 3 MILLILITER(S): at 20:54

## 2023-02-14 RX ADMIN — INSULIN GLARGINE 10 UNIT(S): 100 INJECTION, SOLUTION SUBCUTANEOUS at 21:49

## 2023-02-14 RX ADMIN — Medication 8 UNIT(S): at 17:22

## 2023-02-14 RX ADMIN — APIXABAN 2.5 MILLIGRAM(S): 2.5 TABLET, FILM COATED ORAL at 17:33

## 2023-02-14 NOTE — SWALLOW BEDSIDE ASSESSMENT ADULT - SWALLOW EVAL: DIAGNOSIS
Patient presents with functional oral and pharyngeal stage of swallow for puree and thin liquids. Oral stage marked by adequate bolus containment, adequate bolus manipulation and adequate anterior-posterior transport with adequate oral clearance post primary swallow. Pharyngeal stage marked by observed initiation of the pharyngeal swallow trigger judged via laryngeal palpation. No overt s/sx of impaired airway protection for all trials. Solids not provided as patient declined expressing "I just ate".

## 2023-02-14 NOTE — PROGRESS NOTE ADULT - PROBLEM SELECTOR PLAN 1
Acute hypercarbic and hypoxic respiratory failure to 70s via EMS placed in BIPAP. On admission with pH 7.17 and pCO2 80s concerning for retention.   - placed on BiPAP overnight to offset hypercarbia and hypoxia with overall improvement  - weaned off BiPAP yesterday and maintaining adequate O2 sat on 4L NC  - resolution of respiratory acidosis

## 2023-02-14 NOTE — PROGRESS NOTE ADULT - SUBJECTIVE AND OBJECTIVE BOX
Patient seen and examined at bedside.    83M with a PMH of Ischemic Cardiomyopathy (LVEF in 2019 25%) refused Defibrillator in the past, CAD (multiple stents 5-6 last stent over 10 years ago at [facility]), T2DM, Bronchiectasis?, CKD3, HTN, HLD presenting with worsening shortness of breath. Cardiology consulted for elevated troponin. CT shows evidence of aspiration PNA vs pulm edema. Patient on antibiotics and diuresis    Overnight Events:     Review Of Systems: No chest pain, shortness of breath, or palpitations            Current Meds:  albuterol    90 MICROgram(s) HFA Inhaler 2 Puff(s) Inhalation every 3 hours  albuterol/ipratropium for Nebulization 3 milliLiter(s) Nebulizer every 6 hours  apixaban 2.5 milliGRAM(s) Oral two times a day  aspirin  chewable 81 milliGRAM(s) Oral daily  atorvastatin 40 milliGRAM(s) Oral at bedtime  buMETAnide Injectable 2 milliGRAM(s) IV Push every 12 hours  cefepime   IVPB 1000 milliGRAM(s) IV Intermittent every 24 hours  dextrose 5%. 1000 milliLiter(s) IV Continuous <Continuous>  dextrose 5%. 1000 milliLiter(s) IV Continuous <Continuous>  dextrose 50% Injectable 25 Gram(s) IV Push once  dextrose 50% Injectable 12.5 Gram(s) IV Push once  dextrose 50% Injectable 25 Gram(s) IV Push once  dextrose Oral Gel 15 Gram(s) Oral once PRN  digoxin     Tablet 125 MICROGram(s) Oral every other day  glucagon  Injectable 1 milliGRAM(s) IntraMuscular once  insulin glargine Injectable (LANTUS) 4 Unit(s) SubCutaneous at bedtime  insulin lispro (ADMELOG) corrective regimen sliding scale   SubCutaneous three times a day before meals  insulin lispro (ADMELOG) corrective regimen sliding scale   SubCutaneous at bedtime  insulin lispro Injectable (ADMELOG) 4 Unit(s) SubCutaneous three times a day before meals  metoprolol tartrate 12.5 milliGRAM(s) Oral two times a day  predniSONE   Tablet 40 milliGRAM(s) Oral daily  tamsulosin 0.8 milliGRAM(s) Oral at bedtime      Vitals:  T(F): 97.9 (02-14), Max: 98.6 (02-13)  HR: 99 (02-14) (80 - 99)  BP: 100/53 (02-14) (93/57 - 108/64)  RR: 19 (02-14)  SpO2: 96% (02-14)  I&O's Summary    12 Feb 2023 07:01  -  13 Feb 2023 07:00  --------------------------------------------------------  IN: 0 mL / OUT: 710 mL / NET: -710 mL    13 Feb 2023 07:01  -  14 Feb 2023 06:50  --------------------------------------------------------  IN: 450 mL / OUT: 1200 mL / NET: -750 mL        Physical Exam:  General: NAD  Cardiovascular: Normal S1 S2, No JVD, No murmurs, No edema  Respiratory: Ronchi and mild crackles  Gastrointestinal:  Soft, Non-tender, + BS	  Skin: warm and dry, No rashes, No ecchymoses, No cyanosis	  Extremities:  No clubbing, cyanosis or edema  Vascular: Peripheral pulses palpable 2+ bilaterally                          9.3    13.48 )-----------( 200      ( 14 Feb 2023 04:20 )             30.2     02-14    133<L>  |  95<L>  |  105<H>  ----------------------------<  317<H>  4.4   |  24  |  2.70<H>    Ca    9.0      14 Feb 2023 04:20  Phos  4.0     02-14  Mg     2.70     02-14    TPro  9.0<H>  /  Alb  2.9<L>  /  TBili  0.8  /  DBili  x   /  AST  18  /  ALT  10  /  AlkPhos  77  02-13    PT/INR - ( 13 Feb 2023 07:04 )   PT: 14.9 sec;   INR: 1.28 ratio         PTT - ( 13 Feb 2023 07:04 )  PTT:35.9 sec  CARDIAC MARKERS ( 13 Feb 2023 06:30 )  217 ng/L / x     / x     / 67 U/L / x     / 8.8 ng/mL  CARDIAC MARKERS ( 12 Feb 2023 20:13 )  176 ng/L / x     / x     / 103 U/L / x     / 13.4 ng/mL  CARDIAC MARKERS ( 12 Feb 2023 10:22 )  70 ng/L / x     / x     / x     / x     / x          Serum Pro-Brain Natriuretic Peptide: 89653 pg/mL (02-12 @ 10:22)          New ECG(s): Personally reviewed    Echo:    Study Date: 2/13/2023  [facility]  Study quality: Technically Difficult  Referring Physician: Terri Brand MD  Blood Pressure: 99/51 mmHg  Height: 170 cm  Weight: 59 kg  BSA: 1.7 m2  ------------------------------------------------------------------------  PROCEDURE: Transthoracic echocardiogram with 2-D, M-Mode  and complete spectral and color flow Doppler.  Intravenous ultrasound enhancing agent was administered for  improved left ventricular endocardial border definition.  Following the intravenous injection of ultrasound enhancing  agent, harmonic imaging was performed.  INDICATION: Heart failure, unspecified (I50.9)  ------------------------------------------------------------------------  OBSERVATIONS:  Mitral Valve: Mitral annular calcification, otherwise  normal mitral valve. Mild mitral regurgitation.  Aortic Root: Aortic root not well visualized.  Aortic Valve: Aortic valve not well visualized. Mild aortic  regurgitation.  Left Atrium: Normal left atrium.  LA volume index = 24  cc/m2.  Left Ventricle: Severe global left ventricular systolic  dysfunction.  Estimated LVEF in the 20-25% range (by visual  estimate).  Endocardial visualization enhanced with  intravenous injection of echo contrast (Definity).  No LV  thrombus seen.  Right Heart: Normal right atrium. Normal right ventricular  size and function. Normal tricuspid valve. Minimal  tricuspid regurgitation. Pulmonic valve not well  visualized.  Pericardium/PleuraNormal pericardium with no pericardial  effusion.  ------------------------------------------------------------------------  CONCLUSIONS:  1. Mitral annular calcification, otherwise normal mitral  valve. Mild mitral regurgitation.  2. Aortic valve not well visualized. Mild aortic  regurgitation.  3. Severe global left ventricular systolic dysfunction.  Estimated LVEF in the 20-25% range (by visual estimate).  Endocardial visualization enhanced with intravenous  injection of echo contrast (Definity).  No LV thrombus  seen.  4. Normal right ventricular size and function.  *** No previous Echo exam.  ------------------------------------------------------------------------  Confirmed on  2/13/2023 - 16:54:20 by Otto Oliveros M.D.,  [facility], [facility]  ------------------------------------------------------------------------

## 2023-02-14 NOTE — PROGRESS NOTE ADULT - PROBLEM SELECTOR PLAN 5
On admission with elevated troponin of 70 but without chest pain. With increase in trops but peaked and downtrending at 215. Etiology likely demand ischemia secondary to afib RVR, CHF exacerbation and copd exacerbation.   - low suspicion for ACS   - TTE pending

## 2023-02-14 NOTE — DIETITIAN INITIAL EVALUATION ADULT - PERTINENT MEDS FT
MEDICATIONS  (STANDING):  albuterol    90 MICROgram(s) HFA Inhaler 2 Puff(s) Inhalation every 3 hours  albuterol/ipratropium for Nebulization 3 milliLiter(s) Nebulizer every 6 hours  apixaban 2.5 milliGRAM(s) Oral two times a day  atorvastatin 40 milliGRAM(s) Oral at bedtime  buMETAnide 1 milliGRAM(s) Oral every 12 hours  cefepime   IVPB 1000 milliGRAM(s) IV Intermittent every 24 hours  dextrose 5%. 1000 milliLiter(s) (100 mL/Hr) IV Continuous <Continuous>  dextrose 5%. 1000 milliLiter(s) (50 mL/Hr) IV Continuous <Continuous>  dextrose 50% Injectable 25 Gram(s) IV Push once  dextrose 50% Injectable 12.5 Gram(s) IV Push once  dextrose 50% Injectable 25 Gram(s) IV Push once  digoxin     Tablet 125 MICROGram(s) Oral every other day  glucagon  Injectable 1 milliGRAM(s) IntraMuscular once  insulin glargine Injectable (LANTUS) 10 Unit(s) SubCutaneous at bedtime  insulin lispro (ADMELOG) corrective regimen sliding scale   SubCutaneous three times a day before meals  insulin lispro (ADMELOG) corrective regimen sliding scale   SubCutaneous at bedtime  insulin lispro Injectable (ADMELOG) 8 Unit(s) SubCutaneous three times a day before meals  metoprolol succinate ER 25 milliGRAM(s) Oral daily  spironolactone 25 milliGRAM(s) Oral daily  tamsulosin 0.8 milliGRAM(s) Oral at bedtime    MEDICATIONS  (PRN):  dextrose Oral Gel 15 Gram(s) Oral once PRN Blood Glucose LESS THAN 70 milliGRAM(s)/deciliter

## 2023-02-14 NOTE — SWALLOW BEDSIDE ASSESSMENT ADULT - ASR SWALLOW RECOMMEND DIAG
Cinesophagram to objectively assess oral and pharyngeal stage of swallow given concern for aspiration PNA/to rule out silent aspiration +recent chest CT imaging/VFSS/MBS

## 2023-02-14 NOTE — PROGRESS NOTE ADULT - PROBLEM SELECTOR PLAN 8
Initially reported that patient takes Humalog 25U BID at home however, per family insulin regimen is not steady.  - lantus 10U and admelog 8U premeal with SSI  - will require daily titration of insulin regimen  - CC diet

## 2023-02-14 NOTE — SWALLOW BEDSIDE ASSESSMENT ADULT - ADDITIONAL RECOMMENDATIONS
1. This department to follow up as schedule permits for diet tolerance  2. medical team advised to reconsult this department if there is a change in medical status/ability to tolerate recommended PO diet.

## 2023-02-14 NOTE — DIETITIAN INITIAL EVALUATION ADULT - PERTINENT LABORATORY DATA
02-14    133<L>  |  95<L>  |  105<H>  ----------------------------<  317<H>  4.4   |  24  |  2.70<H>    Ca    9.0      14 Feb 2023 04:20  Phos  4.0     02-14  Mg     2.70     02-14    TPro  9.0<H>  /  Alb  2.9<L>  /  TBili  0.8  /  DBili  x   /  AST  18  /  ALT  10  /  AlkPhos  77  02-13  POCT Blood Glucose.: 304 mg/dL (02-14-23 @ 11:11)  A1C with Estimated Average Glucose Result: 7.4 % (02-13-23 @ 07:04)  A1C with Estimated Average Glucose Result: 7.7 % (02-12-23 @ 19:50)

## 2023-02-14 NOTE — DIETITIAN INITIAL EVALUATION ADULT - ADD RECOMMEND
1. Encourage PO intake and honor food preferences as able.   2. Continue to document PO in RN flow sheets and monitor weekly weights.

## 2023-02-14 NOTE — DIETITIAN INITIAL EVALUATION ADULT - NS FNS DIET ORDER
Diet, Pureed:   Consistent Carbohydrate {No Snacks} (CSTCHO)  DASH/TLC {Sodium & Cholesterol Restricted} (DASH)  Vandana (02-13-23 @ 17:56)

## 2023-02-14 NOTE — DIETITIAN INITIAL EVALUATION ADULT - REASON FOR ADMISSION
Heart failure  83M with a PMH of Ischemic Cardiomyopathy (LVEF in 2019 25%) refused Defibrillator in the past, CAD (multiple stents 5-6 last stent over 10 years ago at [facility]), T2DM, afib, CKD3, HTN, HLD , afib presenting with AHRF iso CHF exacerbation with sepsis likely 2/2 aspiration PNA and ZACHARY. s/p BiPAP treatment and now on NC with adequate oxygenation and improvement in hypercarbia. Pending formal S&S eval for concern for aspiration.

## 2023-02-14 NOTE — PROGRESS NOTE ADULT - PROBLEM SELECTOR PLAN 4
Having aspirations at home and was hospitalized 8 months ago for ASP PNA. CT chest with bilateral opacities c/f PNA with leukocytosis 19K   - s/p cefepime, flagyll and vancomycin in ED  - will continue with cefepime for broad coverage  - f/u sputum culture if patient is able to induce sputum  - MRSA swab   - aspiration precautions  - pending formal S&S eval

## 2023-02-14 NOTE — PROGRESS NOTE ADULT - SUBJECTIVE AND OBJECTIVE BOX
Jeff Calderon  PGY-1 Resident Physician   Pager [phone]/ [phone]    Patient is a 83y old  Male who presents with a chief complaint of CHF exacerbation (2023 06:49)      SUBJECTIVE / OVERNIGHT EVENTS:  Patient seen and evaluated at bedside.    Denies any fevers, chills, CP, or SOB.    Vital Signs Last 24 Hrs  T(C): 36.6 (2023 01:50), Max: 37 (2023 07:19)  T(F): 97.9 (2023 01:50), Max: 98.6 (2023 07:19)  HR: 99 (2023 06:46) (80 - 99)  BP: 100/53 (2023 01:50) (93/57 - 108/64)  BP(mean): --  RR: 19 (2023 01:50) (15 - 22)  SpO2: 96% (2023 06:46) (96% - 100%)    Parameters below as of 2023 01:50  Patient On (Oxygen Delivery Method): nasal cannula  O2 Flow (L/min): 4      PHYSICAL EXAM:  GENERAL: tired appearing and laying in bed  CHEST/LUNG: crackles noted in his bilateral lung bases, no wheezes noted on exam.  HEART: Regular rate and rhythm; Normal S1 S2, No murmurs, rubs, or gallops  ABDOMEN: Soft, Nontender, Nondistended; Bowel sounds present  EXTREMITIES:  2+ Peripheral Pulses, No clubbing, cyanosis, or edema  PSYCH: AAOx3    LABS:                        9.3    13.48 )-----------( 200      ( 2023 04:20 )             30.2     Hgb Trend: 9.3<--, 10.3<--, 10.5<--  02-14    133<L>  |  95<L>  |  105<H>  ----------------------------<  317<H>  4.4   |  24  |  2.70<H>    Ca    9.0      2023 04:20  Phos  4.0     02-14  Mg     2.70     02-14    TPro  9.0<H>  /  Alb  2.9<L>  /  TBili  0.8  /  DBili  x   /  AST  18  /  ALT  10  /  AlkPhos  77  02-13    Creatinine Trend: 2.70<--, 2.78<--, 2.64<--, 2.65<--, 2.78<--, 2.22<--  LIVER FUNCTIONS - ( 2023 07:04 )  Alb: 2.9 g/dL / Pro: 9.0 g/dL / ALK PHOS: 77 U/L / ALT: 10 U/L / AST: 18 U/L / GGT: x           PT/INR - ( 2023 07:04 )   PT: 14.9 sec;   INR: 1.28 ratio         PTT - ( 2023 07:04 )  PTT:35.9 sec  CARDIAC MARKERS ( 2023 06:30 )  x     / x     / 67 U/L / x     / 8.8 ng/mL  CARDIAC MARKERS ( 2023 20:13 )  x     / x     / 103 U/L / x     / 13.4 ng/mL      Urinalysis Basic - ( 2023 11:15 )    Color: Light Yellow / Appearance: Clear / S.011 / pH: x  Gluc: x / Ketone: Negative  / Bili: Negative / Urobili: <2 mg/dL   Blood: x / Protein: 30 mg/dL / Nitrite: Negative   Leuk Esterase: Large / RBC: 5 /HPF / WBC 46 /HPF   Sq Epi: x / Non Sq Epi: 2 /HPF / Bacteria: Negative     Jeff Calderon  PGY-1 Resident Physician   Pager [phone]/ [phone]    Patient is a 83y old  Male who presents with a chief complaint of CHF exacerbation (2023 06:49)      SUBJECTIVE / OVERNIGHT EVENTS:  Patient seen and evaluated at bedside.  This AM, patients states that he is feeling better. States that his shortness of breath is improved since admission. Not having any chest pain, fevers, or chills.  States that he normally uses 2L O2 at home which was prescribed by his pulmonologist in [place].  Feels strongly against having a tube placed for ventilation if he is unable to breathe on his own. However states he would like his son to come before signing MOLST.   Otherwise, no concerns.     Vital Signs Last 24 Hrs  T(C): 36.6 (2023 01:50), Max: 37 (2023 07:19)  T(F): 97.9 (2023 01:50), Max: 98.6 (2023 07:19)  HR: 99 (2023 06:46) (80 - 99)  BP: 100/53 (2023 01:50) (93/57 - 108/64)  BP(mean): --  RR: 19 (2023 01:50) (15 - 22)  SpO2: 96% (2023 06:46) (96% - 100%)    Parameters below as of 2023 01:50  Patient On (Oxygen Delivery Method): nasal cannula  O2 Flow (L/min): 4      PHYSICAL EXAM:  GENERAL: laying in bed, looks improved from yesterday   CHEST/LUNG: crackles noted in his bilateral lung bases, no wheezes noted on exam.  HEART: Regular rate and rhythm; Normal S1 S2, No murmurs, rubs, or gallops  ABDOMEN: Soft, Nontender, Nondistended; Bowel sounds present  EXTREMITIES:  2+ Peripheral Pulses, No clubbing, cyanosis, or edema  PSYCH: AAOx3    LABS:                        9.3    13.48 )-----------( 200      ( 2023 04:20 )             30.2     Hgb Trend: 9.3<--, 10.3<--, 10.5<--  02-14    133<L>  |  95<L>  |  105<H>  ----------------------------<  317<H>  4.4   |  24  |  2.70<H>    Ca    9.0      2023 04:20  Phos  4.0     -14  Mg     2.70     -    TPro  9.0<H>  /  Alb  2.9<L>  /  TBili  0.8  /  DBili  x   /  AST  18  /  ALT  10  /  AlkPhos  77  -13    Creatinine Trend: 2.70<--, 2.78<--, 2.64<--, 2.65<--, 2.78<--, 2.22<--  LIVER FUNCTIONS - ( 2023 07:04 )  Alb: 2.9 g/dL / Pro: 9.0 g/dL / ALK PHOS: 77 U/L / ALT: 10 U/L / AST: 18 U/L / GGT: x           PT/INR - ( 2023 07:04 )   PT: 14.9 sec;   INR: 1.28 ratio         PTT - ( 2023 07:04 )  PTT:35.9 sec  CARDIAC MARKERS ( 2023 06:30 )  x     / x     / 67 U/L / x     / 8.8 ng/mL  CARDIAC MARKERS ( 2023 20:13 )  x     / x     / 103 U/L / x     / 13.4 ng/mL      Urinalysis Basic - ( 2023 11:15 )    Color: Light Yellow / Appearance: Clear / S.011 / pH: x  Gluc: x / Ketone: Negative  / Bili: Negative / Urobili: <2 mg/dL   Blood: x / Protein: 30 mg/dL / Nitrite: Negative   Leuk Esterase: Large / RBC: 5 /HPF / WBC 46 /HPF   Sq Epi: x / Non Sq Epi: 2 /HPF / Bacteria: Negative

## 2023-02-14 NOTE — DIETITIAN INITIAL EVALUATION ADULT - OTHER INFO
A&Ox4; Ecuadorean speaking. Son at bedside provided collateral. Pt was NPO yesterday. Pureed diet started today. SLP recommended cine for objective swallow assessment. No reported GI issues (nausea/vomiting/diarrhea/constipation.) NKFA. Kosher. Amenable to Glucerna. Noted hyperglycemia likely from prednisone; son believes it is from sweets. A1c 7.4 WNL. No recent HIE wt data. Son states pt weighs 115 pounds; however, dosing wt 130.5 pounds.

## 2023-02-14 NOTE — SWALLOW BEDSIDE ASSESSMENT ADULT - COMMENTS
Per Internal Medicine Note 2/14/23: "83M with a PMH of Ischemic Cardiomyopathy (LVEF in 2019 25%) refused Defibrillator in the past, CAD (multiple stents 5-6 last stent over 10 years ago at [facility]), T2DM, afib, CKD3, HTN, HLD , afib presenting with AHRF iso CHF exacerbation with sepsis likely 2/2 aspiration PNA and ZACHARY. s/p BiPAP treatment and now on NC with adequate oxygenation and improvement in hypercarbia. Pending formal S&S eval for concern for aspiration."    CT Chest 2/12/23: "Emphysema with patchy bilateral lung opacities that may represent a combination of fluid overload and infectious/inflammatory process."     Patient received upright in bed, awake, alert and communicative with supplemental oxygen in place via nasal canula. Patient's son present at bedside who served as a reliable informant. Language Line [place]ian  offered, however patient declined with preference for son to provide translation. Per patient's son, patient with history of coughing during meals.

## 2023-02-14 NOTE — PROGRESS NOTE ADULT - PROBLEM SELECTOR PLAN 6
EKG on admission shows afib RVR.  - c/w eliquis  - will resume digoxin 125 mcg q48 hours  - metoprolol succinate 25 QD

## 2023-02-14 NOTE — PROGRESS NOTE ADULT - PROBLEM SELECTOR PLAN 3
Patient with history of COPD with reported wheezing on admission.   - s/p solumedrol 40mg Q12hr yesterday  - will continue with prednisone 40 QD x 4 additional days   - duonebs q6 hours  - incentive spirometer

## 2023-02-14 NOTE — PROGRESS NOTE ADULT - PROBLEM SELECTOR PLAN 7
Presenting with Cr: 2.78 with slight hyperkalemia 5.8 on admission. Baseline SCr 2.2 in 10/2022. Presentation appears to be ZACHARY on CKD  - butcher placed in ED for UOP monitoring  - pending urine lytes   - Strict I&O

## 2023-02-14 NOTE — PROGRESS NOTE ADULT - PROBLEM SELECTOR PLAN 2
Presenting with bilateral pleural effusions with BNP 11K iso of PNA. Unclear if compliant on medications in the outpatient setting. TTE 2019 LVEF 25% refusing defibrillator  - s/p Bumex 2 x1 in ED   - c/w bumex 2mg BID cait iso of decreased urine output   - s/p BIPAP 10/5 with FIO2 40% now transitioned to NC   - Strict I&Os and daily weights   - TTE to evaluate for if worsening heart failure  - will switch metoprolol tartrate to metoprolol succinate 25 QD Presenting with bilateral pleural effusions with BNP 11K iso of PNA. Unclear if compliant on medications in the outpatient setting. TTE 2019 LVEF 25% refusing defibrillator.  - c/w bumex 2mg BID cait iso of decreased urine output   - s/p BIPAP 10/5 with FIO2 40% now transitioned to NC   - Strict I&Os and daily weights   - TTE 2/13 with EF 20-25%   - will switch metoprolol tartrate to metoprolol succinate 25 QD

## 2023-02-14 NOTE — PROGRESS NOTE ADULT - ASSESSMENT
83M with a PMH of Ischemic Cardiomyopathy (LVEF in 2019 25%) refused Defibrillator in the past, CAD (multiple stents 5-6 last stent over 10 years ago at [facility]), T2DM, Bronchiectasis?, CKD3, HTN, HLD presenting with worsening shortness of breath. Cardiology consulted for elevated troponin    1. Hypoxic/Hypercarbic Respiratory Failure - likely in the setting of acute heart failure exacerbation also concerned for underlying aspiration? 8 months ago admitted to [facility] for aspiration PNA and has been coughing on water  2. Acute on Chronic Heart Failure - TTE 2019 LVEF 25% refusing defibrillator on Bumex 1mg PO Q12H  3. ZACHARY on CKD  4. Elevated Troponin      #Elevated Troponin  - Patient with elevated CE  - Troponinemia most likely in the setting of demand ischemia 2/2 to ZACHARY and HF  - EKG shows no ischemic changes (Afib with known RBBB)  - Continuous cardiac monitoring to monitor for arrhythmias  - CBC, CMP, coags, HbA1C, TSH, lipids for comorbidities, Trended to peak no concerns for ACS at this time.   -Continue atorvastatin 40mg daily  -Switch metoprolol tart to metop succinate 25mg daily    #HFrEF (EF 25%)  Patient's shortness of breath is most likely due to aspiration as the patient does not appear to be significantly fluid overloaded on exam. TTE shows evidence of low EF which is suspected in becky patient with previous ICM.   - ECHO obtained, results noted  - Transition bumex IV to home bumex 1mg PO BID  - Fluid restrict daily weights strict I/Os goal net negative 1-2L  -Keep net negative 0.5L a day  -Switch metoprolol tart to metop succinate 25mg daily  -Unable to start ACE/ARB/ARni in the setting of elevated creatinine    #Atrial fibrillation  -Continue apixaban  -Continue metoprolol as above    Chivo Weinstein MD  Cardiology fellow

## 2023-02-14 NOTE — PROGRESS NOTE ADULT - ASSESSMENT
83M with a PMH of Ischemic Cardiomyopathy (LVEF in 2019 25%) refused Defibrillator in the past, CAD (multiple stents 5-6 last stent over 10 years ago at [facility]), T2DM, afib, CKD3, HTN, HLD , afib presenting with AHRF iso CHF exacerbation with sepsis likely 2/2 aspiration PNA and ZACHARY. s/p BiPAP treatment and now on NC with adequate oxygenation and improvement in hypercarbia. Pending formal S&S eval for concern for aspiration.

## 2023-02-15 LAB
-  AMIKACIN: SIGNIFICANT CHANGE UP
-  AZTREONAM: SIGNIFICANT CHANGE UP
-  CEFEPIME: SIGNIFICANT CHANGE UP
-  CEFTAZIDIME: SIGNIFICANT CHANGE UP
-  CIPROFLOXACIN: SIGNIFICANT CHANGE UP
-  GENTAMICIN: SIGNIFICANT CHANGE UP
-  IMIPENEM: SIGNIFICANT CHANGE UP
-  LEVOFLOXACIN: SIGNIFICANT CHANGE UP
-  MEROPENEM: SIGNIFICANT CHANGE UP
-  PIPERACILLIN/TAZOBACTAM: SIGNIFICANT CHANGE UP
-  TOBRAMYCIN: SIGNIFICANT CHANGE UP
ANION GAP SERPL CALC-SCNC: 14 MMOL/L — SIGNIFICANT CHANGE UP (ref 7–14)
BUN SERPL-MCNC: 107 MG/DL — HIGH (ref 7–23)
CALCIUM SERPL-MCNC: 8.7 MG/DL — SIGNIFICANT CHANGE UP (ref 8.4–10.5)
CHLORIDE SERPL-SCNC: 96 MMOL/L — LOW (ref 98–107)
CO2 SERPL-SCNC: 25 MMOL/L — SIGNIFICANT CHANGE UP (ref 22–31)
CREAT SERPL-MCNC: 2.47 MG/DL — HIGH (ref 0.5–1.3)
CULTURE RESULTS: SIGNIFICANT CHANGE UP
EGFR: 25 ML/MIN/1.73M2 — LOW
GLUCOSE BLDC GLUCOMTR-MCNC: 118 MG/DL — HIGH (ref 70–99)
GLUCOSE BLDC GLUCOMTR-MCNC: 176 MG/DL — HIGH (ref 70–99)
GLUCOSE BLDC GLUCOMTR-MCNC: 227 MG/DL — HIGH (ref 70–99)
GLUCOSE BLDC GLUCOMTR-MCNC: 343 MG/DL — HIGH (ref 70–99)
GLUCOSE SERPL-MCNC: 331 MG/DL — HIGH (ref 70–99)
HCT VFR BLD CALC: 28.2 % — LOW (ref 39–50)
HGB BLD-MCNC: 8.7 G/DL — LOW (ref 13–17)
MAGNESIUM SERPL-MCNC: 2.4 MG/DL — SIGNIFICANT CHANGE UP (ref 1.6–2.6)
MCHC RBC-ENTMCNC: 27.8 PG — SIGNIFICANT CHANGE UP (ref 27–34)
MCHC RBC-ENTMCNC: 30.9 GM/DL — LOW (ref 32–36)
MCV RBC AUTO: 90.1 FL — SIGNIFICANT CHANGE UP (ref 80–100)
METHOD TYPE: SIGNIFICANT CHANGE UP
NRBC # BLD: 0 /100 WBCS — SIGNIFICANT CHANGE UP (ref 0–0)
NRBC # FLD: 0 K/UL — SIGNIFICANT CHANGE UP (ref 0–0)
ORGANISM # SPEC MICROSCOPIC CNT: SIGNIFICANT CHANGE UP
ORGANISM # SPEC MICROSCOPIC CNT: SIGNIFICANT CHANGE UP
PHOSPHATE SERPL-MCNC: 4.9 MG/DL — HIGH (ref 2.5–4.5)
PLATELET # BLD AUTO: 179 K/UL — SIGNIFICANT CHANGE UP (ref 150–400)
POTASSIUM SERPL-MCNC: 3.9 MMOL/L — SIGNIFICANT CHANGE UP (ref 3.5–5.3)
POTASSIUM SERPL-SCNC: 3.9 MMOL/L — SIGNIFICANT CHANGE UP (ref 3.5–5.3)
RBC # BLD: 3.13 M/UL — LOW (ref 4.2–5.8)
RBC # FLD: 13.8 % — SIGNIFICANT CHANGE UP (ref 10.3–14.5)
SODIUM SERPL-SCNC: 135 MMOL/L — SIGNIFICANT CHANGE UP (ref 135–145)
SPECIMEN SOURCE: SIGNIFICANT CHANGE UP
WBC # BLD: 12.97 K/UL — HIGH (ref 3.8–10.5)
WBC # FLD AUTO: 12.97 K/UL — HIGH (ref 3.8–10.5)

## 2023-02-15 PROCEDURE — 99232 SBSQ HOSP IP/OBS MODERATE 35: CPT | Mod: GC

## 2023-02-15 PROCEDURE — 74230 X-RAY XM SWLNG FUNCJ C+: CPT | Mod: 26

## 2023-02-15 PROCEDURE — 93308 TTE F-UP OR LMTD: CPT | Mod: 26

## 2023-02-15 RX ORDER — IPRATROPIUM/ALBUTEROL SULFATE 18-103MCG
3 AEROSOL WITH ADAPTER (GRAM) INHALATION EVERY 6 HOURS
Refills: 0 | Status: DISCONTINUED | OUTPATIENT
Start: 2023-02-15 | End: 2023-02-18

## 2023-02-15 RX ORDER — METOPROLOL TARTRATE 50 MG
25 TABLET ORAL DAILY
Refills: 0 | Status: DISCONTINUED | OUTPATIENT
Start: 2023-02-15 | End: 2023-02-15

## 2023-02-15 RX ORDER — METOPROLOL TARTRATE 50 MG
12.5 TABLET ORAL EVERY 12 HOURS
Refills: 0 | Status: DISCONTINUED | OUTPATIENT
Start: 2023-02-15 | End: 2023-02-18

## 2023-02-15 RX ORDER — METOPROLOL TARTRATE 50 MG
12.5 TABLET ORAL ONCE
Refills: 0 | Status: COMPLETED | OUTPATIENT
Start: 2023-02-15 | End: 2023-02-15

## 2023-02-15 RX ADMIN — APIXABAN 2.5 MILLIGRAM(S): 2.5 TABLET, FILM COATED ORAL at 06:04

## 2023-02-15 RX ADMIN — TAMSULOSIN HYDROCHLORIDE 0.8 MILLIGRAM(S): 0.4 CAPSULE ORAL at 21:31

## 2023-02-15 RX ADMIN — CEFEPIME 100 MILLIGRAM(S): 1 INJECTION, POWDER, FOR SOLUTION INTRAMUSCULAR; INTRAVENOUS at 14:38

## 2023-02-15 RX ADMIN — ATORVASTATIN CALCIUM 40 MILLIGRAM(S): 80 TABLET, FILM COATED ORAL at 21:31

## 2023-02-15 RX ADMIN — Medication 8 UNIT(S): at 07:36

## 2023-02-15 RX ADMIN — INSULIN GLARGINE 10 UNIT(S): 100 INJECTION, SOLUTION SUBCUTANEOUS at 21:31

## 2023-02-15 RX ADMIN — APIXABAN 2.5 MILLIGRAM(S): 2.5 TABLET, FILM COATED ORAL at 17:20

## 2023-02-15 RX ADMIN — Medication 3 MILLILITER(S): at 04:19

## 2023-02-15 RX ADMIN — Medication 4: at 11:47

## 2023-02-15 RX ADMIN — BUMETANIDE 1 MILLIGRAM(S): 0.25 INJECTION INTRAMUSCULAR; INTRAVENOUS at 06:04

## 2023-02-15 RX ADMIN — Medication 8 UNIT(S): at 11:48

## 2023-02-15 RX ADMIN — BUMETANIDE 1 MILLIGRAM(S): 0.25 INJECTION INTRAMUSCULAR; INTRAVENOUS at 17:20

## 2023-02-15 RX ADMIN — Medication 8: at 07:36

## 2023-02-15 RX ADMIN — Medication 8 UNIT(S): at 17:19

## 2023-02-15 RX ADMIN — Medication 12.5 MILLIGRAM(S): at 21:32

## 2023-02-15 NOTE — PROGRESS NOTE ADULT - PROBLEM SELECTOR PLAN 7
Presenting with Cr: 2.78 with slight hyperkalemia 5.8 on admission. Baseline SCr 2.2 in 10/2022. Presentation appears to be ZACHARY on CKD  - butcher placed in ED for UOP monitoring  - pending urine lytes   - Strict I&O Presenting with Cr: 2.78 with slight hyperkalemia 5.8 on admission. Baseline SCr 2.2 in 10/2022. Presentation appears to be ZACHARY on CKD  - Drew catheter removed  - Strict I&O

## 2023-02-15 NOTE — SWALLOW VFSS/MBS ASSESSMENT ADULT - DIAGNOSTIC IMPRESSIONS
Patient presents with Mild Oropharyngeal Dysphagia. Oral stage characterized by adequate bolus containment, prolonged bolus manipulation with repetitive lingual pumping,  mild premature spillage over the lateral surface of the epiglottis for Thin Liquids resulting in Laryngeal Penetration before the swallow without retrieval secondary to reduced tongue coordination/reduced tongue to palate seal, slowed/prolonged piecemeal transfer/swallow (~2 swallows per bolus) with adequate oral clearance. Pharyngeal stage marked by delayed initiation of the pharyngeal swallow trigger (bolus head at the lateral surface of the epiglottis for Thin Liquids), reduced laryngeal elevation with reduced laryngeal vestibule closure, reduced tongue base retraction and adequate pharyngeal constriction. There were mild pharyngeal clearance deficits located at the base of tongue and valleculae, however adequate pharyngeal clearance with spontaneous re-swallow. There was Laryngeal Penetration during the swallow with retrieval and adequate airway protection maintained for Mildly Thick Liquids. There was No Aspiration before, during or after the swallow for Mildly Thick Liquids and Puree. Patient presents with Mild Oropharyngeal Dysphagia. Oral stage characterized by adequate bolus containment, prolonged bolus manipulation with repetitive lingual pumping,  mild premature spillage over the lateral surface of the epiglottis for Thin Liquids resulting in Laryngeal Penetration before the swallow without retrieval secondary to reduced tongue coordination/reduced tongue to palate seal, slowed/prolonged piecemeal transfer/swallow (~2 swallows per bolus) with adequate oral clearance. Pharyngeal stage marked by delayed initiation of the pharyngeal swallow trigger (bolus head at the lateral surface of the epiglottis for Thin Liquids), reduced laryngeal elevation with reduced laryngeal vestibule closure, reduced tongue base retraction and reduced pharyngeal constriction. There were mild pharyngeal clearance deficits located at the base of tongue and valleculae, however adequate pharyngeal clearance with spontaneous re-swallow. There was Laryngeal Penetration during the swallow with retrieval and adequate airway protection maintained for Mildly Thick Liquids. There was No Aspiration before, during or after the swallow for Mildly Thick Liquids and Puree.

## 2023-02-15 NOTE — PHYSICAL THERAPY INITIAL EVALUATION ADULT - ADDITIONAL COMMENTS
Patient lives with wife in apartment, no steps to negotiate. patient was getting assist with ADL prior to admission. Patient has an aide 12 hour/7days week. patient was using a walker prior to admission.

## 2023-02-15 NOTE — SWALLOW VFSS/MBS ASSESSMENT ADULT - ADDITIONAL RECOMMENDATIONS
*delayed entry d/t pt care*  Pt admitted on  for IOL d/t FGR dx in 40th week of pgy.  COVID + on . S/P cervidil, cervical dilator balloon AROM, epidural placement, and C/S delivery at 0733. Pt. comfortable following  and recovery period. Bleeding minimal, fundus midline and firm, vital signs WNL (see flowsheets). No clots observed with lochia. Pt  briefly with good latch during recovery, skin to skin done with mother and father. Pt reporting no pain during recovery, educated regarding post op pain following anesthesia/duramorph wearing off. Report given to ESTELA Suárez prior to transfer. Patient transferred in cart with baby in arms and FOB alongside. PIV infusing LR and PP oxytocin per orders. Christian in place, draining dark yellow urine w/450cc output in recovery.    ESTELA Suárez present in PP room 2313 to receive and assess couplet. Baby pink in color, in no apparent distress at time of transfer. ID bands verified x2 with parents and PP RN, HUGS tag applied.     *see FHR tracing, flowsheets, delivery summary, resident delivery note, infant EPIC chart for further information*   Problem: Risk for Maternal Complications  Goal: Remains free of signs and symptoms of infection  Outcome: Outcome Not Met, Continue to Monitor  Note: Pt has covid19- symptomatic w/productive cough and sore throat. No fevers during admission, no other s/s of chorioamnionitis.  Goal: Physical assessment maintained within expected parameters  Outcome: Outcome Not Met, Plan Adjusted  Note: Per report pt had 1 elevated blood pressure immediately after being told she would need c section. Per anesthesia pt had sustained pressures in the 150s for over 30 mins-per H Rasool 5mg labetalol given intraoperatively. Writer RN suggested changing pt cuff size, per anesthesia they had no other sizes available. Writer RN changed cuff in recovery following c section, all pressures 106-128/58-74. PreE labs sent as ordered, pt dx w  preE w/o SF per OB team given elevated PCR  Goal: Remains free from falls  Outcome: Outcome Met, Continue evaluating goal progress toward completion  Note: Pt asked RN \"can I get up?\" In recovery room following c section. RN educated pt regarding expectations for ambulation following c/section and anesthesia. Pt expressed understanding. RN reiterated need to press call light prior to attempting getting up from bed.      Problem: Moderate/High Risk for Postpartum Hemorrhage (PPH)  Goal: Hemodynamic stability is maintained  Outcome: Outcome Met, Continue evaluating goal progress toward completion  Flowsheets  Taken 8/6/2022 1200 by Lisa Suárez RN  Fundus: WDL  Uterine Tone: Firm  Uterine Involution: 2 below umbilicus  Lochia: WDL  Lochia Amount Estimate: Scant  Taken 8/6/2022 1045 by Amy Broussard RN  Lochia Amount Measured (mLs): 5 mL  Clots: (none) --  Taken 8/6/2022 1030 by Amy Broussard RN  BP: 106/64  Pulse: 78  SpO2: 98 %  Taken 8/6/2022 1015 by Amy Broussard RN  Resp: 16  Temp: 98.2 °F (36.8 °C)  Note: Hemmorhage kit at bedside, TXA given by anesthesia prophylactically, hemabate given post placental delivery per OB team orders. QBL done in OR and recovery.      1. This department to follow up as schedule permits for diet tolerance  2. Medical team advised to reconsult this department if there is a change in medical status/ability to tolerate recommended PO diet. 1. This department to follow up as schedule permits for diet tolerance/swallow therapy  2. Medical team advised to reconsult this department if there is a change in medical status/ability to tolerate recommended PO diet.  3. Patient may benefit swallow therapy pending discharge plans (rehab vs homecare vs outpatient) at the [facility] [phone].

## 2023-02-15 NOTE — SWALLOW VFSS/MBS ASSESSMENT ADULT - COMMENTS
Per Internal Medicine Note 2/15/23: "83M with a PMH of Ischemic Cardiomyopathy (LVEF in 2019 25%) refused Defibrillator in the past, CAD (multiple stents 5-6 last stent over 10 years ago at [facility]), T2DM, afib, CKD3, HTN, HLD , afib presenting with AHRF iso CHF exacerbation with sepsis due to Pseudomonal PNA and ZACHARY. s/p BiPAP treatment and now on NC with adequate oxygenation and improvement in hypercarbia. Pending formal S&S eval for concern for aspiration."    Patient is familiar to this department as the patient was seen for an initial swallow evaluation on 2/14/23. See consult    Patient in Radiology Suite, awake, alert and communicative with supplemental oxygen in place via nasal canula. Language Line New Zealander  utilized throughout the evaluation, ID#[id number]. Per Internal Medicine Note 2/15/23: "83M with a PMH of Ischemic Cardiomyopathy (LVEF in 2019 25%) refused Defibrillator in the past, CAD (multiple stents 5-6 last stent over 10 years ago at [facility]), T2DM, afib, CKD3, HTN, HLD , afib presenting with AHRF iso CHF exacerbation with sepsis due to Pseudomonal PNA and ZACHARY. s/p BiPAP treatment and now on NC with adequate oxygenation and improvement in hypercarbia. Pending formal S&S eval for concern for aspiration."    Patient is familiar to this department as the patient was seen for an initial swallow evaluation on 2/14/23. See consult    Patient in Radiology Suite, awake, alert and communicative with supplemental oxygen in place via nasal canula. Language Line Citizen of [place]  utilized throughout the evaluation, ID#[id number]. Patient with reduced ability following multi-step commands.

## 2023-02-15 NOTE — PROGRESS NOTE ADULT - ASSESSMENT
83M with a PMH of Ischemic Cardiomyopathy (LVEF in 2019 25%) refused Defibrillator in the past, CAD (multiple stents 5-6 last stent over 10 years ago at [facility]), T2DM, Bronchiectasis?, CKD3, HTN, HLD presenting with worsening shortness of breath. Cardiology consulted for elevated troponin    1. Hypoxic/Hypercarbic Respiratory Failure - likely in the setting of acute heart failure exacerbation also concerned for underlying aspiration? 8 months ago admitted to [facility] for aspiration PNA and has been coughing on water  2. Acute on Chronic Heart Failure - TTE 2019 LVEF 25% refusing defibrillator on Bumex 1mg PO Q12H  3. ZACHARY on CKD  4. Elevated Troponin      #Elevated Troponin  - Patient with elevated CE  - Troponinemia most likely in the setting of demand ischemia potentially due to an aspiration event  - EKG shows no ischemic changes (Afib with known RBBB)  - Continuous cardiac monitoring to monitor for arrhythmias  - CBC, CMP, coags, HbA1C, TSH, lipids for comorbidities, Trended to peak no concerns for ACS at this time.   -Continue atorvastatin 40mg daily  -Continue metop succinate 25mg daily  -Will consider a pharmacological nuclear stress test     #HFrEF (EF 25%)  Patient's shortness of breath is most likely due to aspiration as the patient does not appear to be significantly fluid overloaded on exam. TTE shows evidence of low EF which is suspected in becky patient with previous ICM.   - ECHO obtained, results noted  - Transition bumex IV to home bumex 1mg PO BID  - Fluid restrict daily weights strict I/Os goal net negative 1-2L  -Keep net negative 0.5L a day  -Continue metop succinate 25mg daily  -Unable to start ACE/ARB/ARni in the setting of elevated creatinine  -Continue with aldactone 25mg daily    #Atrial fibrillation  -Continue apixaban  -Continue metoprolol as above    Chivo Weinstein MD  Cardiology fellow

## 2023-02-15 NOTE — SWALLOW VFSS/MBS ASSESSMENT ADULT - RECOMMENDED CONSISTENCY
1.  2. Aspiration/Reflux Precautions  3. Daily Oral Hygiene 1. Puree with Mildly Thick Liquids   2. Aspiration/Reflux Precautions  3. Daily Oral Hygiene

## 2023-02-15 NOTE — PROGRESS NOTE ADULT - PROBLEM SELECTOR PLAN 5
On admission with elevated troponin of 70 but without chest pain. With increase in trops but peaked and downtrending at 215. Etiology likely demand ischemia secondary to afib RVR, CHF exacerbation and copd exacerbation.   - low suspicion for ACS   - Cardiology recommendations appreciated On admission with elevated troponin of 70 but without chest pain. With increase in trops but peaked and downtrending at 215. Etiology likely demand ischemia secondary to afib RVR, CHF exacerbation and copd exacerbation (although less likely)  - low suspicion for ACS   - Cardiology recommendations appreciated

## 2023-02-15 NOTE — PROGRESS NOTE ADULT - PROBLEM SELECTOR PLAN 2
Presenting with bilateral pleural effusions with BNP 11K iso of PNA. Unclear if compliant on medications in the outpatient setting. TTE 2019 LVEF 25% refusing defibrillator.  - c/w bumex 1 mg BID cait iso of decreased urine output   - s/p BIPAP 10/5 with FIO2 40% now transitioned to NC   - Strict I&Os and daily weights   - TTE 2/13 with EF 20-25%   - Metoprolol succinate 25 QD

## 2023-02-15 NOTE — PROGRESS NOTE ADULT - SUBJECTIVE AND OBJECTIVE BOX
PATIENT: KATINA YUSUF, MRN: [id number]    CHIEF COMPLAINT: Patient is a 83y old  Male who presents with a chief complaint of Heart failure  83M with a PMH of Ischemic Cardiomyopathy (LVEF in 2019 25%) refused Defibrillator in the past, CAD (multiple stents 5-6 last stent over 10 years ago at [facility]), T2DM, afib, CKD3, HTN, HLD , afib presenting with AHRF iso CHF exacerbation with sepsis likely 2/2 aspiration PNA and ZACHARY. s/p BiPAP treatment and now on NC with adequate oxygenation and improvement in hypercarbia. Pending formal S&S eval for concern for aspiration.      (14 Feb 2023 15:27)      INTERVAL HISTORY/OVERNIGHT EVENTS: Metoprolol 12.5 mg given last night, no spironolactone taken.    REVIEW OF SYSTEMS:    Constitutional:     [ ] negative [ ] fevers [ ] chills [ ] weight loss [ ] weight gain  HEENT:                  [ ] negative [ ] dry eyes [ ] eye irritation [ ] postnasal drip [ ] nasal congestion  CV:                         [ ] negative  [ ] chest pain [ ] orthopnea [ ] palpitations [ ] murmur  Resp:                     [ ] negative [ ] cough [ ] shortness of breath [ ] dyspnea [ ] wheezing [ ] sputum [ ] hemoptysis  GI:                          [ ] negative [ ] nausea [ ] vomiting [ ] diarrhea [ ] constipation [ ] abd pain [ ] dysphagia   :                        [ ] negative [ ] dysuria [ ] nocturia [ ] hematuria [ ] increased urinary frequency  Musculoskeletal: [ ] negative [ ] back pain [ ] myalgias [ ] arthralgias [ ] fracture  Skin:                       [ ] negative [ ] rash [ ] itch  Neurological:        [ ] negative [ ] headache [ ] dizziness [ ] syncope [ ] weakness [ ] numbness  Psychiatric:           [ ] negative [ ] anxiety [ ] depression  Endocrine:            [ ] negative [ ] diabetes [ ] thyroid problem  Heme/Lymph:      [ ] negative [ ] anemia [ ] bleeding problem  Allergic/Immune: [ ] negative [ ] itchy eyes [ ] nasal discharge [ ] hives [ ] angioedema    [ ] All other systems negative  [ ] Unable to assess ROS because ________.    MEDICATIONS:  MEDICATIONS  (STANDING):  albuterol    90 MICROgram(s) HFA Inhaler 2 Puff(s) Inhalation every 3 hours  albuterol/ipratropium for Nebulization 3 milliLiter(s) Nebulizer every 6 hours  apixaban 2.5 milliGRAM(s) Oral two times a day  atorvastatin 40 milliGRAM(s) Oral at bedtime  buMETAnide 1 milliGRAM(s) Oral every 12 hours  cefepime   IVPB 1000 milliGRAM(s) IV Intermittent every 24 hours  dextrose 5%. 1000 milliLiter(s) (100 mL/Hr) IV Continuous <Continuous>  dextrose 5%. 1000 milliLiter(s) (50 mL/Hr) IV Continuous <Continuous>  dextrose 50% Injectable 25 Gram(s) IV Push once  dextrose 50% Injectable 12.5 Gram(s) IV Push once  dextrose 50% Injectable 25 Gram(s) IV Push once  digoxin     Tablet 125 MICROGram(s) Oral every other day  glucagon  Injectable 1 milliGRAM(s) IntraMuscular once  insulin glargine Injectable (LANTUS) 10 Unit(s) SubCutaneous at bedtime  insulin lispro (ADMELOG) corrective regimen sliding scale   SubCutaneous three times a day before meals  insulin lispro (ADMELOG) corrective regimen sliding scale   SubCutaneous at bedtime  insulin lispro Injectable (ADMELOG) 8 Unit(s) SubCutaneous three times a day before meals  spironolactone 25 milliGRAM(s) Oral daily  tamsulosin 0.8 milliGRAM(s) Oral at bedtime    MEDICATIONS  (PRN):  dextrose Oral Gel 15 Gram(s) Oral once PRN Blood Glucose LESS THAN 70 milliGRAM(s)/deciliter      ALLERGIES: Allergies    No Known Allergies      OBJECTIVE:  ICU Vital Signs Last 24 Hrs  T(C): 37.1 (15 Feb 2023 01:59), Max: 37.1 (15 Feb 2023 01:59)  T(F): 98.7 (15 Feb 2023 01:59), Max: 98.7 (15 Feb 2023 01:59)  HR: 90 (15 Feb 2023 03:35) (75 - 99)  BP: 98/50 (15 Feb 2023 01:59) (88/52 - 98/50)  BP(mean): --  ABP: --  ABP(mean): --  RR: 18 (15 Feb 2023 01:59) (16 - 20)  SpO2: 99% (15 Feb 2023 03:35) (96% - 100%)    O2 Parameters below as of 15 Feb 2023 01:59  Patient On (Oxygen Delivery Method): nasal cannula  O2 Flow (L/min): 2      CAPILLARY BLOOD GLUCOSE      POCT Blood Glucose.: 294 mg/dL (14 Feb 2023 21:42)  POCT Blood Glucose.: 298 mg/dL (14 Feb 2023 16:25)  POCT Blood Glucose.: 304 mg/dL (14 Feb 2023 11:11)  POCT Blood Glucose.: 268 mg/dL (14 Feb 2023 07:02)    CAPILLARY BLOOD GLUCOSE      POCT Blood Glucose.: 294 mg/dL (14 Feb 2023 21:42)    I&O's Summary    13 Feb 2023 07:01  -  14 Feb 2023 07:00  --------------------------------------------------------  IN: 450 mL / OUT: 1200 mL / NET: -750 mL    14 Feb 2023 07:01  -  15 Feb 2023 06:32  --------------------------------------------------------  IN: 610 mL / OUT: 2150 mL / NET: -1540 mL      PHYSICAL EXAMINATION:  General: Comfortable, no acute distress, cooperative with exam.  HEENT: PERRLA, EOMI, moist mucous membranes.  Respiratory: CTAB, normal respiratory effort, no coughing, wheezes, crackles, or rales.  CV: RRR, S1S2, no murmurs, rubs or gallops. No JVD. Distal pulses intact.  Abdominal: Soft, nontender, nondistended, no rebound or guarding, normal bowel sounds.  Neurology: AOx3, no focal neuro defects, URIARTE x 4.  Extremities: No pitting edema, + Peripheral pulses.  Incisions:   Tubes:    LABS:                          9.3    13.48 )-----------( 200      ( 14 Feb 2023 04:20 )             30.2     02-14    133<L>  |  95<L>  |  105<H>  ----------------------------<  317<H>  4.4   |  24  |  2.70<H>    Ca    9.0      14 Feb 2023 04:20  Phos  4.0     02-14  Mg     2.70     02-14    TPro  9.0<H>  /  Alb  2.9<L>  /  TBili  0.8  /  DBili  x   /  AST  18  /  ALT  10  /  AlkPhos  77  02-13    LIVER FUNCTIONS - ( 13 Feb 2023 07:04 )  Alb: 2.9 g/dL / Pro: 9.0 g/dL / ALK PHOS: 77 U/L / ALT: 10 U/L / AST: 18 U/L / GGT: x           PT/INR - ( 13 Feb 2023 07:04 )   PT: 14.9 sec;   INR: 1.28 ratio         PTT - ( 13 Feb 2023 07:04 )  PTT:35.9 sec    EKG: Reviewed    IMAGING: Studies reviewed   PATIENT: KATINA YUSUF, MRN: [id number]    CHIEF COMPLAINT: Patient is a 83y old  Male who presents with a chief complaint of Heart failure  83M with a PMH of Ischemic Cardiomyopathy (LVEF in 2019 25%) refused Defibrillator in the past, CAD (multiple stents 5-6 last stent over 10 years ago at [facility]), T2DM, afib, CKD3, HTN, HLD , afib presenting with AHRF iso CHF exacerbation with sepsis likely 2/2 aspiration PNA and ZACHARY. s/p BiPAP treatment and now on NC with adequate oxygenation and improvement in hypercarbia. Pending formal S&S eval for concern for aspiration.      (14 Feb 2023 15:27)      INTERVAL HISTORY/OVERNIGHT EVENTS: Metoprolol 12.5 mg given last night, no spironolactone taken as son says he does not take this at home. Patient spoken to in [place] with help of a telephone . He states that his breathing is improved, on 2 L via nasal cannula. He has no cough or expectoration of sputum, but states that this can occur sometimes. He describes having no difficulty with eating or choking on food. He has no other complaints at this time. He has been able to urinate during TOV. I/O recorded as net negative approximately 1.5 L during the past 24 h.    REVIEW OF SYSTEMS:    Constitutional:     [x] negative [ ] fevers [ ] chills  CV:                         [x] negative  [ ] chest pain [ ] orthopnea [ ] palpitations  Resp:                     [x] negative [ ] cough [ ] shortness of breath [ ] sputum  GI:                          [x] negative [ ] nausea [ ] diarrhea [ ] abd pain  :                        [x] negative [ ] dysuria [ ] increased urinary frequency  Endocrine:            [ ] negative [x] diabetes [ ] thyroid problem    [ ] All other systems negative  [ ] Unable to assess ROS because ________.    MEDICATIONS:  MEDICATIONS  (STANDING):  albuterol    90 MICROgram(s) HFA Inhaler 2 Puff(s) Inhalation every 3 hours  albuterol/ipratropium for Nebulization 3 milliLiter(s) Nebulizer every 6 hours  apixaban 2.5 milliGRAM(s) Oral two times a day  atorvastatin 40 milliGRAM(s) Oral at bedtime  buMETAnide 1 milliGRAM(s) Oral every 12 hours  cefepime   IVPB 1000 milliGRAM(s) IV Intermittent every 24 hours  dextrose 5%. 1000 milliLiter(s) (100 mL/Hr) IV Continuous <Continuous>  dextrose 5%. 1000 milliLiter(s) (50 mL/Hr) IV Continuous <Continuous>  dextrose 50% Injectable 25 Gram(s) IV Push once  dextrose 50% Injectable 12.5 Gram(s) IV Push once  dextrose 50% Injectable 25 Gram(s) IV Push once  digoxin     Tablet 125 MICROGram(s) Oral every other day  glucagon  Injectable 1 milliGRAM(s) IntraMuscular once  insulin glargine Injectable (LANTUS) 10 Unit(s) SubCutaneous at bedtime  insulin lispro (ADMELOG) corrective regimen sliding scale   SubCutaneous three times a day before meals  insulin lispro (ADMELOG) corrective regimen sliding scale   SubCutaneous at bedtime  insulin lispro Injectable (ADMELOG) 8 Unit(s) SubCutaneous three times a day before meals  spironolactone 25 milliGRAM(s) Oral daily  tamsulosin 0.8 milliGRAM(s) Oral at bedtime    MEDICATIONS  (PRN):  dextrose Oral Gel 15 Gram(s) Oral once PRN Blood Glucose LESS THAN 70 milliGRAM(s)/deciliter      ALLERGIES: Allergies    No Known Allergies      OBJECTIVE:  ICU Vital Signs Last 24 Hrs  T(C): 37.1 (15 Feb 2023 01:59), Max: 37.1 (15 Feb 2023 01:59)  T(F): 98.7 (15 Feb 2023 01:59), Max: 98.7 (15 Feb 2023 01:59)  HR: 90 (15 Feb 2023 03:35) (75 - 99)  BP: 98/50 (15 Feb 2023 01:59) (88/52 - 98/50)  BP(mean): --  ABP: --  ABP(mean): --  RR: 18 (15 Feb 2023 01:59) (16 - 20)  SpO2: 99% (15 Feb 2023 03:35) (96% - 100%)    O2 Parameters below as of 15 Feb 2023 01:59  Patient On (Oxygen Delivery Method): nasal cannula  O2 Flow (L/min): 2      CAPILLARY BLOOD GLUCOSE      POCT Blood Glucose.: 294 mg/dL (14 Feb 2023 21:42)  POCT Blood Glucose.: 298 mg/dL (14 Feb 2023 16:25)  POCT Blood Glucose.: 304 mg/dL (14 Feb 2023 11:11)  POCT Blood Glucose.: 268 mg/dL (14 Feb 2023 07:02)    CAPILLARY BLOOD GLUCOSE      POCT Blood Glucose.: 294 mg/dL (14 Feb 2023 21:42)    I&O's Summary    13 Feb 2023 07:01  -  14 Feb 2023 07:00  --------------------------------------------------------  IN: 450 mL / OUT: 1200 mL / NET: -750 mL    14 Feb 2023 07:01  -  15 Feb 2023 06:32  --------------------------------------------------------  IN: 610 mL / OUT: 2150 mL / NET: -1540 mL      PHYSICAL EXAMINATION:  General: Comfortable, no acute distress, cooperative with exam.  HEENT: Moist mucous membranes.  Respiratory: Diffuse rales of the bilateral posterior lung fields  CV: Irregular rate and ryhthm, S1S2, no murmurs, rubs or gallops.  Abdominal: Soft, nontender, nondistended  Neurology: AOx3, URIARTE x 4.  Extremities: No pitting edema    LABS:                          9.3    13.48 )-----------( 200      ( 14 Feb 2023 04:20 )             30.2     02-14    133<L>  |  95<L>  |  105<H>  ----------------------------<  317<H>  4.4   |  24  |  2.70<H>    Ca    9.0      14 Feb 2023 04:20  Phos  4.0     02-14  Mg     2.70     02-14    TPro  9.0<H>  /  Alb  2.9<L>  /  TBili  0.8  /  DBili  x   /  AST  18  /  ALT  10  /  AlkPhos  77  02-13    LIVER FUNCTIONS - ( 13 Feb 2023 07:04 )  Alb: 2.9 g/dL / Pro: 9.0 g/dL / ALK PHOS: 77 U/L / ALT: 10 U/L / AST: 18 U/L / GGT: x           PT/INR - ( 13 Feb 2023 07:04 )   PT: 14.9 sec;   INR: 1.28 ratio         PTT - ( 13 Feb 2023 07:04 )  PTT:35.9 sec    EKG: Reviewed    IMAGING: Studies reviewed

## 2023-02-15 NOTE — PROGRESS NOTE ADULT - ASSESSMENT
83M with a PMH of Ischemic Cardiomyopathy (LVEF in 2019 25%) refused Defibrillator in the past, CAD (multiple stents 5-6 last stent over 10 years ago at [facility]), T2DM, afib, CKD3, HTN, HLD , afib presenting with AHRF iso CHF exacerbation with sepsis likely 2/2 aspiration PNA and ZACHARY. s/p BiPAP treatment and now on NC with adequate oxygenation and improvement in hypercarbia. Pending formal S&S eval for concern for aspiration.  83M with a PMH of Ischemic Cardiomyopathy (LVEF in 2019 25%) refused Defibrillator in the past, CAD (multiple stents 5-6 last stent over 10 years ago at [facility]), T2DM, afib, CKD3, HTN, HLD , afib presenting with AHRF iso CHF exacerbation with sepsis due to Pseudomonal PNA and ZACHARY. s/p BiPAP treatment and now on NC with adequate oxygenation and improvement in hypercarbia. Pending formal S&S eval for concern for aspiration.

## 2023-02-15 NOTE — PROGRESS NOTE ADULT - SUBJECTIVE AND OBJECTIVE BOX
Patient seen and examined at bedside.    83M with a PMH of Ischemic Cardiomyopathy (LVEF in 2019 25%) refused Defibrillator in the past, CAD (multiple stents 5-6 last stent over 10 years ago at [facility]), T2DM, Bronchiectasis?, CKD3, HTN, HLD presenting with worsening shortness of breath. Cardiology consulted for elevated troponin. CT shows evidence of aspiration PNA vs pulm edema. Patient on antibiotics and diuresis    Overnight Events: Breathing improving    Review Of Systems: No chest pain, shortness of breath, or palpitations            Current Meds:  albuterol    90 MICROgram(s) HFA Inhaler 2 Puff(s) Inhalation every 3 hours  albuterol/ipratropium for Nebulization 3 milliLiter(s) Nebulizer every 6 hours  apixaban 2.5 milliGRAM(s) Oral two times a day  atorvastatin 40 milliGRAM(s) Oral at bedtime  buMETAnide 1 milliGRAM(s) Oral every 12 hours  cefepime   IVPB 1000 milliGRAM(s) IV Intermittent every 24 hours  dextrose 5%. 1000 milliLiter(s) IV Continuous <Continuous>  dextrose 5%. 1000 milliLiter(s) IV Continuous <Continuous>  dextrose 50% Injectable 25 Gram(s) IV Push once  dextrose 50% Injectable 12.5 Gram(s) IV Push once  dextrose 50% Injectable 25 Gram(s) IV Push once  dextrose Oral Gel 15 Gram(s) Oral once PRN  digoxin     Tablet 125 MICROGram(s) Oral every other day  glucagon  Injectable 1 milliGRAM(s) IntraMuscular once  insulin glargine Injectable (LANTUS) 10 Unit(s) SubCutaneous at bedtime  insulin lispro (ADMELOG) corrective regimen sliding scale   SubCutaneous three times a day before meals  insulin lispro (ADMELOG) corrective regimen sliding scale   SubCutaneous at bedtime  insulin lispro Injectable (ADMELOG) 8 Unit(s) SubCutaneous three times a day before meals  spironolactone 25 milliGRAM(s) Oral daily  tamsulosin 0.8 milliGRAM(s) Oral at bedtime      Vitals:  T(F): 98.7 (02-15), Max: 98.7 (02-15)  HR: 90 (02-15) (75 - 95)  BP: 98/50 (02-15) (88/52 - 98/50)  RR: 18 (02-15)  SpO2: 99% (02-15)  I&O's Summary    13 Feb 2023 07:01  -  14 Feb 2023 07:00  --------------------------------------------------------  IN: 450 mL / OUT: 1200 mL / NET: -750 mL    14 Feb 2023 07:01  -  15 Feb 2023 06:51  --------------------------------------------------------  IN: 610 mL / OUT: 2150 mL / NET: -1540 mL        Physical Exam:  General: NAD  Cardiovascular: Normal S1 S2, No JVD, No murmurs, No edema  Respiratory: Ronchi and mild crackles  Gastrointestinal:  Soft, Non-tender, + BS	  Skin: warm and dry, No rashes, No ecchymoses, No cyanosis	  Extremities:  No clubbing, cyanosis or edema  Vascular: Peripheral pulses palpable 2+ bilaterally                          8.7    12.97 )-----------( 179      ( 15 Feb 2023 05:30 )             28.2     02-14    133<L>  |  95<L>  |  105<H>  ----------------------------<  317<H>  4.4   |  24  |  2.70<H>    Ca    9.0      14 Feb 2023 04:20  Phos  4.0     02-14  Mg     2.70     02-14    TPro  9.0<H>  /  Alb  2.9<L>  /  TBili  0.8  /  DBili  x   /  AST  18  /  ALT  10  /  AlkPhos  77  02-13    PT/INR - ( 13 Feb 2023 07:04 )   PT: 14.9 sec;   INR: 1.28 ratio         PTT - ( 13 Feb 2023 07:04 )  PTT:35.9 sec  CARDIAC MARKERS ( 13 Feb 2023 06:30 )  217 ng/L / x     / x     / 67 U/L / x     / 8.8 ng/mL  CARDIAC MARKERS ( 12 Feb 2023 20:13 )  176 ng/L / x     / x     / 103 U/L / x     / 13.4 ng/mL  CARDIAC MARKERS ( 12 Feb 2023 10:22 )  70 ng/L / x     / x     / x     / x     / x          Serum Pro-Brain Natriuretic Peptide: [id number] pg/mL (02-12 @ 10:22)          New ECG(s): Personally reviewed    Echo:      Study Date: 2/13/2023  [facility]  Study quality: Technically Difficult  Referring Physician: Terri Brand MD  Blood Pressure: 99/51 mmHg  Height: 170 cm  Weight: 59 kg  BSA: 1.7 m2  ------------------------------------------------------------------------  PROCEDURE: Transthoracic echocardiogram with 2-D, M-Mode  and complete spectral and color flow Doppler.  Intravenous ultrasound enhancing agent was administered for  improved left ventricular endocardial border definition.  Following the intravenous injection of ultrasound enhancing  agent, harmonic imaging was performed.  INDICATION: Heart failure, unspecified (I50.9)  ------------------------------------------------------------------------  OBSERVATIONS:  Mitral Valve: Mitral annular calcification, otherwise  normal mitral valve. Mild mitral regurgitation.  Aortic Root: Aortic root not well visualized.  Aortic Valve: Aortic valve not well visualized. Mild aortic  regurgitation.  Left Atrium: Normal left atrium.  LA volume index = 24  cc/m2.  Left Ventricle: Severe global left ventricular systolic  dysfunction.  Estimated LVEF in the 20-25% range (by visual  estimate).  Endocardial visualization enhanced with  intravenous injection of echo contrast (Definity).  No LV  thrombus seen.  Right Heart: Normal right atrium. Normal right ventricular  size and function. Normal tricuspid valve. Minimal  tricuspid regurgitation. Pulmonic valve not well  visualized.  Pericardium/PleuraNormal pericardium with no pericardial  effusion.  ------------------------------------------------------------------------  CONCLUSIONS:  1. Mitral annular calcification, otherwise normal mitral  valve. Mild mitral regurgitation.  2. Aortic valve not well visualized. Mild aortic  regurgitation.  3. Severe global left ventricular systolic dysfunction.  Estimated LVEF in the 20-25% range (by visual estimate).  Endocardial visualization enhanced with intravenous  injection of echo contrast (Definity).  No LV thrombus  seen.  4. Normal right ventricular size and function.  *** No previous Echo exam.  ------------------------------------------------------------------------  Confirmed on  2/13/2023 - 16:54:20 by Otto Oliveros M.D.,  [facility], [facility]      Stress Testing:     Cath:    Imaging:    Interpretation of Telemetry:

## 2023-02-15 NOTE — PHYSICAL THERAPY INITIAL EVALUATION ADULT - PERTINENT HX OF CURRENT PROBLEM, REHAB EVAL
83M with a PMH of Ischemic Cardiomyopathy (LVEF in 2019 25%) refused Defibrillator in the past, CAD (multiple stents 5-6 last stent over 10 years ago at [facility]), T2DM, afib, CKD3, HTN, HLD , afib presenting with AHRF iso CHF exacerbation with sepsis likely 2/2 Aspiration PNA and sebastián.

## 2023-02-15 NOTE — PROGRESS NOTE ADULT - PROBLEM SELECTOR PLAN 9
Takes Lopressor 12.5 BID PO.  - will switch from metoprolol tartrate to metoprolol succinate 25 QD Takes Lopressor 12.5 BID PO.  - Metoprolol succinate 25 QD  - If patient's son refuses to allow administration of Toprol, will revert to Lopressor 12.5 mg PO BID

## 2023-02-15 NOTE — PROGRESS NOTE ADULT - PROBLEM SELECTOR PLAN 4
Having aspirations at home and was hospitalized 8 months ago for ASP PNA. CT chest with bilateral opacities c/f PNA with leukocytosis 19K   - s/p cefepime, flagyll and vancomycin in ED  - will continue with cefepime for broad coverage  - f/u sputum culture if patient is able to induce sputum  - MRSA swab   - aspiration precautions  - Speech and swallow evaluation appreciated Having aspirations at home and was hospitalized 8 months ago for ASP PNA. CT chest with bilateral opacities c/f PNA with leukocytosis 19K   - s/p cefepime, flagyll and vancomycin in ED  - will continue with cefepime for broad coverage  - f/u sputum culture if patient is able to induce sputum  - MRSA swab   - aspiration precautions  - Speech and swallow evaluation appreciated, f/u cine-esophogram Having aspirations at home and was hospitalized 8 months ago for ASP PNA. CT chest with bilateral opacities c/f PNA with leukocytosis 19K. Sputum Gram stain positive for Pseudomonas.  - s/p cefepime, flagyll and vancomycin in ED  - will continue with cefepime for broad coverage  - f/u sputum sensitivities  - MRSA swab   - aspiration precautions  - Speech and swallow evaluation appreciated, f/u cine-esophogram

## 2023-02-15 NOTE — SWALLOW VFSS/MBS ASSESSMENT ADULT - ORAL PHASE
Delayed oral transit time/Reduced anterior - posterior transport/Residue in oral cavity Delayed oral transit time/Reduced anterior - posterior transport Delayed oral transit time/Reduced anterior - posterior transport/Uncontrolled bolus / spillover in hypopharynx/Laryngeal penetration before swallow - silent

## 2023-02-15 NOTE — PROGRESS NOTE ADULT - PROBLEM SELECTOR PLAN 10
DVT ppx: Heparin sq   Diet: pending S&S eval   Dispo: pending medical management DVT ppx: Heparin sq   Diet: Pureed with thin liquids, consistent carbohydrate  Dispo: Pending PT evaluation

## 2023-02-15 NOTE — PROGRESS NOTE ADULT - PROBLEM SELECTOR PLAN 1
Acute hypercarbic and hypoxic respiratory failure to 70s via EMS placed in BIPAP. On admission with pH 7.17 and pCO2 80s concerning for retention.   - Supplemental oxygen to maintain saturation > 92%  - Treatment of underlying causes as described below

## 2023-02-15 NOTE — PROGRESS NOTE ADULT - PROBLEM SELECTOR PLAN 3
Patient with history of COPD with reported wheezing on admission. Appears that this is less likely contributing to his current disease state.  - s/p solumedrol 40mg Q12hr  - Prednisone discontinued  - duonebs q6 hours  - incentive spirometer Patient with history of COPD with reported wheezing on admission. Appears that this is less likely contributing to his current disease state.  - s/p solumedrol 40mg Q12hr  - Prednisone discontinued  - Will change DuoNebs to q.6.h. p.r.n. shortness of breath/wheezing  - incentive spirometer

## 2023-02-16 LAB
ANION GAP SERPL CALC-SCNC: 13 MMOL/L — SIGNIFICANT CHANGE UP (ref 7–14)
BUN SERPL-MCNC: 99 MG/DL — HIGH (ref 7–23)
CALCIUM SERPL-MCNC: 9.1 MG/DL — SIGNIFICANT CHANGE UP (ref 8.4–10.5)
CHLORIDE SERPL-SCNC: 98 MMOL/L — SIGNIFICANT CHANGE UP (ref 98–107)
CO2 SERPL-SCNC: 27 MMOL/L — SIGNIFICANT CHANGE UP (ref 22–31)
CREAT SERPL-MCNC: 2.04 MG/DL — HIGH (ref 0.5–1.3)
CULTURE RESULTS: SIGNIFICANT CHANGE UP
EGFR: 32 ML/MIN/1.73M2 — LOW
GLUCOSE BLDC GLUCOMTR-MCNC: 172 MG/DL — HIGH (ref 70–99)
GLUCOSE BLDC GLUCOMTR-MCNC: 206 MG/DL — HIGH (ref 70–99)
GLUCOSE BLDC GLUCOMTR-MCNC: 219 MG/DL — HIGH (ref 70–99)
GLUCOSE BLDC GLUCOMTR-MCNC: 87 MG/DL — SIGNIFICANT CHANGE UP (ref 70–99)
GLUCOSE BLDC GLUCOMTR-MCNC: 92 MG/DL — SIGNIFICANT CHANGE UP (ref 70–99)
GLUCOSE SERPL-MCNC: 165 MG/DL — HIGH (ref 70–99)
HCT VFR BLD CALC: 31.1 % — LOW (ref 39–50)
HGB BLD-MCNC: 9.5 G/DL — LOW (ref 13–17)
MAGNESIUM SERPL-MCNC: 2.3 MG/DL — SIGNIFICANT CHANGE UP (ref 1.6–2.6)
MCHC RBC-ENTMCNC: 28.5 PG — SIGNIFICANT CHANGE UP (ref 27–34)
MCHC RBC-ENTMCNC: 30.5 GM/DL — LOW (ref 32–36)
MCV RBC AUTO: 93.4 FL — SIGNIFICANT CHANGE UP (ref 80–100)
NRBC # BLD: 0 /100 WBCS — SIGNIFICANT CHANGE UP (ref 0–0)
NRBC # FLD: 0 K/UL — SIGNIFICANT CHANGE UP (ref 0–0)
PHOSPHATE SERPL-MCNC: 3.7 MG/DL — SIGNIFICANT CHANGE UP (ref 2.5–4.5)
PLATELET # BLD AUTO: 188 K/UL — SIGNIFICANT CHANGE UP (ref 150–400)
POTASSIUM SERPL-MCNC: 3.9 MMOL/L — SIGNIFICANT CHANGE UP (ref 3.5–5.3)
POTASSIUM SERPL-SCNC: 3.9 MMOL/L — SIGNIFICANT CHANGE UP (ref 3.5–5.3)
RBC # BLD: 3.33 M/UL — LOW (ref 4.2–5.8)
RBC # FLD: 13.9 % — SIGNIFICANT CHANGE UP (ref 10.3–14.5)
SODIUM SERPL-SCNC: 138 MMOL/L — SIGNIFICANT CHANGE UP (ref 135–145)
WBC # BLD: 9.01 K/UL — SIGNIFICANT CHANGE UP (ref 3.8–10.5)
WBC # FLD AUTO: 9.01 K/UL — SIGNIFICANT CHANGE UP (ref 3.8–10.5)

## 2023-02-16 PROCEDURE — 99232 SBSQ HOSP IP/OBS MODERATE 35: CPT | Mod: GC

## 2023-02-16 PROCEDURE — 93016 CV STRESS TEST SUPVJ ONLY: CPT | Mod: GC

## 2023-02-16 PROCEDURE — 78452 HT MUSCLE IMAGE SPECT MULT: CPT | Mod: 26

## 2023-02-16 PROCEDURE — 93018 CV STRESS TEST I&R ONLY: CPT | Mod: GC

## 2023-02-16 RX ORDER — INSULIN GLARGINE 100 [IU]/ML
5 INJECTION, SOLUTION SUBCUTANEOUS ONCE
Refills: 0 | Status: COMPLETED | OUTPATIENT
Start: 2023-02-16 | End: 2023-02-16

## 2023-02-16 RX ORDER — INSULIN LISPRO 100/ML
4 VIAL (ML) SUBCUTANEOUS
Refills: 0 | Status: DISCONTINUED | OUTPATIENT
Start: 2023-02-16 | End: 2023-02-18

## 2023-02-16 RX ADMIN — Medication 12.5 MILLIGRAM(S): at 21:17

## 2023-02-16 RX ADMIN — Medication 125 MICROGRAM(S): at 11:55

## 2023-02-16 RX ADMIN — CEFEPIME 100 MILLIGRAM(S): 1 INJECTION, POWDER, FOR SOLUTION INTRAMUSCULAR; INTRAVENOUS at 18:19

## 2023-02-16 RX ADMIN — ATORVASTATIN CALCIUM 40 MILLIGRAM(S): 80 TABLET, FILM COATED ORAL at 21:17

## 2023-02-16 RX ADMIN — Medication 4 UNIT(S): at 18:18

## 2023-02-16 RX ADMIN — APIXABAN 2.5 MILLIGRAM(S): 2.5 TABLET, FILM COATED ORAL at 06:43

## 2023-02-16 RX ADMIN — Medication 2: at 07:45

## 2023-02-16 RX ADMIN — APIXABAN 2.5 MILLIGRAM(S): 2.5 TABLET, FILM COATED ORAL at 18:20

## 2023-02-16 RX ADMIN — BUMETANIDE 1 MILLIGRAM(S): 0.25 INJECTION INTRAMUSCULAR; INTRAVENOUS at 18:20

## 2023-02-16 RX ADMIN — Medication 4: at 18:18

## 2023-02-16 RX ADMIN — TAMSULOSIN HYDROCHLORIDE 0.8 MILLIGRAM(S): 0.4 CAPSULE ORAL at 21:16

## 2023-02-16 RX ADMIN — BUMETANIDE 1 MILLIGRAM(S): 0.25 INJECTION INTRAMUSCULAR; INTRAVENOUS at 06:42

## 2023-02-16 RX ADMIN — Medication 8 UNIT(S): at 07:45

## 2023-02-16 RX ADMIN — Medication 12.5 MILLIGRAM(S): at 09:47

## 2023-02-16 NOTE — PROGRESS NOTE ADULT - PROBLEM SELECTOR PLAN 10
DVT ppx: Heparin sq   Diet: Pureed with thin liquids, consistent carbohydrate  Dispo: to home when optimized

## 2023-02-16 NOTE — PROGRESS NOTE ADULT - PROBLEM SELECTOR PLAN 1
Presenting with bilateral pleural effusions with BNP 11K iso of PNA. Unclear if compliant on medications in the outpatient setting. TTE 2019 LVEF 25% refusing defibrillator and optimization of medications.   - c/w home bumex 1 mg PO BID   - Strict I&Os and daily weights   - TTE 2/13 with EF 20-25%   - patient refusing spironolactone and optimization of metoprolol dosing  - c/w metoprolol tartrate (home med) 12.5 BID Having aspirations at home and was hospitalized 8 months ago for ASP PNA. CT chest with bilateral opacities c/f PNA with leukocytosis 19K. Sputum Gram stain positive for Pseudomonas.  - s/p cefepime, flagyll and vancomycin in ED  - s/p cefepime (2/12 - 2/16); will continue on oral therapy with levofloxacin 500 q48 for renal dosing x 7 days given Pseudomonas  - aspiration precautions  - will c/w diet per S/S recs

## 2023-02-16 NOTE — PROGRESS NOTE ADULT - PROBLEM SELECTOR PLAN 3
Patient with history of COPD with reported wheezing on admission. Appears that this is less likely contributing to his current disease state.  - s/p solumedrol 40mg in ED and prednisone 40 x 1 on floors  - will monitor off steroids due to lower suspicion for COPD exacerbation   - Will change DuoNebs to q.6.h. p.r.n. shortness of breath/wheezing  - incentive spirometer Acute hypercarbic and hypoxic respiratory failure to 70s via EMS placed in BIPAP. On admission with pH 7.17 and pCO2 80s concerning for retention.   - Supplemental oxygen to maintain saturation > 92%  - Treatment of underlying causes as described below  - improved

## 2023-02-16 NOTE — PROGRESS NOTE ADULT - PROBLEM SELECTOR PROBLEM 1
Normal vision: sees adequately in most situations; can see medication labels, newsprint Acute on chronic systolic congestive heart failure Sepsis due to pneumonia

## 2023-02-16 NOTE — PROGRESS NOTE ADULT - PROBLEM SELECTOR PLAN 7
Presenting with Cr: 2.78 with slight hyperkalemia 5.8 on admission. Baseline SCr 2.2 in 10/2022. Presentation appears to be ZACHARY on CKD  - Drew catheter removed and patient passed TOV  - Strict I&O Presenting with Cr: 2.78 with slight hyperkalemia 5.8 on admission. Baseline SCr 2.2 in 10/2022. Presentation appears to be ZACHARY on CKD  - Drew catheter removed and patient passed TOV  - Strict I&O  - resolved

## 2023-02-16 NOTE — PROGRESS NOTE ADULT - SUBJECTIVE AND OBJECTIVE BOX
Patient seen and examined at bedside.    83M with a PMH of Ischemic Cardiomyopathy (LVEF in 2019 25%) refused Defibrillator in the past, CAD (multiple stents 5-6 last stent over 10 years ago at [facility]), T2DM, Bronchiectasis?, CKD3, HTN, HLD presenting with worsening shortness of breath. Cardiology consulted for elevated troponin    Overnight Events:     Review Of Systems: No chest pain, shortness of breath, or palpitations            Current Meds:  albuterol/ipratropium for Nebulization 3 milliLiter(s) Nebulizer every 6 hours PRN  apixaban 2.5 milliGRAM(s) Oral two times a day  atorvastatin 40 milliGRAM(s) Oral at bedtime  buMETAnide 1 milliGRAM(s) Oral every 12 hours  cefepime   IVPB 1000 milliGRAM(s) IV Intermittent every 24 hours  dextrose 5%. 1000 milliLiter(s) IV Continuous <Continuous>  dextrose 5%. 1000 milliLiter(s) IV Continuous <Continuous>  dextrose 50% Injectable 25 Gram(s) IV Push once  dextrose 50% Injectable 12.5 Gram(s) IV Push once  dextrose 50% Injectable 25 Gram(s) IV Push once  dextrose Oral Gel 15 Gram(s) Oral once PRN  digoxin     Tablet 125 MICROGram(s) Oral every other day  glucagon  Injectable 1 milliGRAM(s) IntraMuscular once  insulin glargine Injectable (LANTUS) 10 Unit(s) SubCutaneous at bedtime  insulin lispro (ADMELOG) corrective regimen sliding scale   SubCutaneous three times a day before meals  insulin lispro (ADMELOG) corrective regimen sliding scale   SubCutaneous at bedtime  insulin lispro Injectable (ADMELOG) 8 Unit(s) SubCutaneous three times a day before meals  metoprolol tartrate 12.5 milliGRAM(s) Oral every 12 hours  spironolactone 25 milliGRAM(s) Oral daily  tamsulosin 0.8 milliGRAM(s) Oral at bedtime      Vitals:  T(F): 97.6 (02-16), Max: 98.7 (02-15)  HR: 75 (02-16) (75 - 93)  BP: 92/54 (02-16) (90/59 - 106/70)  RR: 18 (02-16)  SpO2: 96% (02-16)  I&O's Summary    15 Feb 2023 07:01  -  16 Feb 2023 07:00  --------------------------------------------------------  IN: 700 mL / OUT: 1700 mL / NET: -1000 mL        Physical Exam:  Appearance: No acute distress; well appearing  Eyes: PERRL, EOMI, pink conjunctiva  HEENT: Normal oral mucosa  Cardiovascular: RRR, S1, S2, no murmurs, rubs, or gallops; no edema; no JVD  Respiratory: Clear to auscultation bilaterally  Gastrointestinal: soft, non-tender, non-distended with normal bowel sounds  Musculoskeletal: No clubbing; no joint deformity   Neurologic: Non-focal  Lymphatic: No lymphadenopathy  Psychiatry: AAOx3, mood & affect appropriate  Skin: No rashes, ecchymoses, or cyanosis                          8.7    12.97 )-----------( 179      ( 15 Feb 2023 05:30 )             28.2     02-15    135  |  96<L>  |  107<H>  ----------------------------<  331<H>  3.9   |  25  |  2.47<H>    Ca    8.7      15 Feb 2023 05:30  Phos  4.9     02-15  Mg     2.40     02-15        CARDIAC MARKERS ( 13 Feb 2023 06:30 )  217 ng/L / x     / x     / 67 U/L / x     / 8.8 ng/mL  CARDIAC MARKERS ( 12 Feb 2023 20:13 )  176 ng/L / x     / x     / 103 U/L / x     / 13.4 ng/mL  CARDIAC MARKERS ( 12 Feb 2023 10:22 )  70 ng/L / x     / x     / x     / x     / x          Serum Pro-Brain Natriuretic Peptide: 57485 pg/mL (02-12 @ 10:22)          New ECG(s): Personally reviewed    Echo:    Stress Testing:     Cath:    Imaging:    Interpretation of Telemetry:

## 2023-02-16 NOTE — PROGRESS NOTE ADULT - ASSESSMENT
83M with a PMH of Ischemic Cardiomyopathy (LVEF in 2019 25%) refused Defibrillator in the past, CAD (multiple stents 5-6 last stent over 10 years ago at [facility]), T2DM, afib, CKD3, HTN, HLD , afib presenting with AHRF iso CHF exacerbation with sepsis due to Pseudomonal PNA and ZACHARY. s/p BiPAP treatment and now on NC with adequate oxygenation and improvement in hypercarbia. Pending formal S&S eval for concern for aspiration.

## 2023-02-16 NOTE — PROGRESS NOTE ADULT - PROBLEM SELECTOR PLAN 2
Acute hypercarbic and hypoxic respiratory failure to 70s via EMS placed in BIPAP. On admission with pH 7.17 and pCO2 80s concerning for retention.   - Supplemental oxygen to maintain saturation > 92%  - Treatment of underlying causes as described below  - improved Presenting with bilateral pleural effusions with BNP 11K iso of PNA. Unclear if compliant on medications in the outpatient setting. TTE 2019 LVEF 25% refusing defibrillator and optimization of medications.   - c/w home bumex 1 mg PO BID   - Strict I&Os and daily weights   - TTE 2/13 with EF 20-25%   - patient refusing spironolactone and optimization of metoprolol dosing  - c/w metoprolol tartrate (home med) 12.5 BID

## 2023-02-16 NOTE — PROGRESS NOTE ADULT - PROBLEM SELECTOR PLAN 6
EKG on admission shows afib RVR.  - c/w eliquis  - c/w digoxin 125 mcg q48 hours  - c/w metoprolol tartrate 12.5 BID as patient is refusing metoprolol succinate 25 QD

## 2023-02-16 NOTE — PROGRESS NOTE ADULT - PROBLEM SELECTOR PLAN 5
On admission with elevated troponin of 70 but without chest pain. With increase in trops but peaked and downtrending at 215. Etiology likely demand ischemia secondary to afib RVR, CHF exacerbation and copd exacerbation (although less likely)  - low suspicion for ACS   - Cardiology recommendations appreciated  - pending nuclear stress test to assess for ischemia

## 2023-02-16 NOTE — PROGRESS NOTE ADULT - PROBLEM SELECTOR PLAN 4
Having aspirations at home and was hospitalized 8 months ago for ASP PNA. CT chest with bilateral opacities c/f PNA with leukocytosis 19K. Sputum Gram stain positive for Pseudomonas.  - s/p cefepime, flagyll and vancomycin in ED  - will continue with cefepime for broad coverage x 5 days   - aspiration precautions  - Speech and swallow evaluation appreciated, f/u cine-esophogram Patient with history of COPD with reported wheezing on admission. Appears that this is less likely contributing to his current disease state.  - s/p solumedrol 40mg in ED and prednisone 40 x 1 on floors  - will monitor off steroids due to lower suspicion for COPD exacerbation   - Will change DuoNebs to q.6.h. p.r.n. shortness of breath/wheezing  - incentive spirometer

## 2023-02-16 NOTE — PROGRESS NOTE ADULT - SUBJECTIVE AND OBJECTIVE BOX
Jeff Calderon  PGY-1 Resident Physician   Pager [phone]/ [phone]    Patient is a 83y old  Male who presents with a chief complaint of CHF exacerbation (16 Feb 2023 07:14)      SUBJECTIVE / OVERNIGHT EVENTS:  Patient seen and evaluated at bedside.    Denies any fevers, chills, CP, or SOB.    Vital Signs Last 24 Hrs  T(C): 36.4 (16 Feb 2023 06:43), Max: 37.1 (15 Feb 2023 17:42)  T(F): 97.6 (16 Feb 2023 06:43), Max: 98.7 (15 Feb 2023 17:42)  HR: 75 (16 Feb 2023 06:43) (75 - 93)  BP: 92/54 (16 Feb 2023 06:43) (90/59 - 106/70)  BP(mean): --  RR: 18 (16 Feb 2023 06:43) (17 - 18)  SpO2: 96% (16 Feb 2023 06:43) (96% - 100%)    Parameters below as of 16 Feb 2023 06:43  Patient On (Oxygen Delivery Method): nasal cannula  O2 Flow (L/min): 2      PHYSICAL EXAM:  GENERAL: NAD, well-developed  CHEST/LUNG: Clear to auscultation bilaterally; No wheeze  HEART: Regular rate and rhythm; Normal S1 S2, No murmurs, rubs, or gallops  ABDOMEN: Soft, Nontender, Nondistended; Bowel sounds present  EXTREMITIES:  2+ Peripheral Pulses, No clubbing, cyanosis, or edema  PSYCH: AAOx3    LABS:                        8.7    12.97 )-----------( 179      ( 15 Feb 2023 05:30 )             28.2     Hgb Trend: 8.7<--, 9.3<--, 10.3<--, 10.5<--  02-15    135  |  96<L>  |  107<H>  ----------------------------<  331<H>  3.9   |  25  |  2.47<H>    Ca    8.7      15 Feb 2023 05:30  Phos  4.9     02-15  Mg     2.40     02-15      Creatinine Trend: 2.47<--, 2.70<--, 2.78<--, 2.64<--, 2.65<--, 2.78<--           Jeff Calderon  PGY-1 Resident Physician   Pager [phone]/ [phone]    Patient is a 83y old  Male who presents with a chief complaint of CHF exacerbation (16 Feb 2023 07:14)      SUBJECTIVE / OVERNIGHT EVENTS:  Patient seen and evaluated at bedside.  Patient this AM states he is feeling better. Not having SOB right now although on 2L NC.  States he feels better because he can void normally again that the catheter has been removed.  Asked again if he would be open to getting a defibrillator for his heart but refusing. Informed him that we will pursue a stress test to test heart function. Patient understands.  No other complaints at this time.     Vital Signs Last 24 Hrs  T(C): 36.4 (16 Feb 2023 06:43), Max: 37.1 (15 Feb 2023 17:42)  T(F): 97.6 (16 Feb 2023 06:43), Max: 98.7 (15 Feb 2023 17:42)  HR: 75 (16 Feb 2023 06:43) (75 - 93)  BP: 92/54 (16 Feb 2023 06:43) (90/59 - 106/70)  BP(mean): --  RR: 18 (16 Feb 2023 06:43) (17 - 18)  SpO2: 96% (16 Feb 2023 06:43) (96% - 100%)    Parameters below as of 16 Feb 2023 06:43  Patient On (Oxygen Delivery Method): nasal cannula  O2 Flow (L/min): 2      PHYSICAL EXAM:  GENERAL: laying in bed, looks comfortable overall. urinal at bedside with clear urine  CHEST/LUNG: crackles noted in his bilateral lung bases, no wheezes noted on exam.  HEART: Regular rate and rhythm; Normal S1 S2, No murmurs, rubs, or gallops  ABDOMEN: Soft, Nontender, Nondistended; Bowel sounds present  EXTREMITIES:  2+ Peripheral Pulses, No clubbing, cyanosis, or edema  PSYCH: AAOx3    LABS:                        8.7    12.97 )-----------( 179      ( 15 Feb 2023 05:30 )             28.2     Hgb Trend: 8.7<--, 9.3<--, 10.3<--, 10.5<--  02-15    135  |  96<L>  |  107<H>  ----------------------------<  331<H>  3.9   |  25  |  2.47<H>    Ca    8.7      15 Feb 2023 05:30  Phos  4.9     02-15  Mg     2.40     02-15      Creatinine Trend: 2.47<--, 2.70<--, 2.78<--, 2.64<--, 2.65<--, 2.78<--

## 2023-02-16 NOTE — PROGRESS NOTE ADULT - ASSESSMENT
83M with a PMH of Ischemic Cardiomyopathy (LVEF in 2019 25%) refused Defibrillator in the past, CAD (multiple stents 5-6 last stent over 10 years ago at [facility]), T2DM, Bronchiectasis?, CKD3, HTN, HLD presenting with worsening shortness of breath. Cardiology consulted for elevated troponin    1. Hypoxic/Hypercarbic Respiratory Failure - likely in the setting of acute heart failure exacerbation also concerned for underlying aspiration? 8 months ago admitted to [facility] for aspiration PNA and has been coughing on water  2. Acute on Chronic Heart Failure - TTE 2019 LVEF 25% refusing defibrillator on Bumex 1mg PO Q12H  3. ZACHARY on CKD  4. Elevated Troponin      #Elevated Troponin  - Patient with elevated CE  - Troponinemia most likely in the setting of demand ischemia potentially due to an aspiration event  - EKG shows no ischemic changes (Afib with known RBBB)  - Will obtain pharm nuclear stress test to assess for ischemia  - Continuous cardiac monitoring to monitor for arrhythmias  - CBC, CMP, coags, HbA1C, TSH, lipids for comorbidities, Trended to peak no concerns for ACS at this time.   -Continue atorvastatin 40mg daily  -Continue metop succinate 25mg daily      #HFrEF (EF 25%)  Patient's shortness of breath is most likely due to aspiration as the patient does not appear to be significantly fluid overloaded on exam. TTE shows evidence of low EF which is suspected in becky patient with previous ICM.   - ECHO obtained, results noted  - Transition bumex IV to home bumex 1mg PO BID  - Fluid restrict daily weights strict I/Os goal net negative 1-2L  -Keep net negative 0.5L a day  -Continue metop succinate 25mg daily  -Unable to start ACE/ARB/ARni in the setting of elevated creatinine, unclear if he is at baseline at this time  -Continue with aldactone 25mg daily    #Atrial fibrillation  -Continue apixaban  -Continue metoprolol as above    Chivo Weinstein MD  Cardiology fellow

## 2023-02-16 NOTE — PROGRESS NOTE ADULT - PROBLEM SELECTOR PLAN 9
Takes Lopressor 12.5 BID PO.  - c/w Lopressor 12.5 mg PO BID as patient and son refusing optimization with metoprolol succinate 25 QD

## 2023-02-17 LAB
ANION GAP SERPL CALC-SCNC: 13 MMOL/L — SIGNIFICANT CHANGE UP (ref 7–14)
BUN SERPL-MCNC: 94 MG/DL — HIGH (ref 7–23)
CALCIUM SERPL-MCNC: 9.1 MG/DL — SIGNIFICANT CHANGE UP (ref 8.4–10.5)
CHLORIDE SERPL-SCNC: 97 MMOL/L — LOW (ref 98–107)
CO2 SERPL-SCNC: 27 MMOL/L — SIGNIFICANT CHANGE UP (ref 22–31)
CREAT SERPL-MCNC: 1.81 MG/DL — HIGH (ref 0.5–1.3)
CULTURE RESULTS: SIGNIFICANT CHANGE UP
CULTURE RESULTS: SIGNIFICANT CHANGE UP
EGFR: 36 ML/MIN/1.73M2 — LOW
GLUCOSE BLDC GLUCOMTR-MCNC: 119 MG/DL — HIGH (ref 70–99)
GLUCOSE BLDC GLUCOMTR-MCNC: 131 MG/DL — HIGH (ref 70–99)
GLUCOSE BLDC GLUCOMTR-MCNC: 156 MG/DL — HIGH (ref 70–99)
GLUCOSE BLDC GLUCOMTR-MCNC: 186 MG/DL — HIGH (ref 70–99)
GLUCOSE BLDC GLUCOMTR-MCNC: 216 MG/DL — HIGH (ref 70–99)
GLUCOSE SERPL-MCNC: 118 MG/DL — HIGH (ref 70–99)
MAGNESIUM SERPL-MCNC: 2.2 MG/DL — SIGNIFICANT CHANGE UP (ref 1.6–2.6)
PHOSPHATE SERPL-MCNC: 2.9 MG/DL — SIGNIFICANT CHANGE UP (ref 2.5–4.5)
POTASSIUM SERPL-MCNC: 3.5 MMOL/L — SIGNIFICANT CHANGE UP (ref 3.5–5.3)
POTASSIUM SERPL-SCNC: 3.5 MMOL/L — SIGNIFICANT CHANGE UP (ref 3.5–5.3)
SODIUM SERPL-SCNC: 137 MMOL/L — SIGNIFICANT CHANGE UP (ref 135–145)
SPECIMEN SOURCE: SIGNIFICANT CHANGE UP
SPECIMEN SOURCE: SIGNIFICANT CHANGE UP

## 2023-02-17 PROCEDURE — 99232 SBSQ HOSP IP/OBS MODERATE 35: CPT | Mod: GC

## 2023-02-17 PROCEDURE — 99254 IP/OBS CNSLTJ NEW/EST MOD 60: CPT | Mod: GC

## 2023-02-17 PROCEDURE — 99222 1ST HOSP IP/OBS MODERATE 55: CPT | Mod: GC

## 2023-02-17 RX ORDER — INSULIN GLARGINE 100 [IU]/ML
5 INJECTION, SOLUTION SUBCUTANEOUS AT BEDTIME
Refills: 0 | Status: DISCONTINUED | OUTPATIENT
Start: 2023-02-17 | End: 2023-02-18

## 2023-02-17 RX ORDER — POTASSIUM CHLORIDE 20 MEQ
40 PACKET (EA) ORAL ONCE
Refills: 0 | Status: COMPLETED | OUTPATIENT
Start: 2023-02-17 | End: 2023-02-17

## 2023-02-17 RX ADMIN — SPIRONOLACTONE 25 MILLIGRAM(S): 25 TABLET, FILM COATED ORAL at 05:30

## 2023-02-17 RX ADMIN — Medication 4 UNIT(S): at 11:37

## 2023-02-17 RX ADMIN — Medication 2: at 07:39

## 2023-02-17 RX ADMIN — Medication 40 MILLIEQUIVALENT(S): at 11:38

## 2023-02-17 RX ADMIN — Medication 4: at 17:42

## 2023-02-17 RX ADMIN — Medication 4 UNIT(S): at 07:39

## 2023-02-17 RX ADMIN — APIXABAN 2.5 MILLIGRAM(S): 2.5 TABLET, FILM COATED ORAL at 17:48

## 2023-02-17 RX ADMIN — TAMSULOSIN HYDROCHLORIDE 0.8 MILLIGRAM(S): 0.4 CAPSULE ORAL at 21:33

## 2023-02-17 RX ADMIN — Medication 12.5 MILLIGRAM(S): at 09:55

## 2023-02-17 RX ADMIN — BUMETANIDE 1 MILLIGRAM(S): 0.25 INJECTION INTRAMUSCULAR; INTRAVENOUS at 17:47

## 2023-02-17 RX ADMIN — APIXABAN 2.5 MILLIGRAM(S): 2.5 TABLET, FILM COATED ORAL at 05:30

## 2023-02-17 RX ADMIN — INSULIN GLARGINE 5 UNIT(S): 100 INJECTION, SOLUTION SUBCUTANEOUS at 00:30

## 2023-02-17 RX ADMIN — BUMETANIDE 1 MILLIGRAM(S): 0.25 INJECTION INTRAMUSCULAR; INTRAVENOUS at 05:31

## 2023-02-17 NOTE — PROGRESS NOTE ADULT - SUBJECTIVE AND OBJECTIVE BOX
Jeff Calderon  PGY-1 Resident Physician   Pager [phone]    Patient is a 83y old  Male who presents with a chief complaint of CHF exacerbation (16 Feb 2023 07:42)      SUBJECTIVE / OVERNIGHT EVENTS:  Patient seen and evaluated at bedside.    Denies any fevers, chills, CP, or SOB.    Vital Signs Last 24 Hrs  T(C): 36.6 (17 Feb 2023 05:27), Max: 36.7 (16 Feb 2023 18:00)  T(F): 97.9 (17 Feb 2023 05:27), Max: 98.1 (16 Feb 2023 21:14)  HR: 77 (17 Feb 2023 05:27) (75 - 90)  BP: 103/54 (17 Feb 2023 05:27) (98/52 - 109/72)  BP(mean): --  RR: 18 (17 Feb 2023 05:27) (17 - 18)  SpO2: 99% (17 Feb 2023 05:27) (97% - 99%)    Parameters below as of 17 Feb 2023 05:27  Patient On (Oxygen Delivery Method): nasal cannula  O2 Flow (L/min): 2      PHYSICAL EXAM:  GENERAL: NAD, well-developed  CHEST/LUNG: Clear to auscultation bilaterally; No wheeze  HEART: Regular rate and rhythm; Normal S1 S2, No murmurs, rubs, or gallops  ABDOMEN: Soft, Nontender, Nondistended; Bowel sounds present  EXTREMITIES:  2+ Peripheral Pulses, No clubbing, cyanosis, or edema  PSYCH: AAOx3    LABS:                        9.5    9.01  )-----------( 188      ( 16 Feb 2023 05:08 )             31.1     Hgb Trend: 9.5<--, 8.7<--, 9.3<--, 10.3<--, 10.5<--  02-16    138  |  98  |  99<H>  ----------------------------<  165<H>  3.9   |  27  |  2.04<H>    Ca    9.1      16 Feb 2023 05:08  Phos  3.7     02-16  Mg     2.30     02-16      Creatinine Trend: 2.04<--, 2.47<--, 2.70<--, 2.78<--, 2.64<--, 2.65<--           Jeff Calderon  PGY-1 Resident Physician   Pager [phone]    Patient is a 83y old  Male who presents with a chief complaint of CHF exacerbation (16 Feb 2023 07:42)      SUBJECTIVE / OVERNIGHT EVENTS:  Patient seen and evaluated at bedside.  Patient this morning without concerns. States he is not having any shortness of breath. States overall symptoms have improved. Continuing to urinate in urinal without problems.   Advised he may be discharged soon. No questions or concerns.     Vital Signs Last 24 Hrs  T(C): 36.6 (17 Feb 2023 05:27), Max: 36.7 (16 Feb 2023 18:00)  T(F): 97.9 (17 Feb 2023 05:27), Max: 98.1 (16 Feb 2023 21:14)  HR: 77 (17 Feb 2023 05:27) (75 - 90)  BP: 103/54 (17 Feb 2023 05:27) (98/52 - 109/72)  BP(mean): --  RR: 18 (17 Feb 2023 05:27) (17 - 18)  SpO2: 99% (17 Feb 2023 05:27) (97% - 99%)    Parameters below as of 17 Feb 2023 05:27  Patient On (Oxygen Delivery Method): nasal cannula  O2 Flow (L/min): 2      PHYSICAL EXAM:  GENERAL: NAD, well-developed  CHEST/LUNG: Crackles noted on L lower lung field unchanged from admission. Otherwise clear. No wheezing,  HEART: irregularly irregular on auscultation  ABDOMEN: Soft, Nontender, Nondistended; Bowel sounds present  EXTREMITIES:  2+ Peripheral Pulses, No clubbing, cyanosis, or edema  PSYCH: AAOx3    LABS:                        9.5    9.01  )-----------( 188      ( 16 Feb 2023 05:08 )             31.1     Hgb Trend: 9.5<--, 8.7<--, 9.3<--, 10.3<--, 10.5<--  02-16    138  |  98  |  99<H>  ----------------------------<  165<H>  3.9   |  27  |  2.04<H>    Ca    9.1      16 Feb 2023 05:08  Phos  3.7     02-16  Mg     2.30     02-16      Creatinine Trend: 2.04<--, 2.47<--, 2.70<--, 2.78<--, 2.64<--, 2.65<--

## 2023-02-17 NOTE — CONSULT NOTE ADULT - SUBJECTIVE AND OBJECTIVE BOX
Patient is a 83y old  Male who presents with a chief complaint of CHF exacerbation (17 Feb 2023 07:17)    HPI:  *Used  Alexandra #[id number]  83M with a PMH of Ischemic Cardiomyopathy (LVEF in 2019 25%) refused Defibrillator in the past, CAD (multiple stents 5-6 last stent over 10 years ago at [facility]), T2DM, afib, CKD3, HTN, HLD , afib presenting with worsening shortness of breath. Patient was hospitalized 8 Months ago for aspiration PNA at [facility]. Since being discharged he has been struggling with aspiration at home , has trouble swallowing even puree foods and water / liquids. After he eats he will cough. Over the past few weeks he has had a few episodes of aspirating at home. He began having worsening SOB past few days. He gets sob even with walking a few steps. He does not follow w a cardiologist. He gets his meds from his PCP, he claims he is compliant with his medications. He also endorses white/clear sputum production at home. Denies recent fevers, chills, cp, abd pain, n/v, leg pain, melena, hematochezia, hematemesis.     ED: 101/57, HR 93, Afebrile, BIPAP settings 10/5, FIO2 40% pulling good TV        (12 Feb 2023 19:39)       REVIEW OF SYSTEMS  [  ] ROS unobtainable because:    [ x ] All other systems negative except as noted below    Constitutional:  [ ] fever [ ] chills  [ ] weight loss  [ ]night sweat  [ ]poor appetite/PO intake [ ]fatigue   Skin:  [ ] rash [ ] phlebitis	  Eyes: [ ] icterus [ ] pain  [ ] discharge	  ENMT: [ ] sore throat  [ ] thrush [ ] ulcers [ ] exudates [ ]anosmia  Respiratory: [ ] dyspnea [ ] hemoptysis [ ] cough [ ] sputum	  Cardiovascular:  [ ] chest pain [ ] palpitations [ ] edema	  Gastrointestinal:  [ ] nausea [ ] vomiting [ ] diarrhea [ ] constipation [ ] pain	  Genitourinary:  [ ] dysuria [ ] frequency [ ] hematuria [ ] discharge [ ] flank pain  [ ] incontinence  Musculoskeletal:  [ ] myalgias [ ] arthralgias [ ] arthritis  [ ] back pain  Neurological:  [ ] headache [ ] weakness [ ] seizures  [ ] confusion/altered mental status    prior hospital charts reviewed [V]  primary team notes reviewed [V]  other consultant notes reviewed [V]    PAST MEDICAL & SURGICAL HISTORY:  DM (diabetes mellitus)  Afib  HTN (hypertension)  H/O CHF  No significant past surgical history    SOCIAL HISTORY:  - Denied smoking/vaping/alcohol/recreational drug use    FAMILY HISTORY:  No pertinent family history in first degree relatives        Allergies  No Known Allergies        ANTIMICROBIALS:  levoFLOXacin  Tablet 750 every 48 hours      ANTIMICROBIALS (past 90 days):  MEDICATIONS  (STANDING):    cefepime   IVPB   100 mL/Hr IV Intermittent (02-12-23 @ 14:46)    cefepime   IVPB   100 mL/Hr IV Intermittent (02-16-23 @ 18:19)   100 mL/Hr IV Intermittent (02-15-23 @ 14:38)   100 mL/Hr IV Intermittent (02-14-23 @ 15:35)   100 mL/Hr IV Intermittent (02-13-23 @ 15:18)    levoFLOXacin  Tablet   750 milliGRAM(s) Oral (02-17-23 @ 07:40)    metroNIDAZOLE  IVPB   100 mL/Hr IV Intermittent (02-12-23 @ 15:06)    piperacillin/tazobactam IVPB.   200 mL/Hr IV Intermittent (02-12-23 @ 22:14)    piperacillin/tazobactam IVPB..   25 mL/Hr IV Intermittent (02-13-23 @ 07:49)    vancomycin  IVPB   250 mL/Hr IV Intermittent (02-12-23 @ 22:52)        OTHER MEDS:   MEDICATIONS  (STANDING):  albuterol/ipratropium for Nebulization 3 every 6 hours PRN  apixaban 2.5 two times a day  atorvastatin 40 at bedtime  buMETAnide 1 every 12 hours  dextrose 50% Injectable 25 once  dextrose 50% Injectable 12.5 once  dextrose 50% Injectable 25 once  dextrose Oral Gel 15 once PRN  digoxin     Tablet 125 every other day  glucagon  Injectable 1 once  insulin lispro (ADMELOG) corrective regimen sliding scale  three times a day before meals  insulin lispro (ADMELOG) corrective regimen sliding scale  at bedtime  insulin lispro Injectable (ADMELOG) 4 three times a day before meals  metoprolol tartrate 12.5 every 12 hours  spironolactone 25 daily  tamsulosin 0.8 at bedtime      VITALS:  Vital Signs Last 24 Hrs  T(F): 97.9 (02-17-23 @ 05:27), Max: 98.8 (02-12-23 @ 10:54)    Vital Signs Last 24 Hrs  HR: 77 (02-17-23 @ 05:27) (75 - 90)  BP: 103/54 (02-17-23 @ 05:27) (98/52 - 109/72)  RR: 18 (02-17-23 @ 05:27)  SpO2: 99% (02-17-23 @ 05:27) (97% - 99%)  Wt(kg): --    EXAM:    GA: NAD, AOx3  HEENT: oral cavity no lesion  CV: nl S1/S2, no RMG  Lungs: CTAB, No distress  Abd: BS+, soft, nontender, no rebounding pain  Ext: no edema  Neuro: No focal deficits  Skin: Intact  IV: no phlebitis    Labs:                        9.5    9.01  )-----------( 188      ( 16 Feb 2023 05:08 )             31.1     02-17    137  |  97<L>  |  94<H>  ----------------------------<  118<H>  3.5   |  27  |  1.81<H>    Ca    9.1      17 Feb 2023 05:18  Phos  2.9     02-17  Mg     2.20     02-17        WBC Trend:  WBC Count: 9.01 (02-16-23 @ 05:08)  WBC Count: 12.97 (02-15-23 @ 05:30)  WBC Count: 13.48 (02-14-23 @ 04:20)  WBC Count: 15.89 (02-13-23 @ 07:04)      Auto Neutrophil #: 12.68 K/uL (02-13-23 @ 07:04)  Auto Neutrophil #: 16.61 K/uL (02-12-23 @ 10:22)      Creatine Trend:  Creatinine, Serum: 1.81 (02-17)  Creatinine, Serum: 2.04 (02-16)  Creatinine, Serum: 2.47 (02-15)  Creatinine, Serum: 2.70 (02-14)      Liver Biochemical Testing Trend:  Alanine Aminotransferase (ALT/SGPT): 10 (02-13)  Alanine Aminotransferase (ALT/SGPT): 14 (02-12)  Aspartate Aminotransferase (AST/SGOT): 18 (02-13-23 @ 07:04)  Aspartate Aminotransferase (AST/SGOT): 41 (02-12-23 @ 10:22)  Bilirubin Total, Serum: 0.8 (02-13)  Bilirubin Total, Serum: 0.8 (02-12)      Trend LDH              MICROBIOLOGY:        Culture - Sputum (collected 12 Feb 2023 14:13)  Source: .Sputum Sputum  Final Report:    Mold Like Fungus Referred to Mycology    Moderate Mucin producing Pseudomonas aeruginosa    Normal Respiratory Oriana present  Organism: Pseudomonas aeruginosa  Organism: Pseudomonas aeruginosa    Sensitivities:      -  Amikacin: S <=16      -  Aztreonam: S <=4      -  Cefepime: S <=2      -  Ceftazidime: S <=1      -  Ciprofloxacin: S <=0.25      -  Gentamicin: S <=2      -  Imipenem: S <=1      -  Levofloxacin: S <=0.5      -  Meropenem: S <=1      -  Piperacillin/Tazobactam: S <=8      -  Tobramycin: S <=2      Method Type: LORENA    Culture - Urine (collected 12 Feb 2023 11:46)  Source: Clean Catch Clean Catch (Midstream)  Final Report:    <10,000 CFU/mL Normal Urogenital Oriana    Culture - Blood (collected 12 Feb 2023 10:33)  Source: .Blood Blood-Peripheral  Preliminary Report:    No growth to date.    Culture - Blood (collected 12 Feb 2023 10:20)  Source: .Blood Blood-Peripheral  Preliminary Report:    No growth to date.    Legionella Antigen, Urine: Negative (02-13 @ 17:03)  Procalcitonin, Serum: 1.29 (02-14)  Procalcitonin, Serum: 1.40 (02-12)  Serum Pro-Brain Natriuretic Peptide: 59180 (02-12)    Troponin T, High Sensitivity Result: 217 (02-13)  Troponin T, High Sensitivity Result: 215 (02-13)  Troponin T, High Sensitivity Result: 176 (02-12)  Troponin T, High Sensitivity Result: 70 (02-12)      A1C with Estimated Average Glucose Result: 7.4 % (02-13-23 @ 07:04)  A1C with Estimated Average Glucose Result: 7.7 % (02-12-23 @ 19:50)    RADIOLOGY:  imaging below personally reviewed    < from: CT Chest No Cont (02.12.23 @ 14:11) >  Secretions throughout the trachea and right main stem bronchus. Emphysema. Scattered bilateral nodular and patchy opacities  within the right upper, right middle and left upper lobes, the largest in  the right middle lobe. Emphysema with patchy bilateral lung opacities that may represent a combination of fluid overload and infectious/inflammatory process.    < end of copied text >    < from: TTE with Doppler (w/Cont) (02.13.23 @ 15:39) >  1. Mitral annular calcification, otherwise normal mitral valve. Mild mitral regurgitation. Aortic valve not well visualized. Mild aortic regurgitation. Severe global left ventricular systolic dysfunction. Estimated LVEF in the 20-25% range (by visual estimate). Endocardial visualization enhanced with intravenous injection of echo contrast (Definity).  No LV thrombus seen. Normal right ventricular size and function.  < end of copied text >   Patient is a 83y old  Male who presents with a chief complaint of CHF exacerbation (17 Feb 2023 07:17)    HPI:  is a 83 yr old Algerian speaking male with a PMH of Ischemic Cardiomyopathy (LVEF in 2019 25%) refused Defibrillator in the past, CAD (multiple stents 5-6 last stent over 10 years ago at [facility]), T2DM, afib, CKD3, HTN, HLD , afib presenting with worsening shortness of breath since few days. Patient was hospitalized 8 Months ago for aspiration PNA at [facility]. Since being discharged he has been struggling with aspiration at home , has trouble swallowing even puree foods and water / liquids. After he eats he will cough. Over the past few weeks he has had a few episodes of aspirating at home. He began having worsening SOB past few days. He gets sob even with walking a few steps. He also endorses white/clear sputum production at home. Denies recent fevers, chills, cp, abd pain, n/v, leg pain, melena, hematochezia, hematemesis.     In ED: Pt was Afebrile, with wbc of 19k. CT chest with B/L Opacities and pt with elevated BNP Pt was started on BIPAP 10/5, FIO2 40% pulling good TV        (12 Feb 2023 19:39)       REVIEW OF SYSTEMS  [  ] ROS unobtainable because:    [ x ] All other systems negative except as noted below    Constitutional:  [ ] fever [ ] chills  [ ] weight loss  [ ]night sweat  [ ]poor appetite/PO intake [ ]fatigue   Skin:  [ ] rash [ ] phlebitis	  Eyes: [ ] icterus [ ] pain  [ ] discharge	  ENMT: [ ] sore throat  [ ] thrush [ ] ulcers [ ] exudates [ ]anosmia  Respiratory: [ ] dyspnea [ ] hemoptysis [ ] cough [ ] sputum	  Cardiovascular:  [ ] chest pain [ ] palpitations [ ] edema	  Gastrointestinal:  [ ] nausea [ ] vomiting [ ] diarrhea [ ] constipation [ ] pain	  Genitourinary:  [ ] dysuria [ ] frequency [ ] hematuria [ ] discharge [ ] flank pain  [ ] incontinence  Musculoskeletal:  [ ] myalgias [ ] arthralgias [ ] arthritis  [ ] back pain  Neurological:  [ ] headache [ ] weakness [ ] seizures  [ ] confusion/altered mental status    prior hospital charts reviewed [V]  primary team notes reviewed [V]  other consultant notes reviewed [V]    PAST MEDICAL & SURGICAL HISTORY:  DM (diabetes mellitus)  Afib  HTN (hypertension)  H/O CHF  No significant past surgical history    SOCIAL HISTORY:  - Denied smoking/vaping/alcohol/recreational drug use    FAMILY HISTORY:  No pertinent family history in first degree relatives        Allergies  No Known Allergies        ANTIMICROBIALS:  levoFLOXacin  Tablet 750 every 48 hours      ANTIMICROBIALS (past 90 days):  MEDICATIONS  (STANDING):    cefepime   IVPB   100 mL/Hr IV Intermittent (02-12-23 @ 14:46)    cefepime   IVPB   100 mL/Hr IV Intermittent (02-16-23 @ 18:19)   100 mL/Hr IV Intermittent (02-15-23 @ 14:38)   100 mL/Hr IV Intermittent (02-14-23 @ 15:35)   100 mL/Hr IV Intermittent (02-13-23 @ 15:18)    levoFLOXacin  Tablet   750 milliGRAM(s) Oral (02-17-23 @ 07:40)    metroNIDAZOLE  IVPB   100 mL/Hr IV Intermittent (02-12-23 @ 15:06)    piperacillin/tazobactam IVPB.   200 mL/Hr IV Intermittent (02-12-23 @ 22:14)    piperacillin/tazobactam IVPB..   25 mL/Hr IV Intermittent (02-13-23 @ 07:49)    vancomycin  IVPB   250 mL/Hr IV Intermittent (02-12-23 @ 22:52)        OTHER MEDS:   MEDICATIONS  (STANDING):  albuterol/ipratropium for Nebulization 3 every 6 hours PRN  apixaban 2.5 two times a day  atorvastatin 40 at bedtime  buMETAnide 1 every 12 hours  dextrose 50% Injectable 25 once  dextrose 50% Injectable 12.5 once  dextrose 50% Injectable 25 once  dextrose Oral Gel 15 once PRN  digoxin     Tablet 125 every other day  glucagon  Injectable 1 once  insulin lispro (ADMELOG) corrective regimen sliding scale  three times a day before meals  insulin lispro (ADMELOG) corrective regimen sliding scale  at bedtime  insulin lispro Injectable (ADMELOG) 4 three times a day before meals  metoprolol tartrate 12.5 every 12 hours  spironolactone 25 daily  tamsulosin 0.8 at bedtime      VITALS:  Vital Signs Last 24 Hrs  T(F): 97.9 (02-17-23 @ 05:27), Max: 98.8 (02-12-23 @ 10:54)    Vital Signs Last 24 Hrs  HR: 77 (02-17-23 @ 05:27) (75 - 90)  BP: 103/54 (02-17-23 @ 05:27) (98/52 - 109/72)  RR: 18 (02-17-23 @ 05:27)  SpO2: 99% (02-17-23 @ 05:27) (97% - 99%)  Wt(kg): --    EXAM:    GA: NAD, AOx3  HEENT: oral cavity no lesion  CV: nl S1/S2, no RMG  Lungs: CTAB, No distress  Abd: BS+, soft, nontender, no rebounding pain  Ext: no edema  Neuro: No focal deficits  Skin: Intact  IV: no phlebitis    Labs:                        9.5    9.01  )-----------( 188      ( 16 Feb 2023 05:08 )             31.1     02-17    137  |  97<L>  |  94<H>  ----------------------------<  118<H>  3.5   |  27  |  1.81<H>    Ca    9.1      17 Feb 2023 05:18  Phos  2.9     02-17  Mg     2.20     02-17        WBC Trend:  WBC Count: 9.01 (02-16-23 @ 05:08)  WBC Count: 12.97 (02-15-23 @ 05:30)  WBC Count: 13.48 (02-14-23 @ 04:20)  WBC Count: 15.89 (02-13-23 @ 07:04)      Auto Neutrophil #: 12.68 K/uL (02-13-23 @ 07:04)  Auto Neutrophil #: 16.61 K/uL (02-12-23 @ 10:22)      Creatine Trend:  Creatinine, Serum: 1.81 (02-17)  Creatinine, Serum: 2.04 (02-16)  Creatinine, Serum: 2.47 (02-15)  Creatinine, Serum: 2.70 (02-14)      Liver Biochemical Testing Trend:  Alanine Aminotransferase (ALT/SGPT): 10 (02-13)  Alanine Aminotransferase (ALT/SGPT): 14 (02-12)  Aspartate Aminotransferase (AST/SGOT): 18 (02-13-23 @ 07:04)  Aspartate Aminotransferase (AST/SGOT): 41 (02-12-23 @ 10:22)  Bilirubin Total, Serum: 0.8 (02-13)  Bilirubin Total, Serum: 0.8 (02-12)      Trend LDH              MICROBIOLOGY:        Culture - Sputum (collected 12 Feb 2023 14:13)  Source: .Sputum Sputum  Final Report:    Mold Like Fungus Referred to Mycology    Moderate Mucin producing Pseudomonas aeruginosa    Normal Respiratory Oriana present  Organism: Pseudomonas aeruginosa  Organism: Pseudomonas aeruginosa    Sensitivities:      -  Amikacin: S <=16      -  Aztreonam: S <=4      -  Cefepime: S <=2      -  Ceftazidime: S <=1      -  Ciprofloxacin: S <=0.25      -  Gentamicin: S <=2      -  Imipenem: S <=1      -  Levofloxacin: S <=0.5      -  Meropenem: S <=1      -  Piperacillin/Tazobactam: S <=8      -  Tobramycin: S <=2      Method Type: LORENA    Culture - Urine (collected 12 Feb 2023 11:46)  Source: Clean Catch Clean Catch (Midstream)  Final Report:    <10,000 CFU/mL Normal Urogenital Oriana    Culture - Blood (collected 12 Feb 2023 10:33)  Source: .Blood Blood-Peripheral  Preliminary Report:    No growth to date.    Culture - Blood (collected 12 Feb 2023 10:20)  Source: .Blood Blood-Peripheral  Preliminary Report:    No growth to date.    Legionella Antigen, Urine: Negative (02-13 @ 17:03)  Procalcitonin, Serum: 1.29 (02-14)  Procalcitonin, Serum: 1.40 (02-12)  Serum Pro-Brain Natriuretic Peptide: 29655 (02-12)    Troponin T, High Sensitivity Result: 217 (02-13)  Troponin T, High Sensitivity Result: 215 (02-13)  Troponin T, High Sensitivity Result: 176 (02-12)  Troponin T, High Sensitivity Result: 70 (02-12)      A1C with Estimated Average Glucose Result: 7.4 % (02-13-23 @ 07:04)  A1C with Estimated Average Glucose Result: 7.7 % (02-12-23 @ 19:50)    RADIOLOGY:  imaging below personally reviewed    < from: CT Chest No Cont (02.12.23 @ 14:11) >  Secretions throughout the trachea and right main stem bronchus. Emphysema. Scattered bilateral nodular and patchy opacities  within the right upper, right middle and left upper lobes, the largest in  the right middle lobe. Emphysema with patchy bilateral lung opacities that may represent a combination of fluid overload and infectious/inflammatory process.    < end of copied text >    < from: TTE with Doppler (w/Cont) (02.13.23 @ 15:39) >  1. Mitral annular calcification, otherwise normal mitral valve. Mild mitral regurgitation. Aortic valve not well visualized. Mild aortic regurgitation. Severe global left ventricular systolic dysfunction. Estimated LVEF in the 20-25% range (by visual estimate). Endocardial visualization enhanced with intravenous injection of echo contrast (Definity).  No LV thrombus seen. Normal right ventricular size and function.  < end of copied text >   Patient is a 83y old  Male who presents with a chief complaint of CHF exacerbation (17 Feb 2023 07:17)    HPI:  is a 83 yr old Pakistani speaking male with a PMH of Ischemic Cardiomyopathy (LVEF in 2019 25%) refused Defibrillator in the past, CAD (multiple stents 5-6 last stent over 10 years ago at [facility]), T2DM, afib, CKD3, HTN, HLD , afib presenting with worsening shortness of breath since few days. Patient was hospitalized 8 Months ago for aspiration PNA at [facility]. Since being discharged he has been struggling with aspiration at home , has trouble swallowing even puree foods and water / liquids. After he eats he will cough. Over the past few weeks he has had a few episodes of aspirating at home. He began having worsening SOB past few days. He gets sob even with walking a few steps. He also endorses white/clear sputum production at home. Denies recent fevers, chills, cp, abd pain, n/v, leg pain, melena, hematochezia, hematemesis.  In ED: Pt was Afebrile, with wbc of 19k. CT chest with B/L Opacities and pt with elevated BNP Pt was started on BIPAP and vanc/zosyn .Sputum cx grew pseudomonas and abx were narrowed to cefepime. Sputum cx now growing mold and ID consulted.      REVIEW OF SYSTEMS  [  ] ROS unobtainable because:    [ x ] All other systems negative except as noted below    Constitutional:  [ ] fever [ ] chills  [ ] weight loss  [ ]night sweat  [ ]poor appetite/PO intake [ ]fatigue   Skin:  [ ] rash [ ] phlebitis	  Eyes: [ ] icterus [ ] pain  [ ] discharge	  ENMT: [ ] sore throat  [ ] thrush [ ] ulcers [ ] exudates [ ]anosmia  Respiratory: [ ] dyspnea [ ] hemoptysis [ ] cough [ ] sputum	  Cardiovascular:  [ ] chest pain [ ] palpitations [ ] edema	  Gastrointestinal:  [ ] nausea [ ] vomiting [ ] diarrhea [ ] constipation [ ] pain	  Genitourinary:  [ ] dysuria [ ] frequency [ ] hematuria [ ] discharge [ ] flank pain  [ ] incontinence  Musculoskeletal:  [ ] myalgias [ ] arthralgias [ ] arthritis  [ ] back pain  Neurological:  [ ] headache [ ] weakness [ ] seizures  [ ] confusion/altered mental status    prior hospital charts reviewed [V]  primary team notes reviewed [V]  other consultant notes reviewed [V]    PAST MEDICAL & SURGICAL HISTORY:  DM (diabetes mellitus)  Afib  HTN (hypertension)  H/O CHF  No significant past surgical history    SOCIAL HISTORY:  - Denied smoking/vaping/alcohol/recreational drug use    FAMILY HISTORY:  No pertinent family history in first degree relatives        Allergies  No Known Allergies        ANTIMICROBIALS:  levoFLOXacin  Tablet 750 every 48 hours      ANTIMICROBIALS (past 90 days):  MEDICATIONS  (STANDING):    cefepime   IVPB   100 mL/Hr IV Intermittent (02-12-23 @ 14:46)    cefepime   IVPB   100 mL/Hr IV Intermittent (02-16-23 @ 18:19)   100 mL/Hr IV Intermittent (02-15-23 @ 14:38)   100 mL/Hr IV Intermittent (02-14-23 @ 15:35)   100 mL/Hr IV Intermittent (02-13-23 @ 15:18)    levoFLOXacin  Tablet   750 milliGRAM(s) Oral (02-17-23 @ 07:40)    metroNIDAZOLE  IVPB   100 mL/Hr IV Intermittent (02-12-23 @ 15:06)    piperacillin/tazobactam IVPB.   200 mL/Hr IV Intermittent (02-12-23 @ 22:14)    piperacillin/tazobactam IVPB..   25 mL/Hr IV Intermittent (02-13-23 @ 07:49)    vancomycin  IVPB   250 mL/Hr IV Intermittent (02-12-23 @ 22:52)        OTHER MEDS:   MEDICATIONS  (STANDING):  albuterol/ipratropium for Nebulization 3 every 6 hours PRN  apixaban 2.5 two times a day  atorvastatin 40 at bedtime  buMETAnide 1 every 12 hours  dextrose 50% Injectable 25 once  dextrose 50% Injectable 12.5 once  dextrose 50% Injectable 25 once  dextrose Oral Gel 15 once PRN  digoxin     Tablet 125 every other day  glucagon  Injectable 1 once  insulin lispro (ADMELOG) corrective regimen sliding scale  three times a day before meals  insulin lispro (ADMELOG) corrective regimen sliding scale  at bedtime  insulin lispro Injectable (ADMELOG) 4 three times a day before meals  metoprolol tartrate 12.5 every 12 hours  spironolactone 25 daily  tamsulosin 0.8 at bedtime      VITALS:  Vital Signs Last 24 Hrs  T(F): 97.9 (02-17-23 @ 05:27), Max: 98.8 (02-12-23 @ 10:54)    Vital Signs Last 24 Hrs  HR: 77 (02-17-23 @ 05:27) (75 - 90)  BP: 103/54 (02-17-23 @ 05:27) (98/52 - 109/72)  RR: 18 (02-17-23 @ 05:27)  SpO2: 99% (02-17-23 @ 05:27) (97% - 99%)  Wt(kg): --    EXAM:    GA: NAD, AOx3  HEENT: oral cavity no lesion  CV: nl S1/S2, no RMG  Lungs: CTAB, No distress  Abd: BS+, soft, nontender, no rebounding pain  Ext: no edema  Neuro: No focal deficits  Skin: Intact  IV: no phlebitis    Labs:                        9.5    9.01  )-----------( 188      ( 16 Feb 2023 05:08 )             31.1     02-17    137  |  97<L>  |  94<H>  ----------------------------<  118<H>  3.5   |  27  |  1.81<H>    Ca    9.1      17 Feb 2023 05:18  Phos  2.9     02-17  Mg     2.20     02-17        WBC Trend:  WBC Count: 9.01 (02-16-23 @ 05:08)  WBC Count: 12.97 (02-15-23 @ 05:30)  WBC Count: 13.48 (02-14-23 @ 04:20)  WBC Count: 15.89 (02-13-23 @ 07:04)      Auto Neutrophil #: 12.68 K/uL (02-13-23 @ 07:04)  Auto Neutrophil #: 16.61 K/uL (02-12-23 @ 10:22)      Creatine Trend:  Creatinine, Serum: 1.81 (02-17)  Creatinine, Serum: 2.04 (02-16)  Creatinine, Serum: 2.47 (02-15)  Creatinine, Serum: 2.70 (02-14)      Liver Biochemical Testing Trend:  Alanine Aminotransferase (ALT/SGPT): 10 (02-13)  Alanine Aminotransferase (ALT/SGPT): 14 (02-12)  Aspartate Aminotransferase (AST/SGOT): 18 (02-13-23 @ 07:04)  Aspartate Aminotransferase (AST/SGOT): 41 (02-12-23 @ 10:22)  Bilirubin Total, Serum: 0.8 (02-13)  Bilirubin Total, Serum: 0.8 (02-12)      Trend LDH              MICROBIOLOGY:        Culture - Sputum (collected 12 Feb 2023 14:13)  Source: .Sputum Sputum  Final Report:    Mold Like Fungus Referred to Mycology    Moderate Mucin producing Pseudomonas aeruginosa    Normal Respiratory Oriana present  Organism: Pseudomonas aeruginosa  Organism: Pseudomonas aeruginosa    Sensitivities:      -  Amikacin: S <=16      -  Aztreonam: S <=4      -  Cefepime: S <=2      -  Ceftazidime: S <=1      -  Ciprofloxacin: S <=0.25      -  Gentamicin: S <=2      -  Imipenem: S <=1      -  Levofloxacin: S <=0.5      -  Meropenem: S <=1      -  Piperacillin/Tazobactam: S <=8      -  Tobramycin: S <=2      Method Type: LORENA    Culture - Urine (collected 12 Feb 2023 11:46)  Source: Clean Catch Clean Catch (Midstream)  Final Report:    <10,000 CFU/mL Normal Urogenital Oriana    Culture - Blood (collected 12 Feb 2023 10:33)  Source: .Blood Blood-Peripheral  Preliminary Report:    No growth to date.    Culture - Blood (collected 12 Feb 2023 10:20)  Source: .Blood Blood-Peripheral  Preliminary Report:    No growth to date.    Legionella Antigen, Urine: Negative (02-13 @ 17:03)  Procalcitonin, Serum: 1.29 (02-14)  Procalcitonin, Serum: 1.40 (02-12)  Serum Pro-Brain Natriuretic Peptide: 62161 (02-12)    Troponin T, High Sensitivity Result: 217 (02-13)  Troponin T, High Sensitivity Result: 215 (02-13)  Troponin T, High Sensitivity Result: 176 (02-12)  Troponin T, High Sensitivity Result: 70 (02-12)      A1C with Estimated Average Glucose Result: 7.4 % (02-13-23 @ 07:04)  A1C with Estimated Average Glucose Result: 7.7 % (02-12-23 @ 19:50)    RADIOLOGY:  imaging below personally reviewed    < from: CT Chest No Cont (02.12.23 @ 14:11) >  Secretions throughout the trachea and right main stem bronchus. Emphysema. Scattered bilateral nodular and patchy opacities  within the right upper, right middle and left upper lobes, the largest in  the right middle lobe. Emphysema with patchy bilateral lung opacities that may represent a combination of fluid overload and infectious/inflammatory process.    < end of copied text >    < from: TTE with Doppler (w/Cont) (02.13.23 @ 15:39) >  1. Mitral annular calcification, otherwise normal mitral valve. Mild mitral regurgitation. Aortic valve not well visualized. Mild aortic regurgitation. Severe global left ventricular systolic dysfunction. Estimated LVEF in the 20-25% range (by visual estimate). Endocardial visualization enhanced with intravenous injection of echo contrast (Definity).  No LV thrombus seen. Normal right ventricular size and function.  < end of copied text >   Patient is a 83y old  Male who presents with a chief complaint of CHF exacerbation (17 Feb 2023 07:17)    HPI:  is a 83 yr old Cypriot speaking male with a PMH of Ischemic Cardiomyopathy (LVEF in 2019 25%) refused Defibrillator in the past, CAD (multiple stents 5-6 last stent over 10 years ago at [facility]), T2DM, afib, CKD3, HTN, HLD , afib presenting with worsening shortness of breath since few days. Patient was hospitalized 8 Months ago for aspiration PNA at [facility]. Since being discharged he has been struggling with aspiration at home , has trouble swallowing even puree foods and water / liquids. After he eats he will cough. Over the past few weeks he has had a few episodes of aspirating at home. He began having worsening SOB past few days. He gets sob even with walking a few steps. He also endorses white/clear sputum production at home. Denies recent fevers, chills, cp, abd pain, n/v, leg pain, melena, hematochezia, hematemesis.  In ED: Pt was Afebrile, with wbc of 19k. CT chest with B/L Opacities and pt with elevated BNP Pt was started on BIPAP and vanc/zosyn .Sputum cx grew pseudomonas and abx were narrowed to cefepime. Sputum cx now growing mold and ID consulted.    SH: Born in [place]. 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 Reports he had a garden in [place] [facility], but no outdoor hobbies here. No pets. No sick contacts or recent travel. Ex-smoker, 30 pack year smoking hx      REVIEW OF SYSTEMS  [  ] ROS unobtainable because:    [ x ] All other systems negative except as noted below    Constitutional:  [ ] fever [ ] chills  [ ] weight loss  [ ]night sweat  [ ]poor appetite/PO intake [ ]fatigue   Skin:  [ ] rash [ ] phlebitis	  Eyes: [ ] icterus [ ] pain  [ ] discharge	  ENMT: [ ] sore throat  [ ] thrush [ ] ulcers [ ] exudates [ ]anosmia  Respiratory: [ ] dyspnea [ ] hemoptysis [ ] cough [ ] sputum	  Cardiovascular:  [ ] chest pain [ ] palpitations [ ] edema	  Gastrointestinal:  [ ] nausea [ ] vomiting [ ] diarrhea [ ] constipation [ ] pain	  Genitourinary:  [ ] dysuria [ ] frequency [ ] hematuria [ ] discharge [ ] flank pain  [ ] incontinence  Musculoskeletal:  [ ] myalgias [ ] arthralgias [ ] arthritis  [ ] back pain  Neurological:  [ ] headache [ ] weakness [ ] seizures  [ ] confusion/altered mental status    prior hospital charts reviewed [V]  primary team notes reviewed [V]  other consultant notes reviewed [V]    PAST MEDICAL & SURGICAL HISTORY:  DM (diabetes mellitus)  Afib  HTN (hypertension)  H/O CHF  No significant past surgical history    SOCIAL HISTORY:  - Denied smoking/vaping/alcohol/recreational drug use    FAMILY HISTORY:  No pertinent family history in first degree relatives        Allergies  No Known Allergies        ANTIMICROBIALS:  levoFLOXacin  Tablet 750 every 48 hours      ANTIMICROBIALS (past 90 days):  MEDICATIONS  (STANDING):    cefepime   IVPB   100 mL/Hr IV Intermittent (02-12-23 @ 14:46)    cefepime   IVPB   100 mL/Hr IV Intermittent (02-16-23 @ 18:19)   100 mL/Hr IV Intermittent (02-15-23 @ 14:38)   100 mL/Hr IV Intermittent (02-14-23 @ 15:35)   100 mL/Hr IV Intermittent (02-13-23 @ 15:18)    levoFLOXacin  Tablet   750 milliGRAM(s) Oral (02-17-23 @ 07:40)    metroNIDAZOLE  IVPB   100 mL/Hr IV Intermittent (02-12-23 @ 15:06)    piperacillin/tazobactam IVPB.   200 mL/Hr IV Intermittent (02-12-23 @ 22:14)    piperacillin/tazobactam IVPB..   25 mL/Hr IV Intermittent (02-13-23 @ 07:49)    vancomycin  IVPB   250 mL/Hr IV Intermittent (02-12-23 @ 22:52)        OTHER MEDS:   MEDICATIONS  (STANDING):  albuterol/ipratropium for Nebulization 3 every 6 hours PRN  apixaban 2.5 two times a day  atorvastatin 40 at bedtime  buMETAnide 1 every 12 hours  dextrose 50% Injectable 25 once  dextrose 50% Injectable 12.5 once  dextrose 50% Injectable 25 once  dextrose Oral Gel 15 once PRN  digoxin     Tablet 125 every other day  glucagon  Injectable 1 once  insulin lispro (ADMELOG) corrective regimen sliding scale  three times a day before meals  insulin lispro (ADMELOG) corrective regimen sliding scale  at bedtime  insulin lispro Injectable (ADMELOG) 4 three times a day before meals  metoprolol tartrate 12.5 every 12 hours  spironolactone 25 daily  tamsulosin 0.8 at bedtime      VITALS:  Vital Signs Last 24 Hrs  T(F): 97.9 (02-17-23 @ 05:27), Max: 98.8 (02-12-23 @ 10:54)    Vital Signs Last 24 Hrs  HR: 77 (02-17-23 @ 05:27) (75 - 90)  BP: 103/54 (02-17-23 @ 05:27) (98/52 - 109/72)  RR: 18 (02-17-23 @ 05:27)  SpO2: 99% (02-17-23 @ 05:27) (97% - 99%)  Wt(kg): --    EXAM:    GA: NAD, AOx3  HEENT: oral cavity no lesion  CV: nl S1/S2, no RMG  Lungs: Significant b/l crackles  Abd: BS+, soft, nontender, no rebounding pain  Ext: no edema  Neuro: No focal deficits  Skin: Intact  IV: no phlebitis    Labs:                        9.5    9.01  )-----------( 188      ( 16 Feb 2023 05:08 )             31.1     02-17    137  |  97<L>  |  94<H>  ----------------------------<  118<H>  3.5   |  27  |  1.81<H>    Ca    9.1      17 Feb 2023 05:18  Phos  2.9     02-17  Mg     2.20     02-17        WBC Trend:  WBC Count: 9.01 (02-16-23 @ 05:08)  WBC Count: 12.97 (02-15-23 @ 05:30)  WBC Count: 13.48 (02-14-23 @ 04:20)  WBC Count: 15.89 (02-13-23 @ 07:04)      Auto Neutrophil #: 12.68 K/uL (02-13-23 @ 07:04)  Auto Neutrophil #: 16.61 K/uL (02-12-23 @ 10:22)      Creatine Trend:  Creatinine, Serum: 1.81 (02-17)  Creatinine, Serum: 2.04 (02-16)  Creatinine, Serum: 2.47 (02-15)  Creatinine, Serum: 2.70 (02-14)      Liver Biochemical Testing Trend:  Alanine Aminotransferase (ALT/SGPT): 10 (02-13)  Alanine Aminotransferase (ALT/SGPT): 14 (02-12)  Aspartate Aminotransferase (AST/SGOT): 18 (02-13-23 @ 07:04)  Aspartate Aminotransferase (AST/SGOT): 41 (02-12-23 @ 10:22)  Bilirubin Total, Serum: 0.8 (02-13)  Bilirubin Total, Serum: 0.8 (02-12)      Trend LDH              MICROBIOLOGY:        Culture - Sputum (collected 12 Feb 2023 14:13)  Source: .Sputum Sputum  Final Report:    Mold Like Fungus Referred to Mycology    Moderate Mucin producing Pseudomonas aeruginosa    Normal Respiratory Oriana present  Organism: Pseudomonas aeruginosa  Organism: Pseudomonas aeruginosa    Sensitivities:      -  Amikacin: S <=16      -  Aztreonam: S <=4      -  Cefepime: S <=2      -  Ceftazidime: S <=1      -  Ciprofloxacin: S <=0.25      -  Gentamicin: S <=2      -  Imipenem: S <=1      -  Levofloxacin: S <=0.5      -  Meropenem: S <=1      -  Piperacillin/Tazobactam: S <=8      -  Tobramycin: S <=2      Method Type: LORENA    Culture - Urine (collected 12 Feb 2023 11:46)  Source: Clean Catch Clean Catch (Midstream)  Final Report:    <10,000 CFU/mL Normal Urogenital Oriana    Culture - Blood (collected 12 Feb 2023 10:33)  Source: .Blood Blood-Peripheral  Preliminary Report:    No growth to date.    Culture - Blood (collected 12 Feb 2023 10:20)  Source: .Blood Blood-Peripheral  Preliminary Report:    No growth to date.    Legionella Antigen, Urine: Negative (02-13 @ 17:03)  Procalcitonin, Serum: 1.29 (02-14)  Procalcitonin, Serum: 1.40 (02-12)  Serum Pro-Brain Natriuretic Peptide: 04824 (02-12)    Troponin T, High Sensitivity Result: 217 (02-13)  Troponin T, High Sensitivity Result: 215 (02-13)  Troponin T, High Sensitivity Result: 176 (02-12)  Troponin T, High Sensitivity Result: 70 (02-12)      A1C with Estimated Average Glucose Result: 7.4 % (02-13-23 @ 07:04)  A1C with Estimated Average Glucose Result: 7.7 % (02-12-23 @ 19:50)    RADIOLOGY:  imaging below personally reviewed    < from: CT Chest No Cont (02.12.23 @ 14:11) >  Secretions throughout the trachea and right main stem bronchus. Emphysema. Scattered bilateral nodular and patchy opacities  within the right upper, right middle and left upper lobes, the largest in  the right middle lobe. Emphysema with patchy bilateral lung opacities that may represent a combination of fluid overload and infectious/inflammatory process.    < end of copied text >    < from: TTE with Doppler (w/Cont) (02.13.23 @ 15:39) >  1. Mitral annular calcification, otherwise normal mitral valve. Mild mitral regurgitation. Aortic valve not well visualized. Mild aortic regurgitation. Severe global left ventricular systolic dysfunction. Estimated LVEF in the 20-25% range (by visual estimate). Endocardial visualization enhanced with intravenous injection of echo contrast (Definity).  No LV thrombus seen. Normal right ventricular size and function.  < end of copied text >   Patient is a 83y old  Male who presents with a chief complaint of CHF exacerbation (17 Feb 2023 07:17)    HPI:  is a 83 yr old [place]ym[place] speaking male with a PMH of Ischemic Cardiomyopathy (LVEF in 2019 25%) refused Defibrillator in the past, CAD (multiple stents 5-6 last stent over 10 years ago at [facility]), T2DM, afib, CKD3, HTN, HLD , afib presenting with worsening shortness of breath since few days. Patient was hospitalized 8 Months ago for aspiration PNA at [facility]. Since being discharged he has been struggling with aspiration at home , has trouble swallowing even puree foods and water / liquids. After he eats he will cough. Over the past few weeks he has had a few episodes of aspirating at home. He began having worsening SOB past few days. He gets sob even with walking a few steps. He also endorses white/clear sputum production at home. Denies recent fevers, chills, cp, abd pain, n/v, leg pain, melena, hematochezia, hematemesis.  In ED: Pt was Afebrile, with wbc of 19k. CT chest with B/L Opacities and pt with elevated BNP Pt was started on BIPAP and vanc/zosyn .Sputum cx grew pseudomonas and abx were narrowed to cefepime. Sputum cx now growing mold and ID consulted.    SH: Born in [facility] [place] [facility] [place]. 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 Reports he had a garden in [place], but no outdoor hobbies here. No pets. No sick contacts or recent travel. Ex-smoker, 30 pack year smoking hx      REVIEW OF SYSTEMS  [  ] ROS unobtainable because:    [ x ] All other systems negative except as noted below    Constitutional:  [ ] fever [ ] chills  [ ] weight loss  [ ]night sweat  [ ]poor appetite/PO intake [ ]fatigue   Skin:  [ ] rash [ ] phlebitis	  Eyes: [ ] icterus [ ] pain  [ ] discharge	  ENMT: [ ] sore throat  [ ] thrush [ ] ulcers [ ] exudates [ ]anosmia  Respiratory: [ ] dyspnea [ ] hemoptysis [ ] cough [ ] sputum	  Cardiovascular:  [ ] chest pain [ ] palpitations [ ] edema	  Gastrointestinal:  [ ] nausea [ ] vomiting [ ] diarrhea [ ] constipation [ ] pain	  Genitourinary:  [ ] dysuria [ ] frequency [ ] hematuria [ ] discharge [ ] flank pain  [ ] incontinence  Musculoskeletal:  [ ] myalgias [ ] arthralgias [ ] arthritis  [ ] back pain  Neurological:  [ ] headache [ ] weakness [ ] seizures  [ ] confusion/altered mental status    prior hospital charts reviewed [V]  primary team notes reviewed [V]  other consultant notes reviewed [V]    PAST MEDICAL & SURGICAL HISTORY:  DM (diabetes mellitus)  Afib  HTN (hypertension)  H/O CHF  No significant past surgical history    SOCIAL HISTORY:  - Denied smoking/vaping/alcohol/recreational drug use    FAMILY HISTORY:  No pertinent family history in first degree relatives        Allergies  No Known Allergies        ANTIMICROBIALS:  levoFLOXacin  Tablet 750 every 48 hours      ANTIMICROBIALS (past 90 days):  MEDICATIONS  (STANDING):    cefepime   IVPB   100 mL/Hr IV Intermittent (02-12-23 @ 14:46)    cefepime   IVPB   100 mL/Hr IV Intermittent (02-16-23 @ 18:19)   100 mL/Hr IV Intermittent (02-15-23 @ 14:38)   100 mL/Hr IV Intermittent (02-14-23 @ 15:35)   100 mL/Hr IV Intermittent (02-13-23 @ 15:18)    levoFLOXacin  Tablet   750 milliGRAM(s) Oral (02-17-23 @ 07:40)    metroNIDAZOLE  IVPB   100 mL/Hr IV Intermittent (02-12-23 @ 15:06)    piperacillin/tazobactam IVPB.   200 mL/Hr IV Intermittent (02-12-23 @ 22:14)    piperacillin/tazobactam IVPB..   25 mL/Hr IV Intermittent (02-13-23 @ 07:49)    vancomycin  IVPB   250 mL/Hr IV Intermittent (02-12-23 @ 22:52)        OTHER MEDS:   MEDICATIONS  (STANDING):  albuterol/ipratropium for Nebulization 3 every 6 hours PRN  apixaban 2.5 two times a day  atorvastatin 40 at bedtime  buMETAnide 1 every 12 hours  dextrose 50% Injectable 25 once  dextrose 50% Injectable 12.5 once  dextrose 50% Injectable 25 once  dextrose Oral Gel 15 once PRN  digoxin     Tablet 125 every other day  glucagon  Injectable 1 once  insulin lispro (ADMELOG) corrective regimen sliding scale  three times a day before meals  insulin lispro (ADMELOG) corrective regimen sliding scale  at bedtime  insulin lispro Injectable (ADMELOG) 4 three times a day before meals  metoprolol tartrate 12.5 every 12 hours  spironolactone 25 daily  tamsulosin 0.8 at bedtime      VITALS:  Vital Signs Last 24 Hrs  T(F): 97.9 (02-17-23 @ 05:27), Max: 98.8 (02-12-23 @ 10:54)    Vital Signs Last 24 Hrs  HR: 77 (02-17-23 @ 05:27) (75 - 90)  BP: 103/54 (02-17-23 @ 05:27) (98/52 - 109/72)  RR: 18 (02-17-23 @ 05:27)  SpO2: 99% (02-17-23 @ 05:27) (97% - 99%)  Wt(kg): --    EXAM:    GA: NAD, AOx3, nasal O2  HEENT: oral cavity no lesion  CV: nl S1/S2, no RMG  Lungs: Significant b/l crackles  Abd: BS+, soft, nontender, no rebounding pain  Ext: no edema  Neuro: No focal deficits  Skin: Intact  IV: no phlebitis    Labs:                        9.5    9.01  )-----------( 188      ( 16 Feb 2023 05:08 )             31.1     02-17    137  |  97<L>  |  94<H>  ----------------------------<  118<H>  3.5   |  27  |  1.81<H>    Ca    9.1      17 Feb 2023 05:18  Phos  2.9     02-17  Mg     2.20     02-17        WBC Trend:  WBC Count: 9.01 (02-16-23 @ 05:08)  WBC Count: 12.97 (02-15-23 @ 05:30)  WBC Count: 13.48 (02-14-23 @ 04:20)  WBC Count: 15.89 (02-13-23 @ 07:04)      Auto Neutrophil #: 12.68 K/uL (02-13-23 @ 07:04)  Auto Neutrophil #: 16.61 K/uL (02-12-23 @ 10:22)      Creatine Trend:  Creatinine, Serum: 1.81 (02-17)  Creatinine, Serum: 2.04 (02-16)  Creatinine, Serum: 2.47 (02-15)  Creatinine, Serum: 2.70 (02-14)      Liver Biochemical Testing Trend:  Alanine Aminotransferase (ALT/SGPT): 10 (02-13)  Alanine Aminotransferase (ALT/SGPT): 14 (02-12)  Aspartate Aminotransferase (AST/SGOT): 18 (02-13-23 @ 07:04)  Aspartate Aminotransferase (AST/SGOT): 41 (02-12-23 @ 10:22)  Bilirubin Total, Serum: 0.8 (02-13)  Bilirubin Total, Serum: 0.8 (02-12)      Trend LDH              MICROBIOLOGY:        Culture - Sputum (collected 12 Feb 2023 14:13)  Source: .Sputum Sputum  Final Report:    Mold Like Fungus Referred to Mycology    Moderate Mucin producing Pseudomonas aeruginosa    Normal Respiratory Oriana present  Organism: Pseudomonas aeruginosa  Organism: Pseudomonas aeruginosa    Sensitivities:      -  Amikacin: S <=16      -  Aztreonam: S <=4      -  Cefepime: S <=2      -  Ceftazidime: S <=1      -  Ciprofloxacin: S <=0.25      -  Gentamicin: S <=2      -  Imipenem: S <=1      -  Levofloxacin: S <=0.5      -  Meropenem: S <=1      -  Piperacillin/Tazobactam: S <=8      -  Tobramycin: S <=2      Method Type: LORENA    Culture - Urine (collected 12 Feb 2023 11:46)  Source: Clean Catch Clean Catch (Midstream)  Final Report:    <10,000 CFU/mL Normal Urogenital Oriana    Culture - Blood (collected 12 Feb 2023 10:33)  Source: .Blood Blood-Peripheral  Preliminary Report:    No growth to date.    Culture - Blood (collected 12 Feb 2023 10:20)  Source: .Blood Blood-Peripheral  Preliminary Report:    No growth to date.    Legionella Antigen, Urine: Negative (02-13 @ 17:03)  Procalcitonin, Serum: 1.29 (02-14)  Procalcitonin, Serum: 1.40 (02-12)  Serum Pro-Brain Natriuretic Peptide: 26871 (02-12)    Troponin T, High Sensitivity Result: 217 (02-13)  Troponin T, High Sensitivity Result: 215 (02-13)  Troponin T, High Sensitivity Result: 176 (02-12)  Troponin T, High Sensitivity Result: 70 (02-12)      A1C with Estimated Average Glucose Result: 7.4 % (02-13-23 @ 07:04)  A1C with Estimated Average Glucose Result: 7.7 % (02-12-23 @ 19:50)    RADIOLOGY:  imaging below personally reviewed    < from: CT Chest No Cont (02.12.23 @ 14:11) >  Secretions throughout the trachea and right main stem bronchus. Emphysema. Scattered bilateral nodular and patchy opacities  within the right upper, right middle and left upper lobes, the largest in  the right middle lobe. Emphysema with patchy bilateral lung opacities that may represent a combination of fluid overload and infectious/inflammatory process.    < end of copied text >    < from: TTE with Doppler (w/Cont) (02.13.23 @ 15:39) >  1. Mitral annular calcification, otherwise normal mitral valve. Mild mitral regurgitation. Aortic valve not well visualized. Mild aortic regurgitation. Severe global left ventricular systolic dysfunction. Estimated LVEF in the 20-25% range (by visual estimate). Endocardial visualization enhanced with intravenous injection of echo contrast (Definity).  No LV thrombus seen. Normal right ventricular size and function.  < end of copied text >

## 2023-02-17 NOTE — PROGRESS NOTE ADULT - PROBLEM SELECTOR PLAN 5
On admission with elevated troponin of 70 but without chest pain. With increase in trops but peaked and downtrending at 215. Etiology likely demand ischemia secondary to afib RVR, CHF exacerbation and copd exacerbation (although less likely)  - low suspicion for ACS   - Cardiology recommendations appreciated  - nuclear stress test with diffuse areas of ischemia EKG on admission shows afib RVR.  - c/w eliquis  - c/w digoxin 125 mcg q48 hours  - c/w metoprolol tartrate 12.5 BID as patient is refusing metoprolol succinate 25 QD

## 2023-02-17 NOTE — PROGRESS NOTE ADULT - PROBLEM SELECTOR PLAN 9
Takes Lopressor 12.5 BID PO.  - c/w Lopressor 12.5 mg PO BID as patient and son refusing optimization with metoprolol succinate 25 QD DVT ppx: Heparin sq   Diet: Pureed with thin liquids, consistent carbohydrate  Dispo: to home when optimized

## 2023-02-17 NOTE — PROGRESS NOTE ADULT - PROBLEM SELECTOR PLAN 4
Patient with history of COPD with reported wheezing on admission. Appears that this is less likely contributing to his current disease state.  - s/p solumedrol 40mg in ED and prednisone 40 x 1 on floors  - will monitor off steroids due to lower suspicion for COPD exacerbation   - Will change DuoNebs to q.6.h. p.r.n. shortness of breath/wheezing  - incentive spirometer On admission with elevated troponin of 70 but without chest pain. With increase in trops but peaked and downtrending at 215. Etiology likely demand ischemia secondary to afib RVR, CHF exacerbation and copd exacerbation (although less likely)  - low suspicion for ACS   - Cardiology recommendations appreciated  - nuclear stress test with diffuse areas of ischemia

## 2023-02-17 NOTE — PROGRESS NOTE ADULT - PROBLEM SELECTOR PLAN 7
Presenting with Cr: 2.78 with slight hyperkalemia 5.8 on admission. Baseline SCr 2.2 in 10/2022. Presentation appears to be ZACHARY on CKD  - Drew catheter removed and patient passed TOV  - Strict I&O  - resolved Initially reported that patient takes Humalog 25U BID at home however, per family insulin regimen is not steady.  - lantus 10U and admelog 8U premeal with SSI  - will require daily titration of insulin regimen  - patient son refusing optimization of diabetes regimen so titrating per their request  - CC diet

## 2023-02-17 NOTE — PROGRESS NOTE ADULT - SUBJECTIVE AND OBJECTIVE BOX
Patient seen and examined at bedside.    83M with a PMH of Ischemic Cardiomyopathy (LVEF in 2019 25%) refused Defibrillator in the past, CAD (multiple stents 5-6 last stent over 10 years ago at [facility]), T2DM, Bronchiectasis?, CKD3, HTN, HLD presenting with worsening shortness of breath. Cardiology consulted for elevated troponin      Overnight Events:     Review Of Systems: No chest pain, shortness of breath, or palpitations            Current Meds:  albuterol/ipratropium for Nebulization 3 milliLiter(s) Nebulizer every 6 hours PRN  apixaban 2.5 milliGRAM(s) Oral two times a day  atorvastatin 40 milliGRAM(s) Oral at bedtime  buMETAnide 1 milliGRAM(s) Oral every 12 hours  dextrose 5%. 1000 milliLiter(s) IV Continuous <Continuous>  dextrose 5%. 1000 milliLiter(s) IV Continuous <Continuous>  dextrose 50% Injectable 25 Gram(s) IV Push once  dextrose 50% Injectable 12.5 Gram(s) IV Push once  dextrose 50% Injectable 25 Gram(s) IV Push once  dextrose Oral Gel 15 Gram(s) Oral once PRN  digoxin     Tablet 125 MICROGram(s) Oral every other day  glucagon  Injectable 1 milliGRAM(s) IntraMuscular once  insulin lispro (ADMELOG) corrective regimen sliding scale   SubCutaneous three times a day before meals  insulin lispro (ADMELOG) corrective regimen sliding scale   SubCutaneous at bedtime  insulin lispro Injectable (ADMELOG) 4 Unit(s) SubCutaneous three times a day before meals  levoFLOXacin  Tablet 750 milliGRAM(s) Oral every 48 hours  metoprolol tartrate 12.5 milliGRAM(s) Oral every 12 hours  spironolactone 25 milliGRAM(s) Oral daily  tamsulosin 0.8 milliGRAM(s) Oral at bedtime      Vitals:  T(F): 97.9 (02-17), Max: 98.1 (02-16)  HR: 77 (02-17) (75 - 90)  BP: 103/54 (02-17) (98/52 - 109/72)  RR: 18 (02-17)  SpO2: 99% (02-17)  I&O's Summary    16 Feb 2023 07:01  -  17 Feb 2023 07:00  --------------------------------------------------------  IN: 480 mL / OUT: 550 mL / NET: -70 mL        Physical Exam:  Appearance: No acute distress; well appearing  Eyes: PERRL, EOMI, pink conjunctiva  HEENT: Normal oral mucosa  Cardiovascular: RRR, S1, S2, no murmurs, rubs, or gallops; no edema; no JVD  Respiratory: Clear to auscultation bilaterally  Gastrointestinal: soft, non-tender, non-distended with normal bowel sounds  Musculoskeletal: No clubbing; no joint deformity   Neurologic: Non-focal  Lymphatic: No lymphadenopathy  Psychiatry: AAOx3, mood & affect appropriate  Skin: No rashes, ecchymoses, or cyanosis                          9.5    9.01  )-----------( 188      ( 16 Feb 2023 05:08 )             31.1     02-16    138  |  98  |  99<H>  ----------------------------<  165<H>  3.9   |  27  |  2.04<H>    Ca    9.1      16 Feb 2023 05:08  Phos  3.7     02-16  Mg     2.30     02-16        CARDIAC MARKERS ( 13 Feb 2023 06:30 )  217 ng/L / x     / x     / 67 U/L / x     / 8.8 ng/mL  CARDIAC MARKERS ( 12 Feb 2023 20:13 )  176 ng/L / x     / x     / 103 U/L / x     / 13.4 ng/mL  CARDIAC MARKERS ( 12 Feb 2023 10:22 )  70 ng/L / x     / x     / x     / x     / x          Serum Pro-Brain Natriuretic Peptide: 76738 pg/mL (02-12 @ 10:22)          New ECG(s): Personally reviewed    Echo:    Patient name: KATINA YUSUF  YOB: 1940   Age: 83 (M)   MR#: [id number]  Study Date: 2/13/2023  [facility]  Study quality: Technically Difficult  Referring Physician: Terri Brand MD  Blood Pressure: 99/51 mmHg  Height: 170 cm  Weight: 59 kg  BSA: 1.7 m2  ------------------------------------------------------------------------  PROCEDURE: Transthoracic echocardiogram with 2-D, M-Mode  and complete spectral and color flow Doppler.  Intravenous ultrasound enhancing agent was administered for  improved left ventricular endocardial border definition.  Following the intravenous injection of ultrasound enhancing  agent, harmonic imaging was performed.  INDICATION: Heart failure, unspecified (I50.9)  ------------------------------------------------------------------------  OBSERVATIONS:  Mitral Valve: Mitral annular calcification, otherwise  normal mitral valve. Mild mitral regurgitation.  Aortic Root: Aortic root not well visualized.  Aortic Valve: Aortic valve not well visualized. Mild aortic  regurgitation.  Left Atrium: Normal left atrium.  LA volume index = 24  cc/m2.  Left Ventricle: Severe global left ventricular systolic  dysfunction.  Estimated LVEF in the 20-25% range (by visual  estimate).  Endocardial visualization enhanced with  intravenous injection of echo contrast (Definity).  No LV  thrombus seen.  Right Heart: Normal right atrium. Normal right ventricular  size and function. Normal tricuspid valve. Minimal  tricuspid regurgitation. Pulmonic valve not well  visualized.  Pericardium/PleuraNormal pericardium with no pericardial  effusion.  ------------------------------------------------------------------------  CONCLUSIONS:  1. Mitral annular calcification, otherwise normal mitral  valve. Mild mitral regurgitation.  2. Aortic valve not well visualized. Mild aortic  regurgitation.  3. Severe global left ventricular systolic dysfunction.  Estimated LVEF in the 20-25% range (by visual estimate).  Endocardial visualization enhanced with intravenous  injection of echo contrast (Definity).  No LV thrombus  seen.  4. Normal right ventricular size and function.  *** No previous Echo exam.  ------------------------------------------------------------------------  Confirmed on  2/13/2023 - 16:54:20 by Otto Oliveros M.D.,  [facility]  ------------------------------------------------------------------------      Stress Testing:     IMPRESSIONS:Abnormal Study  * Myocardial Perfusion SPECT results are abnormal.  * Review of raw data shows: The study is of good technical  quality.  * The left ventricle was markdely dilated at baseline.  There are large, severe defects in anterior and  anteroseptal, apical walls that are fixed, suggestive of  infarct.There is a medium sized, severe defect in proximal  to mid septal wall that is fixed, suggestive of infarct.  The best perfusion was in the lateral / inferolateral  wall. There was increased RV tracer uptake.  * Post-stress gated wall motion analysis was performed  (LVEF = 22 %;LVEDV = 190 ml.), revealing severe overall  hypokinesis. There was apical, anterior and anteroseptal  akinesis. There was proximal to mid septal akinesis. The  best motion was in the lateral and inferolateral walls. RV  size and function appeared normal.  ------------------------------------------------------------------------  Confirmed on  2/16/2023 - 18:22:21 by Gerardo Weldon M.D.  ------------------------------------------------------------------------    Cath:    Imaging:    Interpretation of Telemetry:

## 2023-02-17 NOTE — PROGRESS NOTE ADULT - PROBLEM SELECTOR PLAN 1
Having aspirations at home and was hospitalized 8 months ago for ASP PNA. CT chest with bilateral opacities c/f PNA with leukocytosis 19K. Sputum Gram stain positive for Pseudomonas.  - s/p cefepime, flagyll and vancomycin in ED  - s/p cefepime (2/12 - 2/16); will continue on oral therapy with levofloxacin 500 q48 for renal dosing x 2 days given Pseudomonas for total 7 day course  - aspiration precautions  - will c/w diet per S/S recs Having aspirations at home and was hospitalized 8 months ago for ASP PNA. CT chest with bilateral opacities c/f PNA with leukocytosis 19K. Sputum Gram stain positive for Pseudomonas.  - s/p cefepime, flagyll and vancomycin in ED  - s/p cefepime (2/12 - 2/16); will continue on oral therapy with levofloxacin 500 q48 for renal dosing x 2 days given Pseudomonas for total 7 day course  - aspiration precautions  - will c/w diet per S/S recs  - pending ID eval for moldy growth noted in sputum cultures

## 2023-02-17 NOTE — PROGRESS NOTE ADULT - PROBLEM SELECTOR PLAN 2
Presenting with bilateral pleural effusions with BNP 11K iso of PNA. Unclear if compliant on medications in the outpatient setting. TTE 2019 LVEF 25% refusing defibrillator and optimization of medications.   - c/w home bumex 1 mg PO BID   - Strict I&Os and daily weights   - TTE 2/13 with EF 20-25%   - patient refusing spironolactone and optimization of metoprolol dosing  - c/w metoprolol tartrate (home med) 12.5 BID Presenting with bilateral pleural effusions with BNP 11K iso of PNA. Unclear if compliant on medications in the outpatient setting. TTE 2019 LVEF 25% refusing defibrillator and optimization of medications.   - c/w home bumex 1 mg PO BID   - Strict I&Os and daily weights   - TTE 2/13 with EF 20-25%   - patient refusing spironolactone and optimization of metoprolol dosing  - c/w metoprolol tartrate (home med) 12.5 BID  - patient on baseline 2L NC

## 2023-02-17 NOTE — PROGRESS NOTE ADULT - PROBLEM SELECTOR PLAN 3
Acute hypercarbic and hypoxic respiratory failure to 70s via EMS placed in BIPAP. On admission with pH 7.17 and pCO2 80s concerning for retention.   - Supplemental oxygen to maintain saturation > 92%  - Treatment of underlying causes as described below  - improved Patient with history of COPD with reported wheezing on admission. Appears that this is less likely contributing to his current disease state.  - s/p solumedrol 40mg in ED and prednisone 40 x 1 on floors  - will monitor off steroids due to lower suspicion for COPD exacerbation   - Will change DuoNebs to q.6.h. p.r.n. shortness of breath/wheezing  - incentive spirometer

## 2023-02-17 NOTE — CHART NOTE - NSCHARTNOTEFT_GEN_A_CORE
I called the patient's son and told him about results of the stress test and stressed medication compliance.   Please obtain appointment for the patient with cardiology by calling [phone].    Cardiology will signoff at this time, please call with any questions.     Chivo Weinstein MD  Cardiology fellow

## 2023-02-17 NOTE — CHART NOTE - NSCHARTNOTEFT_GEN_A_CORE
Pts son was refusing 10 units lantus at bedtime. Spoke to son ([phone]) on the phone who states that he thinks this dose is too high for his father given the recent FS of 206. Explained to son that lantus will provide basal coverage and that pt may have elevated FS during the day tomorrow if he didn't get his dose. Son was only agreeable to giving 5 units lantus at this time.

## 2023-02-17 NOTE — CONSULT NOTE ADULT - ASSESSMENT
WORKUP  Sputum cx: Pansensitive Mucin producing pseudomonas. Mold like fungus  CT Chest No Cont (02.12): Secretions throughout the trachea and right main stem bronchus. Emphysema. Scattered bilateral nodular and patchy opacities  within the right upper, right middle and left upper lobes, the largest in  the right middle lobe.   TTE with Doppler (w/Cont) (02.13.23 ): Severe global left ventricular systolic dysfunction. Estimated LVEF in the 20-25% range   BNP: 98613 (02-12)  Procal: 1.4  Blood cx, Urine cx, Ur legionella: Negative    DIAGNOSIS and IMPRESSION  ·	Pneumonia  ·	Acute Hypoxic Respiratory failure 2/2 PNA, CHF and COPD exacerbation  ·	DM (A1C 7.4)    Afebrile with leukocytosis to 15k on admission that has resolved  S/p Vanc (2/12), Flagyl (2/12), Zosyn (2/12 - 2/13) Cefepime (2/12 - 2/16)  Currently on levaquin 750mg Q48 (2/17 - )    RECOMMENDATIONS        PT TO BE SEEN. PRELIM NOTE  PENDING RECS. PLEASE WAIT FOR FINAL RECS AFTER DISCUSSION WITH ATTENDINGFlora Navarrete MD, PGY5  ID fellow  [employer] Teams Preferred  After 5pm/weekends call [phone]     is a 83 yr old Nigerian speaking male with a PMH of Ischemic Cardiomyopathy (LVEF in 2019 25%) refused Defibrillator in the past, CAD (multiple stents 5-6 last stent over 10 years ago at [facility]), T2DM, afib, CKD3, HTN, HLD , afib presenting with worsening shortness of breath since few days. Patient was hospitalized 8 Months ago for aspiration PNA at [facility]. Since being discharged he has been struggling with aspiration at home , has trouble swallowing even puree foods and water / liquids. After he eats he will cough. Over the past few weeks he has had a few episodes of aspirating at home. He began having worsening SOB past few days. He gets sob even with walking a few steps. He also endorses white/clear sputum production at home. Denies recent fevers, chills, cp, abd pain, n/v, leg pain, melena, hematochezia, hematemesis.  In ED: Pt was Afebrile, with wbc of 19k. CT chest with B/L Opacities and pt with elevated BNP Pt was started on BIPAP and vanc/zosyn .Sputum cx grew pseudomonas and abx were narrowed to cefepime. Sputum cx now growing mold and ID consulted.    WORKUP  Sputum cx: Pansensitive Mucin producing pseudomonas. Mold like fungus  CT Chest No Cont (02.12): Secretions throughout the trachea and right main stem bronchus. Emphysema. Scattered bilateral nodular and patchy opacities  within the right upper, right middle and left upper lobes, the largest in  the right middle lobe.   TTE with Doppler (w/Cont) (02.13.23 ): Severe global left ventricular systolic dysfunction. Estimated LVEF in the 20-25% range   BNP: 24546 (02-12)  Procal: 1.4  Blood cx, Urine cx, Ur legionella: Negative    DIAGNOSIS and IMPRESSION  ·	Pneumonia  ·	Acute Hypoxic Respiratory failure 2/2 PNA, CHF and COPD exacerbation  ·	DM (A1C 7.4)    Afebrile with leukocytosis to 15k on admission that has resolved  S/p Vanc (2/12), Flagyl (2/12), Zosyn (2/12 - 2/13) Cefepime (2/12 - 2/16)  Currently on levaquin 750mg Q48 (2/17 - )    RECOMMENDATIONS        PT TO BE SEEN. PRELIM NOTE  PENDING RECS. PLEASE WAIT FOR FINAL RECS AFTER DISCUSSION WITH ATTENDING#    Sergio Navarrete MD, PGY5  ID fellow  [employer] Teams Preferred  After 5pm/weekends call [phone]     is a 83 yr old Mexican speaking male with a PMH of Ischemic Cardiomyopathy (LVEF in 2019 25%) refused Defibrillator in the past, CAD (multiple stents 5-6 last stent over 10 years ago at [facility]), T2DM, afib, CKD3, HTN, HLD , afib presenting with worsening shortness of breath since few days. Patient was hospitalized 8 Months ago for aspiration PNA at [facility]. Since being discharged he has been struggling with aspiration at home , has trouble swallowing even puree foods and water / liquids. After he eats he will cough. Over the past few weeks he has had a few episodes of aspirating at home. He began having worsening SOB past few days. He gets sob even with walking a few steps. He also endorses white/clear sputum production at home. Denies recent fevers, chills, cp, abd pain, n/v, leg pain, melena, hematochezia, hematemesis.  In ED: Pt was Afebrile, with wbc of 19k. CT chest with B/L Opacities and pt with elevated BNP Pt was started on BIPAP and vanc/zosyn .Sputum cx grew pseudomonas and abx were narrowed to cefepime. Sputum cx now growing mold and ID consulted.    WORKUP  Sputum cx: Pansensitive Mucin producing pseudomonas. Mold like fungus  CT Chest No Cont (02.12): Secretions throughout the trachea and right main stem bronchus. Emphysema. Scattered bilateral nodular and patchy opacities  within the right upper, right middle and left upper lobes, the largest in  the right middle lobe.   TTE with Doppler (w/Cont) (02.13.23 ): Severe global left ventricular systolic dysfunction. Estimated LVEF in the 20-25% range   BNP: 86926 (02-12)  Procal: 1.4  Blood cx, Urine cx, Ur legionella: Negative    DIAGNOSIS and IMPRESSION  ·	Pneumonia  ·	Acute Hypoxic Respiratory failure 2/2 PNA, CHF and COPD exacerbation  ·	DM (A1C 7.4)    Afebrile with leukocytosis to 15k on admission that has resolved  S/p Vanc (2/12), Flagyl (2/12), Zosyn (2/12 - 2/13) Cefepime (2/12 - 2/16)  Currently on levaquin 750mg Q48 (2/17 - )  CT chest with B/L Nodular opacities, no prior imaging to compare to    RECOMMENDATIONS        PT TO BE SEEN. PRELIM NOTE  PENDING RECS. PLEASE WAIT FOR FINAL RECS AFTER DISCUSSION WITH ATTENDING#    Sergio Navarrete MD, PGY5  ID fellow  [employer] Teams Preferred  After 5pm/weekends call [phone]     is a 83 yr old Jamaican speaking male with a PMH of Ischemic Cardiomyopathy (LVEF in 2019 25%) refused Defibrillator in the past, CAD (multiple stents 5-6 last stent over 10 years ago at [facility]), T2DM, afib, CKD3, HTN, HLD , afib presenting with worsening shortness of breath since few days. Patient was hospitalized 8 Months ago for aspiration PNA at [facility]. Since being discharged he has been struggling with aspiration at home , has trouble swallowing even puree foods and water / liquids. After he eats he will cough. Over the past few weeks he has had a few episodes of aspirating at home. He began having worsening SOB past few days. He gets sob even with walking a few steps. He also endorses white/clear sputum production at home. Denies recent fevers, chills, cp, abd pain, n/v, leg pain, melena, hematochezia, hematemesis.  In ED: Pt was Afebrile, with wbc of 19k. CT chest with B/L Opacities and pt with elevated BNP Pt was started on BIPAP and vanc/zosyn .Sputum cx grew pseudomonas and abx were narrowed to cefepime. Sputum cx now growing mold and ID consulted.    WORKUP  Sputum cx: Pansensitive Mucin producing pseudomonas. Mold like fungus  CT Chest No Cont (02.12): Secretions throughout the trachea and right main stem bronchus. Emphysema. Scattered bilateral nodular and patchy opacities  within the right upper, right middle and left upper lobes, the largest in  the right middle lobe.   TTE with Doppler (w/Cont) (02.13.23 ): Severe global left ventricular systolic dysfunction. Estimated LVEF in the 20-25% range   BNP: 01505 (02-12)  Procal: 1.4  Blood cx, Urine cx, Ur legionella: Negative    DIAGNOSIS and IMPRESSION  ·	Pneumonia  ·	Acute Hypoxic Respiratory failure 2/2 PNA, CHF and COPD exacerbation  ·	DM (A1C 7.4)    Afebrile with leukocytosis to 15k on admission that has resolved  S/p Vanc (2/12), Flagyl (2/12), Zosyn (2/12 - 2/13) Cefepime (2/12 - 2/16)  Currently on levaquin 750mg Q48 (2/17 - )  CT chest with B/L Nodular opacities, no prior imaging to compare to    RECOMMENDATIONS  [employer] report shows sputum also with mold in march 2022 -> Please get full report from [facility]  Pt appears to have clinically improved on abx -> C/w levaquin to complete a 7 day course  Pt reporting difficulty swallowing large leavaquin pill -> Can either crush pill or give him IV  Please send galactomannan and fungitell  F.u mold ID -> If pt has clinically improved, can f/u with his out pt pulm or with  regarding f/u possible treatment for mold in sputum  Pt also with significant pain over left ankle on dependant part -> Recommend offloading    Pt seen and examined. Case d/w attending   Recommendation provided to primary team via MS Teams.      Sergio Navarrete MD, PGY5  ID fellow  [employer] Teams Preferred  After 5pm/weekends call [phone]     is a 83 yr old Nepalese speaking male with a PMH of Ischemic Cardiomyopathy (LVEF in 2019 25%) refused Defibrillator in the past, CAD (multiple stents 5-6 last stent over 10 years ago at [facility]), T2DM, afib, CKD3, HTN, HLD , afib presenting with worsening shortness of breath since few days. Patient was hospitalized 8 Months ago for aspiration PNA at [facility]. Since being discharged he has been struggling with aspiration at home , has trouble swallowing even puree foods and water / liquids. After he eats he will cough. Over the past few weeks he has had a few episodes of aspirating at home. He began having worsening SOB past few days. He gets sob even with walking a few steps. He also endorses white/clear sputum production at home. Denies recent fevers, chills, cp, abd pain, n/v, leg pain, melena, hematochezia, hematemesis.  In ED: Pt was Afebrile, with wbc of 19k. CT chest with B/L Opacities and pt with elevated BNP Pt was started on BIPAP and vanc/zosyn .Sputum cx grew pseudomonas and abx were narrowed to cefepime. Sputum cx now growing mold and ID consulted.    WORKUP  Sputum cx: Pansensitive Mucin producing pseudomonas. Mold like fungus to be identified   CT Chest No Cont (02.12): Secretions throughout the trachea and right main stem bronchus. Emphysema. Scattered bilateral nodular and patchy opacities  within the right upper, right middle and left upper lobes, the largest in  the right middle lobe.   TTE with Doppler (w/Cont) (02.13.23 ): Severe global left ventricular systolic dysfunction. Estimated LVEF in the 20-25% range   BNP: 21062 (02-12)  Procal: 1.4  Blood cx, Urine cx, Ur legionella: Negative    DIAGNOSIS and IMPRESSION  ·	Pneumonia  ·	Acute Hypoxic Respiratory failure 2/2 PNA, CHF and COPD exacerbation  ·	DM (A1C 7.4)    Afebrile with leukocytosis to 15k on admission that has resolved  S/p Vanc (2/12), Flagyl (2/12), Zosyn (2/12 - 2/13) Cefepime (2/12 - 2/16)  Currently on levaquin 750mg Q48 (2/17 - )  CT chest with B/L Nodular opacities, no prior imaging to compare to    RECOMMENDATIONS  [facility] [employer] report shows sputum also with mold in march 2022 -> Please get full report from [facility]  Pt appears to have clinically improved on abx -> C/w levaquin to complete a 7 day course  Pt reporting difficulty swallowing large leavaquin pill -> Can either crush pill or give him IV  Please send galactomannan and fungitell  F.u mold ID -> If pt has clinically improved, can f/u with his out pt pulm or with  regarding f/u possible treatment for mold in sputum  Pt also with significant pain over left ankle on dependant part -> Recommend offloading    Pt seen and examined. Case d/w attending   Recommendation provided to primary team via MS Teams.      Sergio Navarrete MD, PGY5  ID fellow  [employer] Teams Preferred  After 5pm/weekends call [phone]

## 2023-02-17 NOTE — PROGRESS NOTE ADULT - PROBLEM SELECTOR PLAN 8
Initially reported that patient takes Humalog 25U BID at home however, per family insulin regimen is not steady.  - lantus 10U and admelog 8U premeal with SSI  - will require daily titration of insulin regimen  - patient son refusing optimization of diabetes regimen so titrating per their request  - CC diet Takes Lopressor 12.5 BID PO.  - c/w Lopressor 12.5 mg PO BID as patient and son refusing optimization with metoprolol succinate 25 QD

## 2023-02-17 NOTE — CONSULT NOTE ADULT - ATTENDING COMMENTS
The patient was seen and examined with the Cardiology Consultation Teaching Service.     He is an 83-year-old man with coronary artery disease, multiple prior PCI, chronic heart failure with reduced LV function, atrial fibrillation and chronic kidney disease who presented with worsening dyspnea and episodes of tachycardia noted by the family.    The patient reports that he is breathing better at the moment.  He denies experiencing chest pain.  His exercise capacity is decreased to walking a few steps.     8 months ago he was hospitalized for aspiration pneumonia.  The family feels strongly that the patient's presentation is related to his heart failure.     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%  Chronic heart failure with reduced LV function, Class C, NYHA 3  Atrial fibrillation  Chronic kidney disease Stage 3  Diabetes  Hypertension  Dyslipidemia  Aspiration pneumonia    Comfortable-appearing elderly man in no acute distress  Alert and oriented  Afebrile  Vital signs stable  Blood pressure is low normal  JVP is not elevated  Rales predominantly on the right lower lobe  Irregularly irregular  Normal heart sounds  Extremities are warm and perfused  No peripheral edema     Leukocytosis 16K  Normocytic anemia  Azotemia  GFR 23    Hs-troponin 176, 70 on admission  CK -MB 13  pro-BNP 11K    ECG demonstrates atrial fibrillation with RBBB, left axis, and septal Q waves  Chest CT demonstrates emphysema with patchy bilateral lung opacities    Impression and Recommendations:   83-year-old man with coronary artery disease, multiple prior PCI, chronic heart failure with reduced LV function, atrial fibrillation and chronic kidney disease who presented with worsening dyspnea and episodes of tachycardia noted by the family, found to have bilateral patchy opacities on chst CT and a mildly elevated hs-troponin.    It is difficult to distinguish whether this patient's presentation is an acute episode of decompensated heart failure, a result of aspiration, or the development of community acquired pneumonia. The patient is at risk for the development of any of these clinical entities. At present, he has received both diuretics and intravenous antibiotics, and has clinically improved.     Echocardiography was ordered, although I suspect that it may be abnormal at baseline, given his known history of heart failure and the septal Q waves seen on ECG. I do not suspect that the patient is experiencing acute coronary syndrome or that the patient's troponin elevation represents myocardial infarction. The patient is chest pain free and there is no evidence of ischemia on his ECG. Obtaining copies of his prior echocardiography would be helpful.     Continue apixaban for stroke reduction in atrial fibrillation as previously.   Continue atorvastatin for known coronary artery disease; he is on rosuvastatin at home.     Intravenous bumetanide was started for diuresis, and this can be continued as he has clinically improved. A reasonable I/O goal would be net negative 0.5L as the patient does not appear to be grossly volume overloaded. Continue metoprolol for rate control. This should eventually be transitioned to metoprolol succinate for management of his cardiomyopathy. The patient is not on ARB, ACE-inhibitor or ARNI, presumably related to his decreased GFR. Digoxin is being held in the setting of acute kidney injury and hyperkalemia.     Marcell Garcia MD  Cardiology  [phone]
Acute respiratory failure and ZACHARY in the setting of acute on chronic heart failure exacerbation and pneumonia.    Would continue diuresis with bumex and broad spectrum antibiotics for pneumonia.  Anticoagulation for AF is preferred, but may use parenteral therapy (heparin) pending resolution of ZACHARY.    VALARIE Aggarwal MD [facility]  Cardiology
83-year-old male with significant past medical history of congestive heart failure with an EF of 20%, bronchiectasis on 2 L supplemental oxygen who comes in for lethargy and acute hypoxic respiratory failure.  Bilevel was started due to CHF exacerbation.  Patient is tolerating 10/5 cm H2O appropriately and is not under respiratory distress and oxygenating well.    -Patient can continue using bilevel 10/5 cm H2O and remove as diuresis occurs.  -Some concern that the patient is not diuresing appropriately by the time I saw the patient.  Minimal urine output had been obtained with 2 mg of Bumex.  -Cardiology consult may be of service given the acute decompensated heart failure.    No need for ICU level of care.  Thank you for your consult.
Pseudomonas pneumonia improving  Significance of unidentified mold in sputum unclear - still testing  Would be helpful to get records from his pulmonologist or outside hospital    If d/c prior can f/u with his pulmonologist  Daughter at bedside given my contact information   ID service available over weekend

## 2023-02-17 NOTE — PROGRESS NOTE ADULT - ASSESSMENT
83M with a PMH of Ischemic Cardiomyopathy (LVEF in 2019 25%) refused Defibrillator in the past, CAD (multiple stents 5-6 last stent over 10 years ago at [facility]), T2DM, Bronchiectasis?, CKD3, HTN, HLD presenting with worsening shortness of breath. Cardiology consulted for elevated troponin    1. Hypoxic/Hypercarbic Respiratory Failure - likely in the setting of acute heart failure exacerbation also concerned for underlying aspiration? 8 months ago admitted to [facility] for aspiration PNA and has been coughing on water  2. Acute on Chronic Heart Failure - TTE 2019 LVEF 25% refusing defibrillator on Bumex 1mg PO Q12H  3. ZACHARY on CKD  4. Elevated Troponin      #Elevated Troponin  - Patient with elevated CE  - Troponinemia most likely in the setting of demand ischemia potentially due to an aspiration event  - EKG shows no ischemic changes (Afib with known RBBB)  - Nuclear stress testing shows mostly fixed defects. No additional cardiac testing  - Continuous cardiac monitoring to monitor for arrhythmias  -Continue atorvastatin 40mg daily  -Continue metop succinate 25mg daily      #HFrEF (EF 25%)  Patient's shortness of breath is most likely due to aspiration as the patient does not appear to be significantly fluid overloaded on exam. TTE shows evidence of low EF which is suspected in becky patient with previous ICM.   - ECHO obtained, results noted  - bumex 1mg PO BID  - Fluid restrict daily weights strict I/Os goal net negative 1-2L  -Keep net negative 0.5L a day  -Continue metop succinate 25mg daily  -Unable to start ACE/ARB/ARni in the setting of elevated creatinine, unclear if he is at baseline at this time (Creatinine still downtredning). When creatinine stabilizes, can start losartan 25mg daily  -Continue with aldactone 25mg daily    #Atrial fibrillation  -Continue apixaban  -Continue metoprolol as above    Chivo Weinstein MD  Cardiology fellow

## 2023-02-17 NOTE — PROGRESS NOTE ADULT - ASSESSMENT
83M with a PMH of Ischemic Cardiomyopathy (LVEF in 2019 25%) refused Defibrillator in the past, CAD (multiple stents 5-6 last stent over 10 years ago at [facility]), T2DM, afib, CKD3, HTN, HLD , afib presenting with AHRF iso CHF exacerbation with sepsis due to Pseudomonal PNA and ZACHARY. s/p BiPAP treatment and now on NC with adequate oxygenation and improvement in hypercarbia. Pending formal S&S eval for concern for aspiration.  83M with a PMH of Ischemic Cardiomyopathy (LVEF in 2019 25%) refused Defibrillator in the past, CAD (multiple stents 5-6 last stent over 10 years ago at [facility]), T2DM, afib, CKD3, HTN, HLD , afib presenting with AHRF iso CHF exacerbation with sepsis due to Pseudomonal PNA and ZACHARY. s/p BiPAP treatment and now on NC with adequate oxygenation and improvement in hypercarbia. Being treated with antibiotics for aspiration x 7 days. Course c/b sputum culture growing Pseudomonas with moldy growth. Pending evaluation by ID for moldy growth in sputum.  83M with a PMH of Ischemic Cardiomyopathy (LVEF in 2019 25%) refused Defibrillator in the past, CAD (multiple stents 5-6 last stent over 10 years ago at [facility]), T2DM, afib, CKD3, HTN, HLD , afib presenting with AHRF iso CHF exacerbation with sepsis due to Pseudomonal PNA and ZACHARY. s/p BiPAP treatment and now on NC with adequate oxygenation and improvement in hypercarbia. Being treated with antibiotics for aspiration x 7 days. Course c/b sputum culture growing Pseudomonas with moldy growth. Pending evaluation by ID for moldy growth in sputum.

## 2023-02-17 NOTE — PROGRESS NOTE ADULT - PROBLEM SELECTOR PLAN 6
EKG on admission shows afib RVR.  - c/w eliquis  - c/w digoxin 125 mcg q48 hours  - c/w metoprolol tartrate 12.5 BID as patient is refusing metoprolol succinate 25 QD Presenting with Cr: 2.78 with slight hyperkalemia 5.8 on admission. Baseline SCr 2.2 in 10/2022. Presentation appears to be ZACHARY on CKD  - Drew catheter removed and patient passed TOV  - Strict I&O  - resolved

## 2023-02-18 ENCOUNTER — TRANSCRIPTION ENCOUNTER (OUTPATIENT)
Age: 83
End: 2023-02-18

## 2023-02-18 VITALS
DIASTOLIC BLOOD PRESSURE: 52 MMHG | OXYGEN SATURATION: 94 % | SYSTOLIC BLOOD PRESSURE: 106 MMHG | TEMPERATURE: 98 F | RESPIRATION RATE: 17 BRPM | HEART RATE: 82 BPM

## 2023-02-18 PROBLEM — E11.9 TYPE 2 DIABETES MELLITUS WITHOUT COMPLICATIONS: Chronic | Status: ACTIVE | Noted: 2023-02-12

## 2023-02-18 PROBLEM — Z86.79 PERSONAL HISTORY OF OTHER DISEASES OF THE CIRCULATORY SYSTEM: Chronic | Status: ACTIVE | Noted: 2023-02-12

## 2023-02-18 PROBLEM — I48.91 UNSPECIFIED ATRIAL FIBRILLATION: Chronic | Status: ACTIVE | Noted: 2023-02-12

## 2023-02-18 PROBLEM — I10 ESSENTIAL (PRIMARY) HYPERTENSION: Chronic | Status: ACTIVE | Noted: 2023-02-12

## 2023-02-18 LAB
ANION GAP SERPL CALC-SCNC: 12 MMOL/L — SIGNIFICANT CHANGE UP (ref 7–14)
BUN SERPL-MCNC: 88 MG/DL — HIGH (ref 7–23)
CALCIUM SERPL-MCNC: 9 MG/DL — SIGNIFICANT CHANGE UP (ref 8.4–10.5)
CHLORIDE SERPL-SCNC: 99 MMOL/L — SIGNIFICANT CHANGE UP (ref 98–107)
CO2 SERPL-SCNC: 26 MMOL/L — SIGNIFICANT CHANGE UP (ref 22–31)
CREAT SERPL-MCNC: 1.82 MG/DL — HIGH (ref 0.5–1.3)
EGFR: 36 ML/MIN/1.73M2 — LOW
GLUCOSE BLDC GLUCOMTR-MCNC: 183 MG/DL — HIGH (ref 70–99)
GLUCOSE BLDC GLUCOMTR-MCNC: 216 MG/DL — HIGH (ref 70–99)
GLUCOSE BLDC GLUCOMTR-MCNC: 259 MG/DL — HIGH (ref 70–99)
GLUCOSE SERPL-MCNC: 247 MG/DL — HIGH (ref 70–99)
MAGNESIUM SERPL-MCNC: 2.1 MG/DL — SIGNIFICANT CHANGE UP (ref 1.6–2.6)
PHOSPHATE SERPL-MCNC: 2.9 MG/DL — SIGNIFICANT CHANGE UP (ref 2.5–4.5)
POTASSIUM SERPL-MCNC: 3.8 MMOL/L — SIGNIFICANT CHANGE UP (ref 3.5–5.3)
POTASSIUM SERPL-SCNC: 3.8 MMOL/L — SIGNIFICANT CHANGE UP (ref 3.5–5.3)
SODIUM SERPL-SCNC: 137 MMOL/L — SIGNIFICANT CHANGE UP (ref 135–145)

## 2023-02-18 PROCEDURE — 99239 HOSP IP/OBS DSCHRG MGMT >30: CPT | Mod: GC

## 2023-02-18 RX ORDER — METOPROLOL TARTRATE 50 MG
25 TABLET ORAL
Qty: 0 | Refills: 0 | DISCHARGE

## 2023-02-18 RX ORDER — SPIRONOLACTONE 25 MG/1
1 TABLET, FILM COATED ORAL
Qty: 0 | Refills: 0 | DISCHARGE

## 2023-02-18 RX ORDER — LEVOFLOXACIN 5 MG/ML
1 INJECTION, SOLUTION INTRAVENOUS
Qty: 0 | Refills: 0 | DISCHARGE
Start: 2023-02-18

## 2023-02-18 RX ORDER — ROSUVASTATIN CALCIUM 5 MG/1
1 TABLET ORAL
Qty: 0 | Refills: 0 | DISCHARGE

## 2023-02-18 RX ORDER — ATORVASTATIN CALCIUM 80 MG/1
1 TABLET, FILM COATED ORAL
Qty: 0 | Refills: 0 | DISCHARGE
Start: 2023-02-18

## 2023-02-18 RX ORDER — INSULIN LISPRO 100 [IU]/ML
100 INJECTION, SUSPENSION SUBCUTANEOUS
Qty: 0 | Refills: 0 | DISCHARGE

## 2023-02-18 RX ADMIN — Medication 6: at 07:47

## 2023-02-18 RX ADMIN — SPIRONOLACTONE 25 MILLIGRAM(S): 25 TABLET, FILM COATED ORAL at 05:43

## 2023-02-18 RX ADMIN — APIXABAN 2.5 MILLIGRAM(S): 2.5 TABLET, FILM COATED ORAL at 05:42

## 2023-02-18 RX ADMIN — Medication 4 UNIT(S): at 07:47

## 2023-02-18 RX ADMIN — Medication 4: at 11:44

## 2023-02-18 RX ADMIN — Medication 4 UNIT(S): at 11:44

## 2023-02-18 RX ADMIN — BUMETANIDE 1 MILLIGRAM(S): 0.25 INJECTION INTRAMUSCULAR; INTRAVENOUS at 05:43

## 2023-02-18 RX ADMIN — Medication 125 MICROGRAM(S): at 11:43

## 2023-02-18 NOTE — PROGRESS NOTE ADULT - PROBLEM SELECTOR PLAN 6
Presenting with Cr: 2.78 with slight hyperkalemia 5.8 on admission. Baseline SCr 2.2 in 10/2022. Presentation appears to be ZACHARY on CKD  - Drew catheter removed and patient passed TOV  - Strict I&O  - resolved

## 2023-02-18 NOTE — PROGRESS NOTE ADULT - PROBLEM SELECTOR PLAN 4
On admission with elevated troponin of 70 but without chest pain. With increase in trops but peaked and downtrending at 215. Etiology likely demand ischemia secondary to afib RVR, CHF exacerbation and copd exacerbation (although less likely)  - low suspicion for ACS   - Cardiology recommendations appreciated  - nuclear stress test with diffuse areas of ischemia; no acute intervention  - cardiology appointment made for OP follow up

## 2023-02-18 NOTE — PROGRESS NOTE ADULT - ASSESSMENT
83M with a PMH of Ischemic Cardiomyopathy (LVEF in 2019 25%) refused Defibrillator in the past, CAD (multiple stents 5-6 last stent over 10 years ago at [facility]), T2DM, afib, CKD3, HTN, HLD , afib presenting with AHRF iso CHF exacerbation with sepsis due to Pseudomonal PNA and ZACHARY. s/p BiPAP treatment and now on NC with adequate oxygenation and improvement in hypercarbia. Being treated with antibiotics for aspiration x 7 days. Course c/b sputum culture growing Pseudomonas with moldy growth. With moldy growth in sputum unchanged from March; to be followed up in the outpatient setting.

## 2023-02-18 NOTE — PROVIDER CONTACT NOTE (OTHER) - NAME OF MD/NP/PA/DO NOTIFIED:
desi rodriguez
Caroline Akbar
Shannen Bryant, PA
[facility]
Felisa Roberto
winsome gill
Shannen Akbar
Shannen Bryant
Dr Gudino team 1
HS 1  Radha

## 2023-02-18 NOTE — DISCHARGE NOTE NURSING/CASE MANAGEMENT/SOCIAL WORK - PATIENT PORTAL LINK FT
You can access the FollowMyHealth Patient Portal offered by [facility] by registering at the following website: [email or web address]. By joining [employer]’s FollowMyHealth portal, you will also be able to view your health information using other applications (apps) compatible with our system.

## 2023-02-18 NOTE — PROVIDER CONTACT NOTE (OTHER) - ASSESSMENT
A&Ox3 no s/s of acute distress. vitals stable. no chest pain.
Vital signs are as follows:  /56  HR 85  SpO2 98%  Temp 97.2
A&Ox3 no s/s of acute distress.
A&Ox3 no s/s of acute distress
A&Ox3, NSR on tele   Pt son stated that Spironolactone is not a home meds, so father is not gonna take it  Pt takes Metoprolol 12.5 twice daily at home , he refused to take 25 mg daily dose
A&Ox4, Pt denies chest pain, SOB or headache.
Vital signs are as follow  /52  HR 82  Temp 97.8  SpO2 100%  Patient remained asymptomatic and had no complaints of chest pain.
[id number]
A&Ox3 no s/s of acute distress

## 2023-02-18 NOTE — DISCHARGE NOTE NURSING/CASE MANAGEMENT/SOCIAL WORK - NSDCPEFALRISK_GEN_ALL_CORE
For information on Fall & Injury Prevention, visit: [email or web address] OR  [email or web address] OR  https://www.cdc.gov/steadi/patient.html

## 2023-02-18 NOTE — PROGRESS NOTE ADULT - PROBLEM SELECTOR PLAN 2
Presenting with bilateral pleural effusions with BNP 11K iso of PNA. Unclear if compliant on medications in the outpatient setting. TTE 2019 LVEF 25% refusing defibrillator and optimization of medications.   - c/w home bumex 1 mg PO BID   - Strict I&Os and daily weights   - TTE 2/13 with EF 20-25%   - patient refusing spironolactone and optimization of metoprolol dosing  - c/w metoprolol tartrate (home med) 12.5 BID  - patient on baseline 2L NC

## 2023-02-18 NOTE — PROGRESS NOTE ADULT - REASON FOR ADMISSION
CHF exacerbation

## 2023-02-18 NOTE — DISCHARGE NOTE NURSING/CASE MANAGEMENT/SOCIAL WORK - NSDCPEELIQUISCOMP_GEN_ALL_CORE
Apixaban/Eliquis is used to treat and prevent blood clots. If you are not able to swallow the tablets whole, they may be crushed and mixed in water, apple juice, or applesauce and promptly taken within four hours. Never skip a dose of Apixaban/Eliquis. If you forget to take your Apixaban/Eliquis, take a dose as soon as you remember. If it is almost time for your next Apixaban/Eliquis dose, wait until then and take a regular dose. DO NOT take an extra pill to ‘catch up’.  NEVER TAKE A DOUBLE DOSE. Notify your doctor that you missed a dose. Take Apixaban/Eliquis at the same time each morning and evening. Apixaban/Eliquis may be taken with other medication or food.
Spontaneous, unlabored and symmetrical

## 2023-02-18 NOTE — PROVIDER CONTACT NOTE (OTHER) - SITUATION
Pt is A/Ox4. Fingerstick has been elevated, latest result was 429 despite doses of insulin SQ.
Pt took Bumex and Eliquis , refused Metoprolol 25 mg and Spironolactone
pt received from ER found to be hyperglycemic 541.
patient refused stat metoprolol
Admitted for heart failure.
Blood pressure 91/46
Admitted for heart failure.
cefapime says to give post dialysis- patient didn't go for dialysis today
patient refused morning spironolactone
patient with 7 beats vtach on tele

## 2023-02-18 NOTE — DISCHARGE NOTE NURSING/CASE MANAGEMENT/SOCIAL WORK - NSDCPEELIQUISREACT_GEN_ALL_CORE
Apixaban/Eliquis increases your risk for bleeding. Notify your doctor if you experience any of the following side effects: bleeding, coughing or vomiting blood, red or black stool, unexpected pain or swelling, itching or hives, chest pain, chest tightness, trouble breathing, changes in how much or how often you urinate, red or pink urine, numbness or tingling in your feet, or unusual muscle weakness. When Apixaban/Eliquis is taken with other medicines, they can affect how it works. Taking other medications such as aspirin, blood thinners, nonsteroidal anti-inflammatories, and medications that treat depression can increase your risk of bleeding. It is very important to tell your health care provider about all of the other medicines, including over-the-counter medications, herbs, and vitamins you are taking. DO NOT start, stop, or change the dosage of any medicine, including over-the-counter medicines, vitamins, and herbal products without your doctor’s approval. Any products containing aspirin or are nonsteroidal anti-inflammatories lessen the blood’s ability to form clots and add to the effect of Apixaban/Eliquis. Never take aspirin or medicines that contain aspirin without speaking to your doctor.
Adult

## 2023-02-18 NOTE — PROGRESS NOTE ADULT - PROBLEM/PLAN-8
DISPLAY PLAN FREE TEXT
Partial Purse String (Simple) Text: Given the location of the defect and the characteristics of the surrounding skin a simple purse string closure was deemed most appropriate.  Undermining was performed circumfirentially around the surgical defect.  A purse string suture was then placed and tightened. Wound tension only allowed a partial closure of the circular defect.

## 2023-02-18 NOTE — PROGRESS NOTE ADULT - PROBLEM SELECTOR PLAN 3
Patient with history of COPD with reported wheezing on admission. Appears that this is less likely contributing to his current disease state.  - s/p solumedrol 40mg in ED and prednisone 40 x 1 on floors  - will monitor off steroids due to lower suspicion for COPD exacerbation   - Will change DuoNebs to q.6.h. p.r.n. shortness of breath/wheezing  - incentive spirometer

## 2023-02-18 NOTE — PROVIDER CONTACT NOTE (OTHER) - DATE AND TIME:
13-Feb-2023 19:14
17-Feb-2023 21:25
15-Feb-2023 08:14
16-Feb-2023 04:50
12-Feb-2023 21:39
16-Feb-2023 07:01
14-Feb-2023 00:45
18-Feb-2023 02:30
15-Feb-2023 22:00
14-Feb-2023 17:39

## 2023-02-18 NOTE — PROGRESS NOTE ADULT - ATTENDING COMMENTS
The patient was seen and examined with the Cardiology Consultation Teaching Service.   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%  Chronic heart failure with reduced LV function, Class C, NYHA 3  Atrial fibrillation  Chronic kidney disease Stage 3  Diabetes  Hypertension  Dyslipidemia  Aspiration pneumonia    Comfortable-appearing elderly man in no acute distress  Alert and oriented  Afebrile  Vital signs stable  Blood pressure is low normal  JVP is not elevated  Clear lungs  Irregularly irregular  Normal heart sounds  Extremities are warm and perfused  No peripheral edema     Leukocytosis 13K  Normocytic anemia  Hyponatremia  Hypochloremia  Azotemia  Stable GFR 23    Hs-troponin 176, 70 on admission  CK -MB 13  pro-BNP 11K    ECG demonstrates atrial fibrillation with RBBB, left axis, and septal Q waves  Chest CT demonstrates emphysema with patchy bilateral lung opacities    Echocardiography demonstrates severe global LV systolic dysfunction with similar LVEF noted on prior echocardiography, mild MR, mild AI, normal LA size.     Impression and Recommendations:   83-year-old man with coronary artery disease, multiple prior PCI, chronic heart failure with reduced LV function, atrial fibrillation and chronic kidney disease who presented with worsening dyspnea and episodes of tachycardia noted by the family, found to have bilateral patchy opacities on chest CT and a mildly elevated hs-troponin.    It is difficult to distinguish whether this patient's presentation is an acute episode of decompensated heart failure, a result of aspiration, or the development of community acquired pneumonia. The patient is at risk for the development of any of these clinical entities. At present, he has received both diuretics and intravenous antibiotics, and has clinically improved.     Echocardiography demonstrates severely reduced LV function in the range previously described on prior echocardiography. I do not suspect that the patient is experiencing acute coronary syndrome or that the patient's troponin elevation represents myocardial infarction. The patient is chest pain free and there is no evidence of ischemia on his ECG.     I spoke with the patient's son at length, and he has significant concerns regarding his father's coronary artery disease, and reports that his presentation is similar to the time many years ago when he required cardiac catheterization and PCI. I discussed the potential risks and benefits of pursuing an invasive strategy, and his son wished to defer any invasive procedures. He does wish to pursue non-invasive assessment with stress testing, and this is reasonable.     We will reassess the patient tomorrow, and if his volume status is satisfactory, we will refer the patient for stress testing with myocardial perfusion imaging.     Continue intravenous diuresis as the patient is clinically improving with a goal of net negative 0.5L for now. Continue metoprolol succinate for management of his cardiomyopathy. The patient is not on ARB, ACE-inhibitor or ARNI, presumably related to his decreased GFR. Digoxin is being held in the setting of acute kidney injury and hyperkalemia.    Continue apixaban for stroke reduction in atrial fibrillation as previously.   Continue atorvastatin for known coronary artery disease; he is on rosuvastatin at home.     Marcell Garcia MD  Cardiology  [id number]
The patient was seen and examined with the Cardiology Consultation Teaching Service.   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%  Chronic heart failure with reduced LV function, Class C, NYHA 3  Atrial fibrillation  Chronic kidney disease Stage 3  Diabetes  Hypertension  Dyslipidemia  Aspiration pneumonia    Comfortable-appearing elderly man in no acute distress  Alert and oriented  Afebrile  Vital signs stable  Blood pressure is low normal  JVP is not elevated  Clear lungs  Irregularly irregular  Normal heart sounds  Extremities are warm and perfused  No peripheral edema     Leukocytosis 13K  Worsening normocytic anemia  Hypochloremia  Azotemia  Stable GFR 25    Hs-troponin 176, 70 on admission  CK -MB 13  pro-BNP 11K    ECG demonstrates atrial fibrillation with RBBB, left axis, and septal Q waves  Chest CT demonstrates emphysema with patchy bilateral lung opacities    Echocardiography demonstrates severe global LV systolic dysfunction with similar LVEF noted on prior echocardiography, mild MR, mild AI, normal LA size.     Impression and Recommendations:   83-year-old man with coronary artery disease, multiple prior PCI, chronic heart failure with reduced LV function, atrial fibrillation and chronic kidney disease who presented with worsening dyspnea and episodes of tachycardia noted by the family, found to have bilateral patchy opacities on chest CT and a mildly elevated hs-troponin.    The patient under went barium swallow study this morning and reports that it went well, though he is persistently coughing this morning.     Echocardiography demonstrates severely reduced LV function in the range previously described on prior echocardiography. I do not suspect that the patient is experiencing acute coronary syndrome or that the patient's troponin elevation represents myocardial infarction. The patient is chest pain free and there is no evidence of ischemia on his ECG.     I spoke with the patient's son at length, and he has significant concerns regarding his father's coronary artery disease, and reports that his presentation is similar to the time many years ago when he required cardiac catheterization and PCI. I discussed the potential risks and benefits of pursuing an invasive strategy, and his son wished to defer any invasive procedures. He does wish to pursue non-invasive assessment with stress testing, and this is reasonable. We will plan for stress testing with myocardial perfusion imaging tomorrow.     The patient appears euvolemic. Would transition the patient to PO diuretics to maintain volume status. Continue metoprolol succinate and spironolactone for management of his cardiomyopathy. Could consider initiation of ARB given his GFR is stable and hyperkalemia has not been noted over the last several days. If this is started, it will require close electrolyte monitoring.     Continue apixaban for stroke reduction in atrial fibrillation as previously.   Continue atorvastatin for known coronary artery disease; he is on rosuvastatin at home.    Marcell Garcia MD  Cardiology  [id number]
The patient was seen and examined with the Cardiology Consultation Teaching Service.     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%  Chronic heart failure with reduced LV function, Class C, NYHA 3  Atrial fibrillation  Chronic kidney disease Stage 3  Diabetes  Hypertension  Dyslipidemia  Aspiration pneumonia    Comfortable-appearing elderly man in no acute distress  Alert and oriented  Afebrile  Vital signs stable  Blood pressure is low normal  JVP is not elevated  Clear lungs  Irregularly irregular  Normal heart sounds  Extremities are warm and perfused  No peripheral edema     Leukocytosis 13K  Stable normocytic anemia  Azotemia  Improving GFR 32    Hs-troponin 176, 70 on admission  CK -MB 13  pro-BNP 11K    ECG demonstrates atrial fibrillation with RBBB, left axis, and septal Q waves  Chest CT demonstrates emphysema with patchy bilateral lung opacities    Echocardiography demonstrates severe global LV systolic dysfunction with similar LVEF noted on prior echocardiography, mild MR, mild AI, normal LA size.     Impression and Recommendations:   83-year-old man with coronary artery disease, multiple prior PCI, chronic heart failure with reduced LV function, atrial fibrillation and chronic kidney disease who presented with worsening dyspnea and episodes of tachycardia noted by the family, found to have bilateral patchy opacities on chest CT and a mildly elevated hs-troponin.    The patient under went barium swallow study this morning and reports that it went well, though he is persistently coughing this morning.     Echocardiography demonstrates severely reduced LV function in the range previously described on prior echocardiography. I do not suspect that the patient is experiencing acute coronary syndrome or that the patient's troponin elevation represents myocardial infarction. The patient is chest pain free and there is no evidence of ischemia on his ECG.     I spoke with the patient's son at length, and he has significant concerns regarding his father's coronary artery disease, and reports that his presentation is similar to the time many years ago when he required cardiac catheterization and PCI. I discussed the potential risks and benefits of pursuing an invasive strategy, and his son wished to defer any invasive procedures. He does wish to pursue non-invasive assessment with stress testing, and this is reasonable. Awaiting stress testing with myocardial perfusion imaging.     The patient appears euvolemic. Continue current diuretic dosing. Continue metoprolol succinate and spironolactone for management of his cardiomyopathy, although at present, the patient is declining to take these medications.     Continue apixaban for stroke reduction in atrial fibrillation as previously.   Continue atorvastatin for known coronary artery disease; he is on rosuvastatin at home.    Marcell Garcia MD  Cardiology  [id number]
The patient was seen and examined with the Cardiology Consultation Teaching Service.     No overnight events    No chest pain  No dyspnea, orthopnea or PND  No palpitations or dizziness     Stress testing with myocardial perfusion imaging was performed yesterday and demonstrated large areas of infarction, but no ischemia.     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%  Chronic heart failure with reduced LV function, Class C, NYHA 3  Atrial fibrillation  Chronic kidney disease Stage 3  Diabetes  Hypertension  Dyslipidemia  Aspiration pneumonia    Comfortable-appearing elderly man in no acute distress  Alert and oriented  Afebrile  Vital signs stable  Blood pressure is low normal  JVP is not elevated  Clear lungs  Irregularly irregular  Normal heart sounds  Extremities are warm and perfused  No peripheral edema     Azotemia  GFR 36    Hs-troponin 176, 70 on admission  CK-MB 13  pro-BNP 11K    ECG demonstrates atrial fibrillation with RBBB, left axis, and septal Q waves  Chest CT demonstrates emphysema with patchy bilateral lung opacities    Echocardiography demonstrates severe global LV systolic dysfunction with similar LVEF noted on prior echocardiography, mild MR, mild AI, normal LA size.     Impression and Recommendations:   83-year-old man with coronary artery disease, multiple prior PCI, chronic heart failure with reduced LV function, atrial fibrillation and chronic kidney disease who presented with worsening dyspnea and episodes of tachycardia noted by the family, found to have bilateral patchy opacities on chest CT and a mildly elevated hs-troponin.    Echocardiography demonstrates severely reduced LV function in the range previously described on prior echocardiography. Stress testing with myocardial perfusion imaging demonstrated a large area of infarction in the LAD territory, but no areas of myocardial ischemia. No plans for cardiac catheterization.    I suspect that this patient's presentation is primarily consistent with acute decompensated heart failure, possibly triggered by an upper respiratory infection. The patient has been well diuresed and he is on reasonable medical therapy. In the hospital he has been declining to take some of these medications, and we reinforced the importance of these drugs with the patient. We will speak to the patient's son as well.     The patient appears euvolemic. Continue current diuretic dosing. Continue metoprolol succinate and spironolactone for management of his cardiomyopathy. Could consider the addition of an ACE-inhibitor or ARB, but this would require close monitoring of his renal function and electrolytes. Unclear if his blood pressure would tolerate. I suspect he would not tolerate an ARNI given his borderline blood pressure.     Continue apixaban for stroke reduction in atrial fibrillation as previously.   Continue atorvastatin for known coronary artery disease; he is on rosuvastatin at home.    Marcell Garcia MD  Cardiology  [phone]
83 year old [place]i-speaking male with a history of HFrEF (EF 25%), refused AICD previously, CAD s/p stents, Type 2 DM, Afib on Eliquis, CKD, COPD, HTN, HLD admitted for acute hypercarbic respiratory failure with decompensated CHF, sepsis likely 2/2 aspiration PNA and ZACHARY.    patient seen and examined at bedside. Pt appears comfortable, no respiratory distress , daughter at bedside     #Acute hypercarbic respiratory failure with chronic hypoxic respiratory failure  -suspect primary etiology is pseudomonas PNA and aspiration  -sputum culture now also growing mold-like fungus  -ID consulted  -reportedly on home 2L NC PRN since prior hospitalization last year  -pt saturating 94-95% on RA     #Acute on chronic systolic CHF  -c/w Bumex 1mg PO BID, appears euvolemic  -TTE reviewed with severe LV systolic dysfunction, estimated EF 25%- appears to be at baseline  -stress test with fixed defects noted, no further intervention at this time  -refused AICD placement  -cardiology following, appreciate recs  -c/w metoprolol tartrate 12.5mg PO BID- pt refusing to take higher dose of 25mg  -c/w spironolactone 25mg PO qd- pt refusing to take it    #Pseudomonas PNA  -s/p 4 days of Cefepime, now on Levaquin for 2 more days  -sputum culture growing mucin-producing Pseudomonas, sputum culture with mold growth unchanged from previously - ID paty appreciated,  can f/u with his out pt pulm or with  regarding f/u possible treatment for mold in sputum   - fungitell and aspergillus sent; to be followed OP   -S&S eval appreciated- c/w pureed with mildly thickened liquid diet  -aspiration precaution     #Type 2 DM  -A1c at goal of 7.4%  -c/w moderate ISS  -c/w Lantus 5u qHS, Admelog 4u qac, titrate as needed    #Longstanding persistent Afib  -rate controlled 80s-90s  -c/w Eliquis, metoprolol tartrate and digoxin    #ZACHARY on CKD, now resolved  -Cr 2.2 in 2022  -monitor Cr, improved to 1.81   Patient hemodynamically stable for discharge home  Time spent in discharge process is 40 min  plan of care d/w daughter at bedside
83 year old [place]rydein[place]-speaking male with a history of HFrEF (EF 25%), refused AICD previously, CAD s/p stents, Type 2 DM, Afib on Eliquis, CKD, COPD, HTN, HLD admitted for acute hypercarbic respiratory failure with decompensated CHF, sepsis likely 2/2 aspiration PNA and ZACHARY.    patient seen and examined at bedside. pt's son present. pt eating lunch, appears well. no acute complaints. pt's son still deferring AICD after discussion of TTE results from yesterday.    #Acute hypercarbic respiratory failure with chronic hypoxic respiratory failure  -in setting of decompensated CHF, also PNA, now on nasal cannula (reportedly on 2L NC at home PRN)    #Acute on chronic systolic CHF  -unclear compliance with home medications  -decrease Bumex to home dose of 1mg PO BID, appears euvolemic  -TTE reviewed with severe LV systolic dysfunction, estimated EF 25%- appears to be at baseline per pt's son  -still refusing AICD  -cardiology following, appreciate recs  -c/w BB    #?COPD exacerbation  -no wheezing on exam today  -patient with severe steroid-induced hyperglycemia  -will d/c prednisone and monitor  -c/w nebs    #PNA  -unclear history of aspiration PNA  -c/w Cefepime empirically  -S&S eval appreciated  -MBS ordered  -sputum culture if able    #Steroid-induced hyperglycemia with DM  -severe hyperglycemia noted on prednisone  -A1c otherwise at goal of 7.4%  -increased sliding scale to moderate qac  -started on basal/bolus insulin, titrate as needed; will d/c steroids today    #Longstanding persistent Afib  -rate controlled 80s-90s  -c/w Eliquis, BB and digoxin    #CKD, possible ZACHARY  -Cr 2.2 in 2022  -monitor Cr while on diuretics  -Drew placed in ER for urine output monitoring- d/c and TOV tonight
83 year old Ukrainian-speaking male with a history of HFrEF (EF 25%), refused AICD previously, CAD s/p stents, Type 2 DM, Afib on Eliquis, CKD, COPD, HTN, HLD admitted for acute hypercarbic respiratory failure with decompensated CHF, sepsis likely 2/2 aspiration PNA and ZACHARY.    patient seen and examined at bedside. Ukrainian translation provided by pt's RN. pt denies SOB, feels well. went for MBS this morning. passed TOV overnight.    #Acute hypercarbic respiratory failure with chronic hypoxic respiratory failure  -suspect primary etiology is pseudomonas PNA rather than CHF exacerbation  -reportedly on home 2L NC PRN since prior hospitalization last year    #Acute on chronic systolic CHF  -c/w Bumex 1mg PO BID, appears euvolemic  -TTE reviewed with severe LV systolic dysfunction, estimated EF 25%- appears to be at baseline per pt's son  -still refusing AICD placement  -cardiology following, appreciate recs  -c/w metoprolol tartrate 12.5mg PO BID- pt refusing to take higher dose of 25mg  -c/w spironolactone 25mg PO qd- confirmed with outpatient cardiologist that it was prescribed, however pt refusing to take it    #?COPD exacerbation  -no wheezing on exam today, lower suspicion for COPD exacerbation  -patient with severe steroid-induced hyperglycemia  -monitoring off steroids  -c/w Duonebs PRN    #Pseudomonas PNA  -c/w Cefepime  -sputum culture growing mucin-producing Pseudomonas- f/u sensitivities  -S&S eval appreciated- c/w pureed with mildly thickened liquid diet    #Elevated troponin  -likely in setting of acute infection, demand ischemia  -cardiology following  -pharmacologic nuclear stress test ordered    #Type 2 DM  -severe hyperglycemia noted on prednisone (now d/anabelle)  -A1c otherwise at goal of 7.4%, might be underestimation given anemia  -c/w moderate ISS  -c/w Lantus 10u qHS, Admelog 8u qac, titrate as needed    #Longstanding persistent Afib  -rate controlled 80s-90s  -c/w Eliquis, metoprolol tartrate and digoxin    #CKD, possible ZACHARY  -Cr 2.2 in 2022  -monitor Cr while on diuretics  -Drew d/anabelle overnight, passed TOV  -c/w tamsulosin for BPH
83 year old Indonesian-speaking male with a history of HFrEF (EF 25%), refused AICD previously, CAD s/p stents, Type 2 DM, Afib on Eliquis, CKD, COPD, HTN, HLD admitted for acute hypercarbic respiratory failure with decompensated CHF, sepsis likely 2/2 aspiration PNA and ZACHARY.    patient seen and examined at bedside. patient's son at bedside. patient going down for stress test. patient still coughing frequently per pt's son, thinks he may be aspirating. pt stubborn about sitting up during mealtimes.    #Acute hypercarbic respiratory failure with chronic hypoxic respiratory failure  -suspect primary etiology is pseudomonas PNA and aspiration  -reportedly on home 2L NC PRN since prior hospitalization last year    #Acute on chronic systolic CHF  -c/w Bumex 1mg PO BID, appears euvolemic  -TTE reviewed with severe LV systolic dysfunction, estimated EF 25%- appears to be at baseline per pt's son  -refused AICD placement  -cardiology following, appreciate recs  -c/w metoprolol tartrate 12.5mg PO BID- pt refusing to take higher dose of 25mg  -c/w spironolactone 25mg PO qd- confirmed with outpatient cardiologist that it was prescribed, however pt refusing to take it    #Pseudomonas PNA  -c/w Cefepime, 7-day course of abx, can switch to Levaquin when ready for discharge  -sputum culture growing mucin-producing Pseudomonas  -S&S eval appreciated- c/w pureed with mildly thickened liquid diet  -educated pt's son on maneuvers to minimize aspiration risk    #Elevated troponin  -likely in setting of acute infection, demand ischemia  -cardiology following  -pharmacologic nuclear stress test today    #Type 2 DM  -FS improved today  -A1c otherwise at goal of 7.4%, might be underestimation given anemia  -c/w moderate ISS  -c/w Lantus 10u qHS, Admelog 8u qac, titrate as needed    #Longstanding persistent Afib  -rate controlled 80s-90s  -c/w Eliquis, metoprolol tartrate and digoxin    #ZACHARY on CKD, now resolved  -Cr 2.2 in 2022  -monitor Cr    possible d/c home tomorrow pending stress test results
83 year old Bahamian-speaking male with a history of HFrEF (EF 25%), refused AICD previously, CAD s/p stents, Type 2 DM, Afib on Eliquis, CKD, COPD, HTN, HLD admitted for acute hypercarbic respiratory failure with decompensated CHF, sepsis likely 2/2 aspiration PNA and ZACHARY.    patient seen and examined at bedside. pt's son present. pt weaned off BiPAP to nasal cannula earlier in the morning. appears well, awake and alert, eating per son.     #Acute hypercarbic respiratory failure  -in setting of decompensated CHF, now on nasal cannula  -wean off O2 as tolerated    #Acute on chronic systolic CHF  -unclear compliance with home medications  -net negative 710mL while NPO  -crackles appreciated on exam  -increased Bumex to 2mg IV BID for today, can likely revert to home dose of 1mg BID tomorrow if urine output improves  -TTE reviewed with severe LV systolic dysfunction, estimated EF 25%- appears to be at baseline per pt's son  -patient refused AICD in the past, will revisit with patient tomorrow  -cardiology following, appreciate recs  -c/w BB    #COPD exacerbation  -mild wheezing still present today  -c/w prednisone 40mg PO x5 days  -c/w nebs    #PNA  -unclear history of aspiration PNA  -c/w Cefepime empirically  -S&S eval  -sputum culture    #Steroid-induced hyperglycemia with DM  -severe hyperglycemia noted on prednisone  -A1c otherwise at goal of 7.4%  -increased sliding scale to moderate qac  -started on basal/bolus insulin, titrate as needed  -may need endocrine eval if hyperglycemia does not improve    #Longstanding persistent Afib  -rate controlled 80s-90s  -c/w Eliquis, BB and digoxin    #CKD, possible ZACHARY  -unclear baseline  -monitor Cr while on diuretics  -Drew placed in ER for urine output monitoring- can likely d/c and TOV in 1-2 days
83 year old Guinean-speaking male with a history of HFrEF (EF 25%), refused AICD previously, CAD s/p stents, Type 2 DM, Afib on Eliquis, CKD, COPD, HTN, HLD admitted for acute hypercarbic respiratory failure with decompensated CHF, sepsis likely 2/2 aspiration PNA and ZACHARY.    patient seen and examined at bedside. pt noted to be retching/trying to cough but no productive sputum noted. spoke to pt's son on the phone in the room, informed him of mold-like fungus also growing from pt's sputum culture and stress test results.    #Acute hypercarbic respiratory failure with chronic hypoxic respiratory failure  -suspect primary etiology is pseudomonas PNA and aspiration  -sputum culture now also growing mold-like fungus  -ID consulted  -reportedly on home 2L NC PRN since prior hospitalization last year    #Acute on chronic systolic CHF  -c/w Bumex 1mg PO BID, appears euvolemic  -TTE reviewed with severe LV systolic dysfunction, estimated EF 25%- appears to be at baseline  -stress test with fixed defects noted, no further intervention at this time  -refused AICD placement  -cardiology following, appreciate recs  -c/w metoprolol tartrate 12.5mg PO BID- pt refusing to take higher dose of 25mg  -c/w spironolactone 25mg PO qd- pt refusing to take it    #Pseudomonas PNA  -s/p 4 days of Cefepime, now on Levaquin for 3 more days  -sputum culture growing mucin-producing Pseudomonas  -S&S eval appreciated- c/w pureed with mildly thickened liquid diet  -educated pt's son on maneuvers to minimize aspiration risk    #Type 2 DM  -A1c at goal of 7.4%  -c/w moderate ISS  -c/w Lantus 5u qHS, Admelog 4u qac, titrate as needed    #Longstanding persistent Afib  -rate controlled 80s-90s  -c/w Eliquis, metoprolol tartrate and digoxin    #ZACHARY on CKD, now resolved  -Cr 2.2 in 2022  -monitor Cr

## 2023-02-18 NOTE — PROGRESS NOTE ADULT - SUBJECTIVE AND OBJECTIVE BOX
Jeff Calderon  PGY-1 Resident Physician   Pager [phone]/ [phone]    Patient is a 83y old  Male who presents with a chief complaint of CHF exacerbation (17 Feb 2023 09:22)      SUBJECTIVE / OVERNIGHT EVENTS:  Patient seen and evaluated at bedside.  No complaints this AM> States that his SOB is improved. Informed that he would likely be going home today.     Vital Signs Last 24 Hrs  T(C): 36.2 (18 Feb 2023 05:15), Max: 36.7 (17 Feb 2023 09:55)  T(F): 97.2 (18 Feb 2023 05:15), Max: 98.1 (17 Feb 2023 13:30)  HR: 87 (18 Feb 2023 05:15) (81 - 87)  BP: 106/79 (18 Feb 2023 05:15) (96/54 - 107/56)  BP(mean): --  RR: 18 (18 Feb 2023 05:15) (17 - 18)  SpO2: 97% (18 Feb 2023 05:15) (96% - 100%)    Parameters below as of 18 Feb 2023 05:15  Patient On (Oxygen Delivery Method): nasal cannula  O2 Flow (L/min): 1      PHYSICAL EXAM:  GENERAL: NAD, well-developed  CHEST/LUNG: Crackles noted on L lower lung field unchanged from admission. Otherwise clear. No wheezing,  HEART: irregularly irregular on auscultation  ABDOMEN: Soft, Nontender, Nondistended; Bowel sounds present  EXTREMITIES:  2+ Peripheral Pulses, No clubbing, cyanosis, or edema  PSYCH: AAOx3    LABS:    Hgb Trend: 9.5<--, 8.7<--, 9.3<--, 10.3<--, 10.5<--  02-17    137  |  97<L>  |  94<H>  ----------------------------<  118<H>  3.5   |  27  |  1.81<H>    Ca    9.1      17 Feb 2023 05:18  Phos  2.9     02-17  Mg     2.20     02-17      Creatinine Trend: 1.81<--, 2.04<--, 2.47<--, 2.70<--, 2.78<--, 2.64<--           Jeff Calderon  PGY-1 Resident Physician   Pager [phone]/ [phone]    Patient is a 83y old  Male who presents with a chief complaint of CHF exacerbation (17 Feb 2023 09:22)      SUBJECTIVE / OVERNIGHT EVENTS:  Patient seen and evaluated at bedside.  No complaints this AM. States that his SOB is improved. No CP. Informed that he would likely be going home today. Daughter at bedside. Explained all follow up for patient.     Vital Signs Last 24 Hrs  T(C): 36.2 (18 Feb 2023 05:15), Max: 36.7 (17 Feb 2023 09:55)  T(F): 97.2 (18 Feb 2023 05:15), Max: 98.1 (17 Feb 2023 13:30)  HR: 87 (18 Feb 2023 05:15) (81 - 87)  BP: 106/79 (18 Feb 2023 05:15) (96/54 - 107/56)  BP(mean): --  RR: 18 (18 Feb 2023 05:15) (17 - 18)  SpO2: 97% (18 Feb 2023 05:15) (96% - 100%)    Parameters below as of 18 Feb 2023 05:15  Patient On (Oxygen Delivery Method): nasal cannula  O2 Flow (L/min): 1      PHYSICAL EXAM:  GENERAL: NAD, well-developed  CHEST/LUNG: Crackles noted on L lower lung field unchanged from admission. Otherwise clear. No wheezing,  HEART: irregularly irregular on auscultation  ABDOMEN: Soft, Nontender, Nondistended; Bowel sounds present  EXTREMITIES:  2+ Peripheral Pulses, No clubbing, cyanosis, or edema  PSYCH: AAOx3    LABS:    Hgb Trend: 9.5<--, 8.7<--, 9.3<--, 10.3<--, 10.5<--  02-17    137  |  97<L>  |  94<H>  ----------------------------<  118<H>  3.5   |  27  |  1.81<H>    Ca    9.1      17 Feb 2023 05:18  Phos  2.9     02-17  Mg     2.20     02-17      Creatinine Trend: 1.81<--, 2.04<--, 2.47<--, 2.70<--, 2.78<--, 2.64<--

## 2023-02-18 NOTE — PROVIDER CONTACT NOTE (OTHER) - REASON
Blood pressure 91/46
Pt son bedside, refused Spironolactone and Metoprolol 25 mg
patient refused morning spironolactone
cefapime says to give post dialysis- patient didn't go for dialysis today
elevated fingerstick
Hyperglycemia
patient refused stat metoprolol
Patient is AXO 4 and daughter at bedside refusing lantus 5 units, BP meds, and atorvastatin.
patient with 7 beats vtach on tele
Patient had 6 beats of Vtach. Patient remained asymptomatic.

## 2023-02-18 NOTE — PROGRESS NOTE ADULT - PROBLEM SELECTOR PLAN 1
Having aspirations at home and was hospitalized 8 months ago for ASP PNA. CT chest with bilateral opacities c/f PNA with leukocytosis 19K. Sputum Gram stain positive for Pseudomonas.  - s/p cefepime, flagyll and vancomycin in ED  - s/p cefepime (2/12 - 2/16); will continue on oral therapy with levofloxacin 500 q48 for renal dosing x 2 days given Pseudomonas for total 7 day course  - sputum culture with mold growth unchanged from previously - patient to follow up with his own OP pulm or will include information for ID office here for OP follow up  - fungitell and aspergillus sent; to be followed OP   - aspiration precautions  - will c/w diet per S/S recs

## 2023-02-18 NOTE — PROVIDER CONTACT NOTE (OTHER) - BACKGROUND
HF
HF
Pt hx HTN, DM, CHF, A.fib.
HF
HF
History of diabetes mellitus, hypertension, Afib, and CHF.
anemia
History of CHF, diabtetes mellitus, Afib, and hypertension.

## 2023-02-18 NOTE — PROGRESS NOTE ADULT - PROBLEM SELECTOR PLAN 7
Initially reported that patient takes Humalog 25U BID at home however, per family insulin regimen is not steady.  - lantus 10U and admelog 8U premeal with SSI  - will require daily titration of insulin regimen  - patient son refusing optimization of diabetes regimen so titrating per their request  - CC diet

## 2023-02-18 NOTE — PROVIDER CONTACT NOTE (OTHER) - ACTION/TREATMENT ORDERED:
Labs drawn. Insulin dose per sliding scale and standing order given. Additional 4 units admelog given as ordered.
Shannen Akbar aware. No new recommendations recommended. Will continue to monitor the patient. Safety maintained.
gave insulin dose based on sliding scale- 12units administered
provider notified.
provider notified. okay to hold cefapime as patient did not go for dialysis today. scheduled for dialysis tomorrow.
Caroline Akbar is aware of the situation. No new recommendations recommended at this time.
Spoke to Dr Gudino, he ordered 1 time dose of Metoprolol 12.5 and OK with pt refusing Spironolactone
provider notified. no new orders.
provider notified. no new orders.
Will continue to monitor as per provider.

## 2023-02-19 RX ORDER — LEVOFLOXACIN 5 MG/ML
1 INJECTION, SOLUTION INTRAVENOUS
Qty: 1 | Refills: 0
Start: 2023-02-19 | End: 2023-02-19

## 2023-02-21 LAB
CULTURE RESULTS: SIGNIFICANT CHANGE UP
FUNGITELL: 34 PG/ML — SIGNIFICANT CHANGE UP

## 2023-02-22 LAB — GALACTOMANNAN AG SERPL-ACNC: 0.05 INDEX — SIGNIFICANT CHANGE UP (ref 0–0.49)

## 2023-02-23 ENCOUNTER — NON-APPOINTMENT (OUTPATIENT)
Age: 83
End: 2023-02-23

## 2023-02-27 ENCOUNTER — NON-APPOINTMENT (OUTPATIENT)
Age: 83
End: 2023-02-27

## 2023-02-27 ENCOUNTER — TRANSCRIPTION ENCOUNTER (OUTPATIENT)
Age: 83
End: 2023-02-27

## 2023-03-14 DIAGNOSIS — I21.9 ACUTE MYOCARDIAL INFARCTION, UNSPECIFIED: ICD-10-CM

## 2023-03-14 DIAGNOSIS — J44.1 CHRONIC OBSTRUCTIVE PULMONARY DISEASE WITH (ACUTE) EXACERBATION: ICD-10-CM

## 2023-03-14 DIAGNOSIS — Z87.01 PERSONAL HISTORY OF PNEUMONIA (RECURRENT): ICD-10-CM

## 2023-03-14 DIAGNOSIS — Z95.5 PRESENCE OF CORONARY ANGIOPLASTY IMPLANT AND GRAFT: ICD-10-CM

## 2023-03-14 RX ORDER — LINAGLIPTIN 5 MG/1
5 TABLET, FILM COATED ORAL DAILY
Refills: 0 | Status: ACTIVE | COMMUNITY

## 2023-03-14 RX ORDER — BUMETANIDE 1 MG/1
1 TABLET ORAL TWICE DAILY
Refills: 0 | Status: ACTIVE | COMMUNITY

## 2023-03-14 RX ORDER — METOPROLOL TARTRATE 50 MG/1
TABLET ORAL TWICE DAILY
Refills: 0 | Status: ACTIVE | COMMUNITY

## 2023-03-14 RX ORDER — TAMSULOSIN HYDROCHLORIDE 0.4 MG/1
0.4 CAPSULE ORAL AT BEDTIME
Refills: 0 | Status: ACTIVE | COMMUNITY

## 2023-03-14 RX ORDER — DUTASTERIDE 0.5 MG/1
0.5 CAPSULE, LIQUID FILLED ORAL DAILY
Refills: 0 | Status: ACTIVE | COMMUNITY

## 2023-03-14 RX ORDER — ALFUZOSIN HYDROCHLORIDE 10 MG/1
10 TABLET, EXTENDED RELEASE ORAL
Qty: 30 | Refills: 0 | Status: DISCONTINUED | COMMUNITY
Start: 2019-08-07 | End: 2023-03-14

## 2023-03-14 RX ORDER — DIGOXIN 125 UG/1
125 TABLET ORAL
Refills: 0 | Status: ACTIVE | COMMUNITY

## 2023-03-14 RX ORDER — APIXABAN 2.5 MG/1
2.5 TABLET, FILM COATED ORAL TWICE DAILY
Refills: 0 | Status: ACTIVE | COMMUNITY

## 2023-03-17 ENCOUNTER — APPOINTMENT (OUTPATIENT)
Dept: CARDIOLOGY | Facility: CLINIC | Age: 83
End: 2023-03-17

## 2023-08-13 ENCOUNTER — INPATIENT (INPATIENT)
Facility: HOSPITAL | Age: 83
LOS: 4 days | Discharge: HOME | End: 2023-08-18
Attending: INTERNAL MEDICINE | Admitting: INTERNAL MEDICINE
Payer: MEDICARE

## 2023-08-13 VITALS
RESPIRATION RATE: 28 BRPM | SYSTOLIC BLOOD PRESSURE: 111 MMHG | OXYGEN SATURATION: 100 % | HEART RATE: 109 BPM | DIASTOLIC BLOOD PRESSURE: 54 MMHG | TEMPERATURE: 98 F

## 2023-08-13 DIAGNOSIS — N40.1 BENIGN PROSTATIC HYPERPLASIA WITH LOWER URINARY TRACT SYMPTOMS: ICD-10-CM

## 2023-08-13 DIAGNOSIS — I50.23 ACUTE ON CHRONIC SYSTOLIC (CONGESTIVE) HEART FAILURE: ICD-10-CM

## 2023-08-13 DIAGNOSIS — E11.9 TYPE 2 DIABETES MELLITUS WITHOUT COMPLICATIONS: ICD-10-CM

## 2023-08-13 DIAGNOSIS — N39.0 URINARY TRACT INFECTION, SITE NOT SPECIFIED: ICD-10-CM

## 2023-08-13 DIAGNOSIS — I50.9 HEART FAILURE, UNSPECIFIED: ICD-10-CM

## 2023-08-13 DIAGNOSIS — I48.20 CHRONIC ATRIAL FIBRILLATION, UNSPECIFIED: ICD-10-CM

## 2023-08-13 LAB
ALBUMIN SERPL ELPH-MCNC: 3.1 G/DL — LOW (ref 3.3–5)
ALP SERPL-CCNC: 98 U/L — SIGNIFICANT CHANGE UP (ref 40–120)
ALT FLD-CCNC: 14 U/L — SIGNIFICANT CHANGE UP (ref 4–41)
ANION GAP SERPL CALC-SCNC: 14 MMOL/L — SIGNIFICANT CHANGE UP (ref 7–14)
APPEARANCE UR: ABNORMAL
APTT BLD: 44.4 SEC — HIGH (ref 24.5–35.6)
AST SERPL-CCNC: 16 U/L — SIGNIFICANT CHANGE UP (ref 4–40)
B PERT DNA SPEC QL NAA+PROBE: SIGNIFICANT CHANGE UP
B PERT+PARAPERT DNA PNL SPEC NAA+PROBE: SIGNIFICANT CHANGE UP
BACTERIA # UR AUTO: NEGATIVE /HPF — SIGNIFICANT CHANGE UP
BASE EXCESS BLDV CALC-SCNC: 6.6 MMOL/L — HIGH (ref -2–3)
BASOPHILS # BLD AUTO: 0.01 K/UL — SIGNIFICANT CHANGE UP (ref 0–0.2)
BASOPHILS NFR BLD AUTO: 0.1 % — SIGNIFICANT CHANGE UP (ref 0–2)
BILIRUB SERPL-MCNC: 0.6 MG/DL — SIGNIFICANT CHANGE UP (ref 0.2–1.2)
BILIRUB UR-MCNC: NEGATIVE — SIGNIFICANT CHANGE UP
BLOOD GAS VENOUS COMPREHENSIVE RESULT: SIGNIFICANT CHANGE UP
BORDETELLA PARAPERTUSSIS (RAPRVP): SIGNIFICANT CHANGE UP
BUN SERPL-MCNC: 71 MG/DL — HIGH (ref 7–23)
C PNEUM DNA SPEC QL NAA+PROBE: SIGNIFICANT CHANGE UP
CALCIUM SERPL-MCNC: 10 MG/DL — SIGNIFICANT CHANGE UP (ref 8.4–10.5)
CAST: 1 /LPF — SIGNIFICANT CHANGE UP (ref 0–4)
CHLORIDE BLDV-SCNC: 102 MMOL/L — SIGNIFICANT CHANGE UP (ref 96–108)
CHLORIDE SERPL-SCNC: 98 MMOL/L — SIGNIFICANT CHANGE UP (ref 98–107)
CO2 BLDV-SCNC: 35.6 MMOL/L — HIGH (ref 22–26)
CO2 SERPL-SCNC: 29 MMOL/L — SIGNIFICANT CHANGE UP (ref 22–31)
COLOR SPEC: YELLOW — SIGNIFICANT CHANGE UP
CREAT SERPL-MCNC: 2.42 MG/DL — HIGH (ref 0.5–1.3)
DIFF PNL FLD: ABNORMAL
DIGOXIN SERPL-MCNC: 0.7 NG/ML — LOW (ref 0.8–2)
EGFR: 26 ML/MIN/1.73M2 — LOW
EOSINOPHIL # BLD AUTO: 0.2 K/UL — SIGNIFICANT CHANGE UP (ref 0–0.5)
EOSINOPHIL NFR BLD AUTO: 1.9 % — SIGNIFICANT CHANGE UP (ref 0–6)
FLUAV SUBTYP SPEC NAA+PROBE: SIGNIFICANT CHANGE UP
FLUBV RNA SPEC QL NAA+PROBE: SIGNIFICANT CHANGE UP
GAS PNL BLDV: 138 MMOL/L — SIGNIFICANT CHANGE UP (ref 136–145)
GAS PNL BLDV: SIGNIFICANT CHANGE UP
GLUCOSE BLDV-MCNC: 158 MG/DL — HIGH (ref 70–99)
GLUCOSE SERPL-MCNC: 152 MG/DL — HIGH (ref 70–99)
GLUCOSE UR QL: NEGATIVE MG/DL — SIGNIFICANT CHANGE UP
HADV DNA SPEC QL NAA+PROBE: SIGNIFICANT CHANGE UP
HCO3 BLDV-SCNC: 34 MMOL/L — HIGH (ref 22–29)
HCOV 229E RNA SPEC QL NAA+PROBE: SIGNIFICANT CHANGE UP
HCOV HKU1 RNA SPEC QL NAA+PROBE: SIGNIFICANT CHANGE UP
HCOV NL63 RNA SPEC QL NAA+PROBE: SIGNIFICANT CHANGE UP
HCOV OC43 RNA SPEC QL NAA+PROBE: SIGNIFICANT CHANGE UP
HCT VFR BLD CALC: 33.6 % — LOW (ref 39–50)
HCT VFR BLDA CALC: 32 % — LOW (ref 39–51)
HGB BLD CALC-MCNC: 10.7 G/DL — LOW (ref 12.6–17.4)
HGB BLD-MCNC: 10.3 G/DL — LOW (ref 13–17)
HMPV RNA SPEC QL NAA+PROBE: SIGNIFICANT CHANGE UP
HPIV1 RNA SPEC QL NAA+PROBE: SIGNIFICANT CHANGE UP
HPIV2 RNA SPEC QL NAA+PROBE: SIGNIFICANT CHANGE UP
HPIV3 RNA SPEC QL NAA+PROBE: SIGNIFICANT CHANGE UP
HPIV4 RNA SPEC QL NAA+PROBE: SIGNIFICANT CHANGE UP
IANC: 7.68 K/UL — HIGH (ref 1.8–7.4)
IMM GRANULOCYTES NFR BLD AUTO: 0.3 % — SIGNIFICANT CHANGE UP (ref 0–0.9)
INR BLD: 1.37 RATIO — HIGH (ref 0.85–1.18)
KETONES UR-MCNC: NEGATIVE MG/DL — SIGNIFICANT CHANGE UP
LACTATE BLDV-MCNC: 0.8 MMOL/L — SIGNIFICANT CHANGE UP (ref 0.5–2)
LEUKOCYTE ESTERASE UR-ACNC: ABNORMAL
LYMPHOCYTES # BLD AUTO: 1.71 K/UL — SIGNIFICANT CHANGE UP (ref 1–3.3)
LYMPHOCYTES # BLD AUTO: 15.8 % — SIGNIFICANT CHANGE UP (ref 13–44)
M PNEUMO DNA SPEC QL NAA+PROBE: SIGNIFICANT CHANGE UP
MAGNESIUM SERPL-MCNC: 2.3 MG/DL — SIGNIFICANT CHANGE UP (ref 1.6–2.6)
MCHC RBC-ENTMCNC: 28.2 PG — SIGNIFICANT CHANGE UP (ref 27–34)
MCHC RBC-ENTMCNC: 30.7 GM/DL — LOW (ref 32–36)
MCV RBC AUTO: 92.1 FL — SIGNIFICANT CHANGE UP (ref 80–100)
MONOCYTES # BLD AUTO: 1.17 K/UL — HIGH (ref 0–0.9)
MONOCYTES NFR BLD AUTO: 10.8 % — SIGNIFICANT CHANGE UP (ref 2–14)
NEUTROPHILS # BLD AUTO: 7.68 K/UL — HIGH (ref 1.8–7.4)
NEUTROPHILS NFR BLD AUTO: 71.1 % — SIGNIFICANT CHANGE UP (ref 43–77)
NITRITE UR-MCNC: NEGATIVE — SIGNIFICANT CHANGE UP
NRBC # BLD: 0 /100 WBCS — SIGNIFICANT CHANGE UP (ref 0–0)
NRBC # FLD: 0 K/UL — SIGNIFICANT CHANGE UP (ref 0–0)
NT-PROBNP SERPL-SCNC: 8605 PG/ML — HIGH
PCO2 BLDV: 61 MMHG — HIGH (ref 42–55)
PH BLDV: 7.35 — SIGNIFICANT CHANGE UP (ref 7.32–7.43)
PH UR: 6 — SIGNIFICANT CHANGE UP (ref 5–8)
PHOSPHATE SERPL-MCNC: 5.3 MG/DL — HIGH (ref 2.5–4.5)
PLATELET # BLD AUTO: 175 K/UL — SIGNIFICANT CHANGE UP (ref 150–400)
PO2 BLDV: 57 MMHG — HIGH (ref 25–45)
POTASSIUM BLDV-SCNC: 4.2 MMOL/L — SIGNIFICANT CHANGE UP (ref 3.5–5.1)
POTASSIUM SERPL-MCNC: 4.1 MMOL/L — SIGNIFICANT CHANGE UP (ref 3.5–5.3)
POTASSIUM SERPL-SCNC: 4.1 MMOL/L — SIGNIFICANT CHANGE UP (ref 3.5–5.3)
PROT SERPL-MCNC: 9.3 G/DL — HIGH (ref 6–8.3)
PROT UR-MCNC: 30 MG/DL
PROTHROM AB SERPL-ACNC: 15.3 SEC — HIGH (ref 9.5–13)
RAPID RVP RESULT: SIGNIFICANT CHANGE UP
RBC # BLD: 3.65 M/UL — LOW (ref 4.2–5.8)
RBC # FLD: 14.1 % — SIGNIFICANT CHANGE UP (ref 10.3–14.5)
RBC CASTS # UR COMP ASSIST: 38 /HPF — HIGH (ref 0–4)
RSV RNA SPEC QL NAA+PROBE: SIGNIFICANT CHANGE UP
RV+EV RNA SPEC QL NAA+PROBE: SIGNIFICANT CHANGE UP
SAO2 % BLDV: 86.8 % — SIGNIFICANT CHANGE UP (ref 67–88)
SARS-COV-2 RNA SPEC QL NAA+PROBE: SIGNIFICANT CHANGE UP
SODIUM SERPL-SCNC: 141 MMOL/L — SIGNIFICANT CHANGE UP (ref 135–145)
SP GR SPEC: 1.01 — SIGNIFICANT CHANGE UP (ref 1–1.03)
SQUAMOUS # UR AUTO: 0 /HPF — SIGNIFICANT CHANGE UP (ref 0–5)
TROPONIN T, HIGH SENSITIVITY RESULT: 44 NG/L — SIGNIFICANT CHANGE UP
TROPONIN T, HIGH SENSITIVITY RESULT: 46 NG/L — SIGNIFICANT CHANGE UP
UROBILINOGEN FLD QL: 0.2 MG/DL — SIGNIFICANT CHANGE UP (ref 0.2–1)
WBC # BLD: 10.8 K/UL — HIGH (ref 3.8–10.5)
WBC # FLD AUTO: 10.8 K/UL — HIGH (ref 3.8–10.5)
WBC UR QL: 299 /HPF — HIGH (ref 0–5)

## 2023-08-13 PROCEDURE — 71045 X-RAY EXAM CHEST 1 VIEW: CPT | Mod: 26

## 2023-08-13 PROCEDURE — 99223 1ST HOSP IP/OBS HIGH 75: CPT

## 2023-08-13 PROCEDURE — 99222 1ST HOSP IP/OBS MODERATE 55: CPT | Mod: GC

## 2023-08-13 PROCEDURE — 99291 CRITICAL CARE FIRST HOUR: CPT

## 2023-08-13 RX ORDER — METOPROLOL TARTRATE 50 MG
12.5 TABLET ORAL
Refills: 0 | Status: COMPLETED | OUTPATIENT
Start: 2023-08-13 | End: 2023-08-15

## 2023-08-13 RX ORDER — INSULIN LISPRO 100/ML
VIAL (ML) SUBCUTANEOUS
Refills: 0 | Status: DISCONTINUED | OUTPATIENT
Start: 2023-08-13 | End: 2023-08-18

## 2023-08-13 RX ORDER — BUMETANIDE 0.25 MG/ML
1 INJECTION INTRAMUSCULAR; INTRAVENOUS ONCE
Refills: 0 | Status: COMPLETED | OUTPATIENT
Start: 2023-08-13 | End: 2023-08-13

## 2023-08-13 RX ORDER — SODIUM CHLORIDE 9 MG/ML
1000 INJECTION, SOLUTION INTRAVENOUS
Refills: 0 | Status: DISCONTINUED | OUTPATIENT
Start: 2023-08-13 | End: 2023-08-18

## 2023-08-13 RX ORDER — LINAGLIPTIN 5 MG/1
1 TABLET, FILM COATED ORAL
Qty: 0 | Refills: 0 | DISCHARGE

## 2023-08-13 RX ORDER — DIGOXIN 250 MCG
125 TABLET ORAL EVERY OTHER DAY
Refills: 0 | Status: DISCONTINUED | OUTPATIENT
Start: 2023-08-13 | End: 2023-08-18

## 2023-08-13 RX ORDER — APIXABAN 2.5 MG/1
2.5 TABLET, FILM COATED ORAL
Refills: 0 | Status: DISCONTINUED | OUTPATIENT
Start: 2023-08-13 | End: 2023-08-18

## 2023-08-13 RX ORDER — ICOSAPENT ETHYL 500 MG/1
0 CAPSULE, LIQUID FILLED ORAL
Qty: 0 | Refills: 0 | DISCHARGE

## 2023-08-13 RX ORDER — PIPERACILLIN AND TAZOBACTAM 4; .5 G/20ML; G/20ML
3.38 INJECTION, POWDER, LYOPHILIZED, FOR SOLUTION INTRAVENOUS ONCE
Refills: 0 | Status: DISCONTINUED | OUTPATIENT
Start: 2023-08-13 | End: 2023-08-13

## 2023-08-13 RX ORDER — DEXTROSE 50 % IN WATER 50 %
15 SYRINGE (ML) INTRAVENOUS ONCE
Refills: 0 | Status: DISCONTINUED | OUTPATIENT
Start: 2023-08-13 | End: 2023-08-18

## 2023-08-13 RX ORDER — VANCOMYCIN HCL 1 G
1000 VIAL (EA) INTRAVENOUS ONCE
Refills: 0 | Status: COMPLETED | OUTPATIENT
Start: 2023-08-13 | End: 2023-08-13

## 2023-08-13 RX ORDER — ASPIRIN/CALCIUM CARB/MAGNESIUM 324 MG
0 TABLET ORAL
Qty: 0 | Refills: 0 | DISCHARGE

## 2023-08-13 RX ORDER — INSULIN LISPRO 100 [IU]/ML
5 INJECTION, SUSPENSION SUBCUTANEOUS
Qty: 0 | Refills: 0 | DISCHARGE

## 2023-08-13 RX ORDER — BUMETANIDE 0.25 MG/ML
1 INJECTION INTRAMUSCULAR; INTRAVENOUS EVERY 12 HOURS
Refills: 0 | Status: DISCONTINUED | OUTPATIENT
Start: 2023-08-13 | End: 2023-08-17

## 2023-08-13 RX ORDER — DEXTROSE 50 % IN WATER 50 %
25 SYRINGE (ML) INTRAVENOUS ONCE
Refills: 0 | Status: DISCONTINUED | OUTPATIENT
Start: 2023-08-13 | End: 2023-08-18

## 2023-08-13 RX ORDER — INSULIN LISPRO 100/ML
VIAL (ML) SUBCUTANEOUS AT BEDTIME
Refills: 0 | Status: DISCONTINUED | OUTPATIENT
Start: 2023-08-13 | End: 2023-08-18

## 2023-08-13 RX ORDER — CHOLECALCIFEROL (VITAMIN D3) 125 MCG
0 CAPSULE ORAL
Qty: 0 | Refills: 0 | DISCHARGE

## 2023-08-13 RX ORDER — TAMSULOSIN HYDROCHLORIDE 0.4 MG/1
0.8 CAPSULE ORAL AT BEDTIME
Refills: 0 | Status: DISCONTINUED | OUTPATIENT
Start: 2023-08-13 | End: 2023-08-18

## 2023-08-13 RX ORDER — CEFEPIME 1 G/1
1000 INJECTION, POWDER, FOR SOLUTION INTRAMUSCULAR; INTRAVENOUS ONCE
Refills: 0 | Status: COMPLETED | OUTPATIENT
Start: 2023-08-13 | End: 2023-08-13

## 2023-08-13 RX ORDER — GLUCAGON INJECTION, SOLUTION 0.5 MG/.1ML
1 INJECTION, SOLUTION SUBCUTANEOUS ONCE
Refills: 0 | Status: DISCONTINUED | OUTPATIENT
Start: 2023-08-13 | End: 2023-08-18

## 2023-08-13 RX ORDER — APIXABAN 2.5 MG/1
1 TABLET, FILM COATED ORAL
Qty: 0 | Refills: 0 | DISCHARGE

## 2023-08-13 RX ORDER — ASPIRIN/CALCIUM CARB/MAGNESIUM 324 MG
81 TABLET ORAL DAILY
Refills: 0 | Status: DISCONTINUED | OUTPATIENT
Start: 2023-08-13 | End: 2023-08-18

## 2023-08-13 RX ORDER — ACETAMINOPHEN 500 MG
650 TABLET ORAL EVERY 6 HOURS
Refills: 0 | Status: DISCONTINUED | OUTPATIENT
Start: 2023-08-13 | End: 2023-08-18

## 2023-08-13 RX ORDER — DIGOXIN 250 MCG
1 TABLET ORAL
Qty: 0 | Refills: 0 | DISCHARGE

## 2023-08-13 RX ORDER — FINASTERIDE 5 MG/1
5 TABLET, FILM COATED ORAL DAILY
Refills: 0 | Status: DISCONTINUED | OUTPATIENT
Start: 2023-08-13 | End: 2023-08-18

## 2023-08-13 RX ORDER — DUTASTERIDE 0.5 MG/1
1 CAPSULE, LIQUID FILLED ORAL
Qty: 0 | Refills: 0 | DISCHARGE

## 2023-08-13 RX ORDER — DEXTROSE 50 % IN WATER 50 %
12.5 SYRINGE (ML) INTRAVENOUS ONCE
Refills: 0 | Status: DISCONTINUED | OUTPATIENT
Start: 2023-08-13 | End: 2023-08-18

## 2023-08-13 RX ORDER — CEFTRIAXONE 500 MG/1
1000 INJECTION, POWDER, FOR SOLUTION INTRAMUSCULAR; INTRAVENOUS EVERY 24 HOURS
Refills: 0 | Status: COMPLETED | OUTPATIENT
Start: 2023-08-13 | End: 2023-08-16

## 2023-08-13 RX ADMIN — APIXABAN 2.5 MILLIGRAM(S): 2.5 TABLET, FILM COATED ORAL at 22:33

## 2023-08-13 RX ADMIN — Medication 250 MILLIGRAM(S): at 05:26

## 2023-08-13 RX ADMIN — CEFEPIME 100 MILLIGRAM(S): 1 INJECTION, POWDER, FOR SOLUTION INTRAMUSCULAR; INTRAVENOUS at 04:29

## 2023-08-13 RX ADMIN — BUMETANIDE 1 MILLIGRAM(S): 0.25 INJECTION INTRAMUSCULAR; INTRAVENOUS at 22:35

## 2023-08-13 RX ADMIN — BUMETANIDE 1 MILLIGRAM(S): 0.25 INJECTION INTRAMUSCULAR; INTRAVENOUS at 10:17

## 2023-08-13 NOTE — CONSULT NOTE ADULT - NS ATTEND AMEND GEN_ALL_CORE FT
Demetria Oneal is an 83-year-old primarily Moroccan speaking man with history of ICM, HFrEF (LVEF 20-25%), refused ICD, Afib on Eliquis, T2DM (on insulin), CKD3, HTN, HLD, BPH and possible COPD. He presented with worsening SOB. In ED, ECG showed afib with RVR at 114 bpm and RBBB, which wasseen with previously. He received cefepime 1gm and vancomycin 1gm in setting of UTI, hypercapnic respiratory failure and evidence of volume overload. VBG lactate 0.8, Dig 0.7, serum proBNP 8605 pg/mL, HStropT 46ng/L, WBC 10.80 k/uL, Scr 2.42 mg/dL. TTE 2/13/23 noted Severe global left ventricular systolic dysfunction. Estimated LVEF in the 20-25% range (by visual estimate). There was mild MR and AR. Pharm stress 2/16/23 noted the LV to be  markdely dilated at baseline. There were large, severe defects in anterior, anteroseptal and apical walls that were fixed, suggestive of infarct. There was medium sized, severe defect in proximal to mid septal wall that was fixed, suggestive of infarct. The best perfusion was in the lateral / inferolateral wall. There was increased RV tracer uptake. LVEF = 22 %; LVEDV = 190 ml, revealing severe overall hypokinesis. There was apical, anterior and anteroseptal akinesis. There was proximal to mid septal akinesis. The best motion was in the lateral and inferolateral walls. RV size and function appeared normal. Initial management bumetanide (Bumex) 2mg IVP X1 and then start Bumex 1mg IV twice daily, aiming for ~1L fluid loss / day. > 2.0 mg/dL. Wean BiPAP as tolerated. Maintain apixaban (Eliquis) 2.5 mg oral twice daily. Maintain digoxin 0.125 mg daily. Hold alfuzosin at this time, as it may provoke for orthostatic hypotension. Watch for urinary retention

## 2023-08-13 NOTE — ED PROVIDER NOTE - CLINICAL SUMMARY MEDICAL DECISION MAKING FREE TEXT BOX
83-year-old male with past medical history of A-fib on Eliquis and digoxin, CHF last EF 30%, DM, HTN, BPH presents emergency department for difficulty breathing and trouble urinating for last 2 days. Pt had episodes of hematuria with difficulty urinating since yesterday. Pt with increased work of breathing and accessory muscle use. Decreased air movement in all lung fields. Pt has hx of aspiration PNA due to difficulty swallowing liquids. + TTP of suprapubic region with mild distention. Differentials include but not limited to CHF exacerbation, aspiration PNA, BPH causing urinary obstruction, kidney stones, UTI. Plan for labs, blood cultures, UA/UC, RVP, Drew placement, BiPAP, CXR. Will cover with broad spectrum abx.  Dispo admission to medicine vs ICU.

## 2023-08-13 NOTE — ED ADULT NURSE REASSESSMENT NOTE - NS ED NURSE REASSESS COMMENT FT1
Patient weaned off Bipap and now placed on NC @3LPM and sating @99%. 16 fr Drew catheter draining clear josi urine 1200ml collected in the U-bag.

## 2023-08-13 NOTE — CONSULT NOTE ADULT - NS ATTEST RISK PROBLEM GEN_ALL_CORE FT
83-year-old primarily Turkmen speaking man with history of ICM, HFrEF (LVEF 20-25%), refused ICD, Afib on Eliquis, T2DM (on insulin), CKD3, HTN, HLD, BPH and possible COPD. He presented with worsening SOB. In ED, ECG showed afib with RVR at 114 bpm and RBBB, which wasseen with previously. He received cefepime 1gm and vancomycin 1gm in setting of UTI, hypercapnic respiratory failure and evidence of volume overload. VBG lactate 0.8, Dig 0.7, serum proBNP 8605 pg/mL, HStropT 46ng/L, WBC 10.80 k/uL, Scr 2.42 mg/dL. TTE 2/13/23 noted Severe global left ventricular systolic dysfunction. Estimated LVEF in the 20-25% range (by visual estimate). There was mild MR and AR. Pharm stress 2/16/23 noted the LV to be  markdely dilated at baseline. There were large, severe defects in anterior, anteroseptal and apical walls that were fixed, suggestive of infarct. There was medium sized, severe defect in proximal to mid septal wall that was fixed, suggestive of infarct. The best perfusion was in the lateral / inferolateral wall. There was increased RV tracer uptake. LVEF = 22 %; LVEDV = 190 ml, revealing severe overall hypokinesis. There was apical, anterior and anteroseptal akinesis. There was proximal to mid septal akinesis. The best motion was in the lateral and inferolateral walls. RV size and function appeared normal.

## 2023-08-13 NOTE — CONSULT NOTE ADULT - TIME BILLING
83-year-old primarily Ghanaian speaking man with history of ICM, HFrEF (LVEF 20-25%), refused ICD, Afib on Eliquis, T2DM (on insulin), CKD3, HTN, HLD, BPH and possible COPD. He presented with worsening SOB. In ED, ECG showed afib with RVR at 114 bpm and RBBB, which wasseen with previously. He received cefepime 1gm and vancomycin 1gm in setting of UTI, hypercapnic respiratory failure and evidence of volume overload. VBG lactate 0.8, Dig 0.7, serum proBNP 8605 pg/mL, HStropT 46ng/L, WBC 10.80 k/uL, Scr 2.42 mg/dL. TTE 2/13/23 noted Severe global left ventricular systolic dysfunction. Estimated LVEF in the 20-25% range (by visual estimate). There was mild MR and AR. Pharm stress 2/16/23 noted the LV to be  markdely dilated at baseline. There were large, severe defects in anterior, anteroseptal and apical walls that were fixed, suggestive of infarct. There was medium sized, severe defect in proximal to mid septal wall that was fixed, suggestive of infarct. The best perfusion was in the lateral / inferolateral wall. There was increased RV tracer uptake. LVEF = 22 %; LVEDV = 190 ml, revealing severe overall hypokinesis. There was apical, anterior and anteroseptal akinesis. There was proximal to mid septal akinesis. The best motion was in the lateral and inferolateral walls. RV size and function appeared normal.

## 2023-08-13 NOTE — H&P ADULT - ASSESSMENT
82 y/o M with pmh of AF on Eliquis, CHF (EF 20-25%) on 2L home O2 DM2, HTN, BPH and kidney stones p/w dyspnea and decreased urination.

## 2023-08-13 NOTE — ED PROVIDER NOTE - ATTENDING CONTRIBUTION TO CARE
83-year-old male past medical history ischemic cardiomyopathy with ejection fraction of 25%, CAD, diabetes, A-fib, CKD, hypertension, lipidemia, A-fib, BPH presenting to emergency department for 2 days of shortness of breath cough with sputum, difficulty urinating with dysuria as well as occasional blood.  Patient did complete by son and daughter-in-law or give majority of history as well as translating at patient's request.  No known sick contacts.  Patient was started on Levaquin by PCP once cough and nasal congestion started several days ago.  Unclear last urination episode.  Patient denies abdominal pain back pain.  Patient reported to be adherent to medications at home.  They report chills but no fevers.  No headache, sore throat, chest pain.  No nausea vomiting diarrhea.  Exam as above lungs: Tachypneic with retractions decreased air movement with scattered crackles bilaterally.  Patient awake alert and oriented    Patient with cough shortness of breath chills.  Reported to have SPO2 20%.  Patient also having difficulty with urination POCUS demonstrating urinary retention.  Review of prior admission demonstrates history of heart failure as well as aspiration pneumonia.  Given congestive heart disease will avoid Zosyn (high sodium content).  Plan: Labs, x-ray, Bumex, antibiotics, BiPAP, Drew for retention, admit.

## 2023-08-13 NOTE — H&P ADULT - NSHPREVIEWOFSYSTEMS_GEN_ALL_CORE
Review of Systems:   CONSTITUTIONAL: No fever or chills  EYES: No eye pain, visual disturbances, or discharge  ENMT:  No difficulty hearing, no throat pain  NECK: No pain or stiffness  RESPIRATORY: No cough, + shortness of breath  CARDIOVASCULAR: No chest pain, palpitations, dizziness, or leg swelling  GASTROINTESTINAL: No abdominal pain, nausea, vomiting or diarrhea  GENITOURINARY: decreased UOP + hematuria  NEUROLOGICAL: No headaches, weakness, or numbness  SKIN: No rashes, or lesions   LYMPH NODES: No enlarged glands  ENDOCRINE: No heat or cold intolerance  MUSCULOSKELETAL: No joint pain or swelling  PSYCHIATRIC: No depression or anxiety  HEME/LYMPH: No easy bruising, or bleeding  ALLERGY AND IMMUNOLOGIC: No hives or eczema

## 2023-08-13 NOTE — H&P ADULT - NSHPPHYSICALEXAM_GEN_ALL_CORE
Vital Signs Last 24 Hrs  T(C): 36.7 (13 Aug 2023 22:26), Max: 36.7 (13 Aug 2023 22:26)  T(F): 98 (13 Aug 2023 22:26), Max: 98 (13 Aug 2023 22:26)  HR: 79 (13 Aug 2023 22:26) (72 - 109)  BP: 96/53 (13 Aug 2023 22:26) (92/50 - 111/54)  BP(mean): 67 (13 Aug 2023 16:02) (67 - 67)  RR: 18 (13 Aug 2023 22:26) (17 - 28)  SpO2: 100% (13 Aug 2023 22:26) (98% - 100%)    Parameters below as of 13 Aug 2023 22:26  Patient On (Oxygen Delivery Method): nasal cannula  O2 Flow (L/min): 3      PHYSICAL EXAM:  GENERAL: No Acute Distress  EYES: conjunctiva and sclera clear  ENMT: Moist mucous membranes   NECK: Supple  PULMONARY: Clear to auscultation bilaterally  CARDIAC: Regular rate and rhythm; No murmurs, rubs, or gallops  GASTROINTESTINAL: Abdomen soft, Nontender, Nondistended; Bowel sounds normal  EXTREMITIES:   No clubbing, cyanosis, or pedal edema  PSYCH: Normal Affect, Normal Behavior  SKIN: No rashes or lesions  MUSCULOSKELETAL: No joint swelling

## 2023-08-13 NOTE — H&P ADULT - PROBLEM SELECTOR PROBLEM 1
Acute on chronic systolic congestive heart failure Abbe Flap (Lower To Upper Lip) Text: The defect of the upper lip was assessed and measured.  Given the location and size of the defect, an Abbe flap was deemed most appropriate.  Using a sterile surgical marker, an appropriate Abbe flap was measured and drawn on the lower lip. Local anesthesia was then infiltrated. A scalpel was then used to incise the upper lip through and through the skin, vermilion, muscle and mucosa, leaving the flap pedicled on the opposite side.  The flap was then rotated and transferred to the lower lip defect.  The flap was then sutured into place with a three layer technique, closing the orbicularis oris muscle layer with subcutaneous buried sutures, followed by a mucosal layer and an epidermal layer.

## 2023-08-13 NOTE — CONSULT NOTE ADULT - SUBJECTIVE AND OBJECTIVE BOX
CC:  Patient is a 83y old  Male who presents with a chief complaint of SOB   HPI:  83M with pmhx of ICM, HFrEF (LVEF 20-25%), refused defibrillator, Afib on Eliquis, T2DM, CKD3, HTN, HLD, BPH, ?COPD presents with c/o worsening SOB. Pt primarily speaks Malian and wants daughter, Cindy 49y/o at bedside to translate. Pt reports he couldn't urinate and didn't eat well due to loss of appetite. He thinks he may have a kidney stone which superimposed on BPH and made him unable to urinate. He endorses, this had happened in the past. He admits to taking all his medications except evening dose of Eliquis last night due to slight hematuria. His family reports patient was taking albuterol neb treatment for past 2 days to help his breathing which didn't improve his symptoms. Pt denies CP, palpitations, lightheadedness, dizziness, n/v/d, or fever.    In ED, /54, ,  RR28, O2sat 100% on NRB, temp 97.8F, EKG shows afib w RBBB , seen with previous EKG. received cefepim 1gm and vanco 1gm. Found to have hypercapnic resp failure, + UTI, and  volume overload. VBG lactate 0.8, Dig 0.7, proBNP 8605, trop 46, WBC 10.80, Scr 2.42. Cardiology consult called for assist in HF management.       Allergies    No Known Allergies    Intolerances    	    MEDICATIONS  Eliquis 2.5mg po BID  digoxin 0.125mg qod  Alfuzosin 10mg daily  Dutasteride 0.5mg daily  Toprol XL 25mg daily  Bumex 1mg po BID  tradjenta ? dosage  montelukast ? dosage  insulin 75/25 ? dosage                        PAST MEDICAL & SURGICAL HISTORY:  DM (diabetes mellitus)      Afib      HTN (hypertension)      H/O CHF      No significant past surgical history          FAMILY HISTORY:  No pertinent family history in first degree relatives        SOCIAL HISTORY    Marital Status:   Occupation:   Lives with: self    SUBSTANCE USE  Tobacco Usage:  ( ) None ( ) never smoked   ( X) former smoker  ( ) current smoker; Packs per day:  stopped >40 years ago.   Alcohol Usage: ( ) none  ( ) occasional ( ) 2-3 times a week ( ) daily; Last drink:   Recreational drugs ( ) None    CONSTITUTIONAL: No fevers, No chills, No fatigue, No weight gain, + loss appetite  EYES: No vision changes   ENT: No congestion, No ear pain, No sore throat.  NECK: No pain, No stiffness  RESPIRATORY: +shortness of breath, No cough, No wheezing, No hemoptysis  CARDIOVASCULAR: No chest pain. No palpitations, No PARIKH, No orthopnea, No paroxysmal nocturnal dyspnea, No pleuritic pain  GASTROINTESTINAL: No abdominal pain, No nausea, No vomiting, No hematemesis, No diarrhea No constipation. No melena  GENITOURINARY: No dysuria, No frequency, No incontinence, + hematuria  NEUROLOGICAL: No dizziness, No lightheadedness, No syncope, No LOC, No headache, No numbness or weakness  MUSCULOSKELETAL: No Edema, No joint pain, No joint swelling.  PSYCHIATRIC: No anxiety, No depression  DERMATOLOGY: No diaphoresis. No itching, No rashes, No pressure ulcers  HEME/LYMPH: No easy bruising, or bleeding gums    All other review of systems is negative unless indicated above.    VITAL SIGNS  T(C): 36.6 (23 @ 02:27), Max: 36.6 (23 @ 02:27)  HR: 96 (23 @ 03:43) (96 - 109)  BP: 111/54 (23 @ 02:27) (111/54 - 111/54)  RR: 28 (23 @ 02:27) (28 - 28)  SpO2: 100% (23 @ 03:43) (100% - 100%)  Wt(kg): --    Appearance: NAD, no distress  HEENT: Moist Mucous Membranes, Anicteric.  Cardiovascular: Irregular rate and rhythm, Normal S1 S2, + JVD, No murmurs  Respiratory: Lungs with crackles bilaterally on auscultation. No rales, No rhonchi, No wheezing. No tenderness to palpation  Gastrointestinal:  Soft, Non-tender, + BS  Neurologic: Non-focal, A&Ox3  Skin: Warm and dry, No rashes, No ecchymosis, No cyanosis  Musculoskeletal: No clubbing, No cyanosis, No edema  Vascular: Peripheral pulses palpable 2+ bilaterally  Psychiatry: Mood & affect appropriate      	    		        I&O's Summary      LABORATORY VALUES	 	                          10.3   10.80 )-----------( 175      ( 13 Aug 2023 04:13 )             33.6           141  |  98  |  71<H>  ----------------------------<  152<H>  4.1   |  29  |  2.42<H>    Ca    10.0      13 Aug 2023 04:13  Phos  5.3       Mg     2.30         TPro  9.3<H>  /  Alb  3.1<L>  /  TBili  0.6  /  DBili  x   /  AST  16  /  ALT  14  /  AlkPhos  98  13    LIVER FUNCTIONS - ( 13 Aug 2023 04:13 )  Alb: 3.1 g/dL / Pro: 9.3 g/dL / ALK PHOS: 98 U/L / ALT: 14 U/L / AST: 16 U/L / GGT: x           Prothrombin Time, Plasma: 15.3 sec ( @ 04:13)      CARDIAC MARKERS:    trop 46        Blood Gas Venous - Lactate: 0.8 mmol/L ( @ 04:13)            Urinalysis Basic - ( 13 Aug 2023 04:25 )    Color: Yellow / Appearance: Cloudy / S.012 / pH: x  Gluc: x / Ketone: Negative mg/dL  / Bili: Negative / Urobili: 0.2 mg/dL   Blood: x / Protein: 30 mg/dL / Nitrite: Negative   Leuk Esterase: Large / RBC: 38 /HPF /  /HPF   Sq Epi: x / Non Sq Epi: 0 /HPF / Bacteria: Negative /HPF      CAPILLARY BLOOD GLUCOSE      POCT Blood Glucose.: 157 mg/dL (13 Aug 2023 02:46)           ECG:  	Afib with RBBB,       RADIOLOGY:  OTHER: 	  < from: Xray Chest 1 View- PORTABLE-Urgent (23 @ 04:25) >    IMPRESSION:    No focal consolidations.        ******PRELIMINARY REPORT******      ******PRELIMINARY REPORT******       EMMA PHILIPPE MD; Resident Radiologist  This document is a PRELIMINARY interpretation and is pending final   attending approval. Aug 13 2023  5:39AM    < end of copied text >    PREVIOUS DIAGNOSTIC TESTING:    [ ] Echocardiogram:  < from: TTE with Doppler (w/Cont) (23 @ 15:39) >  ------------------------------------------------------------------------  CONCLUSIONS:  1. Mitral annular calcification, otherwise normal mitral  valve. Mild mitral regurgitation.  2. Aortic valve not well visualized. Mild aortic  regurgitation.  3. Severe global left ventricular systolic dysfunction.  Estimated LVEF in the 20-25% range (by visual estimate).  Endocardial visualization enhanced with intravenous  injection of echo contrast (Definity).  No LV thrombus  seen.  4. Normal right ventricular size and function.  *** No previous Echo exam.  ------------------------------------------------------------------------  Confirmed on  2023 - 16:54:20 by Otto Oliveros M.D.,  [facility], [place]  ------------------------------------------------------------------------    < end of copied text >      [ ] Stress Test:  	  < from: Nuclear Stress Test-Pharmacologic (Nuclear Stress Test-Pharmacologic .) (23 @ 15:29) >  ------------------------------------------------------------------------  IMPRESSIONS:Abnormal Study  * Myocardial Perfusion SPECT results are abnormal.  * Review of raw data shows: The study is of good technical  quality.  * The leftventricle was markdely dilated at baseline.  There are large, severe defects in anterior and  anteroseptal, apical walls that are fixed, suggestive of  infarct.There is a medium sized, severe defect in proximal  to mid septal wall that is fixed, suggestive of infarct.  The best perfusion was in the lateral / inferolateral  wall. There was increased RV tracer uptake.  * Post-stress gated wall motion analysis was performed  (LVEF = 22 %;LVEDV = 190 ml.), revealing severe overall  hypokinesis. Therewas apical, anterior and anteroseptal  akinesis. There was proximal to mid septal akinesis. The  best motion was in the lateral and inferolateral walls. RV  size and function appeared normal.  ------------------------------------------------------------------------  Confirmed on  2023 - 18:22:21 by Gerardo Weldon M.D.  ------------------------------------------------------------------------    < end of copied text >                 CC:  Patient is a 83y old  Male who presents with a chief complaint of SOB   HPI:  83M with pmhx of ICM, HFrEF (LVEF 20-25%), refused defibrillator, Afib on Eliquis, T2DM, CKD3, HTN, HLD, BPH, ?COPD presents with c/o worsening SOB. Pt primarily speaks Swazi and wants daughter, Cindy 49y/o at bedside to translate. Pt reports he couldn't urinate and didn't eat well due to loss of appetite. He thinks he may have a kidney stone which superimposed on BPH and made him unable to urinate. He endorses, this had happened in the past. He admits to taking all his medications except evening dose of Eliquis last night due to slight hematuria. His family reports patient was taking albuterol neb treatment for past 2 days to help his breathing which didn't improve his symptoms. Pt denies CP, palpitations, lightheadedness, dizziness, n/v/d, or fever.    In ED, /54, ,  RR28, O2sat 100% on NRB, temp 97.8F, EKG shows afib w RBBB , seen with previous EKG. received cefepim 1gm and vanco 1gm. Found to have hypercapnic resp failure, + UTI, and  volume overload. placed on bipap 10/5, 40%. VBG lactate 0.8, Dig 0.7, proBNP 8605, trop 46, WBC 10.80, Scr 2.42. Cardiology consult called for assist in HF management.       Allergies    No Known Allergies    Intolerances    	    MEDICATIONS  Eliquis 2.5mg po BID  digoxin 0.125mg qod  Alfuzosin 10mg daily  Dutasteride 0.5mg daily  Toprol XL 25mg daily  Bumex 1mg po BID  tradjenta ? dosage  montelukast ? dosage  insulin 75/25 ? dosage                        PAST MEDICAL & SURGICAL HISTORY:  DM (diabetes mellitus)      Afib      HTN (hypertension)      H/O CHF      No significant past surgical history          FAMILY HISTORY:  No pertinent family history in first degree relatives        SOCIAL HISTORY    Marital Status:   Occupation:   Lives with: self    SUBSTANCE USE  Tobacco Usage:  ( ) None ( ) never smoked   ( X) former smoker  ( ) current smoker; Packs per day:  stopped >40 years ago.   Alcohol Usage: ( ) none  ( ) occasional ( ) 2-3 times a week ( ) daily; Last drink:   Recreational drugs ( ) None    CONSTITUTIONAL: No fevers, No chills, No fatigue, No weight gain, + loss appetite  EYES: No vision changes   ENT: No congestion, No ear pain, No sore throat.  NECK: No pain, No stiffness  RESPIRATORY: +shortness of breath, No cough, No wheezing, No hemoptysis  CARDIOVASCULAR: No chest pain. No palpitations, No PARIKH, No orthopnea, No paroxysmal nocturnal dyspnea, No pleuritic pain  GASTROINTESTINAL: No abdominal pain, No nausea, No vomiting, No hematemesis, No diarrhea No constipation. No melena  GENITOURINARY: No dysuria, No frequency, No incontinence, + hematuria  NEUROLOGICAL: No dizziness, No lightheadedness, No syncope, No LOC, No headache, No numbness or weakness  MUSCULOSKELETAL: No Edema, No joint pain, No joint swelling.  PSYCHIATRIC: No anxiety, No depression  DERMATOLOGY: No diaphoresis. No itching, No rashes, No pressure ulcers  HEME/LYMPH: No easy bruising, or bleeding gums    All other review of systems is negative unless indicated above.    VITAL SIGNS  T(C): 36.6 (23 @ 02:27), Max: 36.6 (23 @ 02:27)  HR: 96 (23 @ 03:43) (96 - 109)  BP: 111/54 (23 @ 02:27) (111/54 - 111/54)  RR: 28 (23 @ 02:27) (28 - 28)  SpO2: 100% (23 @ 03:43) (100% - 100%)  Wt(kg): --    Appearance: NAD, no distress  HEENT: Moist Mucous Membranes, Anicteric.  Cardiovascular: Irregular rate and rhythm, Normal S1 S2, + JVD, No murmurs  Respiratory: Lungs with crackles bilaterally on auscultation. No rales, No rhonchi, No wheezing. No tenderness to palpation  Gastrointestinal:  Soft, Non-tender, + BS  Neurologic: Non-focal, A&Ox3  Skin: Warm and dry, No rashes, No ecchymosis, No cyanosis  Musculoskeletal: No clubbing, No cyanosis, No edema  Vascular: Peripheral pulses palpable 2+ bilaterally  Psychiatry: Mood & affect appropriate      	    		        I&O's Summary      LABORATORY VALUES	 	                          10.3   10.80 )-----------( 175      ( 13 Aug 2023 04:13 )             33.6           141  |  98  |  71<H>  ----------------------------<  152<H>  4.1   |  29  |  2.42<H>    Ca    10.0      13 Aug 2023 04:13  Phos  5.3       Mg     2.30         TPro  9.3<H>  /  Alb  3.1<L>  /  TBili  0.6  /  DBili  x   /  AST  16  /  ALT  14  /  AlkPhos  98      LIVER FUNCTIONS - ( 13 Aug 2023 04:13 )  Alb: 3.1 g/dL / Pro: 9.3 g/dL / ALK PHOS: 98 U/L / ALT: 14 U/L / AST: 16 U/L / GGT: x           Prothrombin Time, Plasma: 15.3 sec ( @ 04:13)      CARDIAC MARKERS:    trop 46        Blood Gas Venous - Lactate: 0.8 mmol/L ( @ 04:13)            Urinalysis Basic - ( 13 Aug 2023 04:25 )    Color: Yellow / Appearance: Cloudy / S.012 / pH: x  Gluc: x / Ketone: Negative mg/dL  / Bili: Negative / Urobili: 0.2 mg/dL   Blood: x / Protein: 30 mg/dL / Nitrite: Negative   Leuk Esterase: Large / RBC: 38 /HPF /  /HPF   Sq Epi: x / Non Sq Epi: 0 /HPF / Bacteria: Negative /HPF      CAPILLARY BLOOD GLUCOSE      POCT Blood Glucose.: 157 mg/dL (13 Aug 2023 02:46)           ECG:  	Afib with RBBB,       RADIOLOGY:  OTHER: 	  < from: Xray Chest 1 View- PORTABLE-Urgent (23 @ 04:25) >    IMPRESSION:    No focal consolidations.        ******PRELIMINARY REPORT******      ******PRELIMINARY REPORT******       EMMA PHILIPPE MD; Resident Radiologist  This document is a PRELIMINARY interpretation and is pending final   attending approval. Aug 13 2023  5:39AM    < end of copied text >    PREVIOUS DIAGNOSTIC TESTING:    [ ] Echocardiogram:  < from: TTE with Doppler (w/Cont) (23 @ 15:39) >  ------------------------------------------------------------------------  CONCLUSIONS:  1. Mitral annular calcification, otherwise normal mitral  valve. Mild mitral regurgitation.  2. Aortic valve not well visualized. Mild aortic  regurgitation.  3. Severe global left ventricular systolic dysfunction.  Estimated LVEF in the 20-25% range (by visual estimate).  Endocardial visualization enhanced with intravenous  injection of echo contrast (Definity).  No LV thrombus  seen.  4. Normal right ventricular size and function.  *** No previous Echo exam.  ------------------------------------------------------------------------  Confirmed on  2023 - 16:54:20 by Otto Oliveros M.D.,  [facility], [place]  ------------------------------------------------------------------------    < end of copied text >      [ ] Stress Test:  	  < from: Nuclear Stress Test-Pharmacologic (Nuclear Stress Test-Pharmacologic .) (23 @ 15:29) >  ------------------------------------------------------------------------  IMPRESSIONS:Abnormal Study  * Myocardial Perfusion SPECT results are abnormal.  * Review of raw data shows: The study is of good technical  quality.  * The leftventricle was markdely dilated at baseline.  There are large, severe defects in anterior and  anteroseptal, apical walls that are fixed, suggestive of  infarct.There is a medium sized, severe defect in proximal  to mid septal wall that is fixed, suggestive of infarct.  The best perfusion was in the lateral / inferolateral  wall. There was increased RV tracer uptake.  * Post-stress gated wall motion analysis was performed  (LVEF = 22 %;LVEDV = 190 ml.), revealing severe overall  hypokinesis. Therewas apical, anterior and anteroseptal  akinesis. There was proximal to mid septal akinesis. The  best motion was in the lateral and inferolateral walls. RV  size and function appeared normal.  ------------------------------------------------------------------------  Confirmed on  2023 - 18:22:21 by Gerardo Weldon M.D.  ------------------------------------------------------------------------    < end of copied text >                 CC:  Patient is a 83y old  Male who presents with a chief complaint of SOB   HPI:  83M with pmhx of ICM, HFrEF (LVEF 20-25%), refused defibrillator, Afib on Eliquis, T2DM, CKD3, HTN, HLD, BPH, ?COPD (smoker for 30 years, stopped 40 yrs ago), presents with c/o worsening SOB. Pt primarily speaks Maldivian and wants daughter, Cindy 49y/o at bedside to translate. Pt reports he couldn't urinate and didn't eat well due to loss of appetite. He thinks he may have a kidney stone which superimposed on BPH and made him unable to urinate. He endorses, this had happened in the past. He admits to taking all his medications except evening dose of Eliquis last night due to slight hematuria. His family reports patient was taking albuterol neb treatment for past 2 days to help his breathing which didn't improve his symptoms. Pt denies CP, palpitations, lightheadedness, dizziness, n/v/d, or fever.    In ED, /54, ,  RR28, O2sat 100% on NRB, temp 97.8F, EKG shows afib w RBBB , seen with previous EKG. received cefepim 1gm and vanco 1gm. Found to have hypercapnic resp failure, + UTI, and  volume overload. placed on bipap 10/5, 40%. VBG lactate 0.8, Dig 0.7, proBNP 8605, trop 46, WBC 10.80, Scr 2.42. Cardiology consult called for assist in HF management.       Allergies    No Known Allergies    Intolerances    	    MEDICATIONS  Eliquis 2.5mg po BID  digoxin 0.125mg qod  Alfuzosin 10mg daily  Dutasteride 0.5mg daily  Toprol XL 25mg daily  Bumex 1mg po BID  tradjenta ? dosage  montelukast ? dosage  insulin 75/25 ? dosage                        PAST MEDICAL & SURGICAL HISTORY:  DM (diabetes mellitus)      Afib      HTN (hypertension)      H/O CHF      No significant past surgical history          FAMILY HISTORY:  No pertinent family history in first degree relatives        SOCIAL HISTORY    Marital Status:   Occupation:   Lives with: self    SUBSTANCE USE  Tobacco Usage:  ( ) None ( ) never smoked   ( X) former smoker  ( ) current smoker; Packs per day:  stopped >40 years ago.   Alcohol Usage: ( ) none  ( ) occasional ( ) 2-3 times a week ( ) daily; Last drink:   Recreational drugs ( ) None    CONSTITUTIONAL: No fevers, No chills, No fatigue, No weight gain, + loss appetite  EYES: No vision changes   ENT: No congestion, No ear pain, No sore throat.  NECK: No pain, No stiffness  RESPIRATORY: +shortness of breath, No cough, No wheezing, No hemoptysis  CARDIOVASCULAR: No chest pain. No palpitations, No PARIKH, No orthopnea, No paroxysmal nocturnal dyspnea, No pleuritic pain  GASTROINTESTINAL: No abdominal pain, No nausea, No vomiting, No hematemesis, No diarrhea No constipation. No melena  GENITOURINARY: No dysuria, No frequency, No incontinence, + hematuria  NEUROLOGICAL: No dizziness, No lightheadedness, No syncope, No LOC, No headache, No numbness or weakness  MUSCULOSKELETAL: No Edema, No joint pain, No joint swelling.  PSYCHIATRIC: No anxiety, No depression  DERMATOLOGY: No diaphoresis. No itching, No rashes, No pressure ulcers  HEME/LYMPH: No easy bruising, or bleeding gums    All other review of systems is negative unless indicated above.    VITAL SIGNS  T(C): 36.6 (23 @ 02:27), Max: 36.6 (23 @ 02:27)  HR: 96 (23 @ 03:43) (96 - 109)  BP: 111/54 (23 @ 02:27) (111/54 - 111/54)  RR: 28 (23 @ 02:27) (28 - 28)  SpO2: 100% (23 @ 03:43) (100% - 100%)  Wt(kg): --    Appearance: NAD, no distress  HEENT: Moist Mucous Membranes, Anicteric.  Cardiovascular: Irregular rate and rhythm, Normal S1 S2, + JVD, No murmurs  Respiratory: Lungs with crackles bilaterally on auscultation. No rales, No rhonchi, No wheezing. No tenderness to palpation  Gastrointestinal:  Soft, Non-tender, + BS  Neurologic: Non-focal, A&Ox3  Skin: Warm and dry, No rashes, No ecchymosis, No cyanosis  Musculoskeletal: No clubbing, No cyanosis, No edema  Vascular: Peripheral pulses palpable 2+ bilaterally  Psychiatry: Mood & affect appropriate      	    		        I&O's Summary      LABORATORY VALUES	 	                          10.3   10.80 )-----------( 175      ( 13 Aug 2023 04:13 )             33.6           141  |  98  |  71<H>  ----------------------------<  152<H>  4.1   |  29  |  2.42<H>    Ca    10.0      13 Aug 2023 04:13  Phos  5.3       Mg     2.30         TPro  9.3<H>  /  Alb  3.1<L>  /  TBili  0.6  /  DBili  x   /  AST  16  /  ALT  14  /  AlkPhos  98      LIVER FUNCTIONS - ( 13 Aug 2023 04:13 )  Alb: 3.1 g/dL / Pro: 9.3 g/dL / ALK PHOS: 98 U/L / ALT: 14 U/L / AST: 16 U/L / GGT: x           Prothrombin Time, Plasma: 15.3 sec ( @ 04:13)      CARDIAC MARKERS:    trop 46        Blood Gas Venous - Lactate: 0.8 mmol/L ( @ 04:13)            Urinalysis Basic - ( 13 Aug 2023 04:25 )    Color: Yellow / Appearance: Cloudy / S.012 / pH: x  Gluc: x / Ketone: Negative mg/dL  / Bili: Negative / Urobili: 0.2 mg/dL   Blood: x / Protein: 30 mg/dL / Nitrite: Negative   Leuk Esterase: Large / RBC: 38 /HPF /  /HPF   Sq Epi: x / Non Sq Epi: 0 /HPF / Bacteria: Negative /HPF      CAPILLARY BLOOD GLUCOSE      POCT Blood Glucose.: 157 mg/dL (13 Aug 2023 02:46)           ECG:  	Afib with RBBB,       RADIOLOGY:  OTHER: 	  < from: Xray Chest 1 View- PORTABLE-Urgent (23 @ 04:25) >    IMPRESSION:    No focal consolidations.        ******PRELIMINARY REPORT******      ******PRELIMINARY REPORT******       EMMA PHILIPPE MD; Resident Radiologist  This document is a PRELIMINARY interpretation and is pending final   attending approval. Aug 13 2023  5:39AM    < end of copied text >    PREVIOUS DIAGNOSTIC TESTING:    [ ] Echocardiogram:  < from: TTE with Doppler (w/Cont) (23 @ 15:39) >  ------------------------------------------------------------------------  CONCLUSIONS:  1. Mitral annular calcification, otherwise normal mitral  valve. Mild mitral regurgitation.  2. Aortic valve not well visualized. Mild aortic  regurgitation.  3. Severe global left ventricular systolic dysfunction.  Estimated LVEF in the 20-25% range (by visual estimate).  Endocardial visualization enhanced with intravenous  injection of echo contrast (Definity).  No LV thrombus  seen.  4. Normal right ventricular size and function.  *** No previous Echo exam.  ------------------------------------------------------------------------  Confirmed on  2023 - 16:54:20 by Otto Oliveros M.D.,  [facility], [place]  ------------------------------------------------------------------------    < end of copied text >      [ ] Stress Test:  	  < from: Nuclear Stress Test-Pharmacologic (Nuclear Stress Test-Pharmacologic .) (23 @ 15:29) >  ------------------------------------------------------------------------  IMPRESSIONS:Abnormal Study  * Myocardial Perfusion SPECT results are abnormal.  * Review of raw data shows: The study is of good technical  quality.  * The leftventricle was markdely dilated at baseline.  There are large, severe defects in anterior and  anteroseptal, apical walls that are fixed, suggestive of  infarct.There is a medium sized, severe defect in proximal  to mid septal wall that is fixed, suggestive of infarct.  The best perfusion was in the lateral / inferolateral  wall. There was increased RV tracer uptake.  * Post-stress gated wall motion analysis was performed  (LVEF = 22 %;LVEDV = 190 ml.), revealing severe overall  hypokinesis. Therewas apical, anterior and anteroseptal  akinesis. There was proximal to mid septal akinesis. The  best motion was in the lateral and inferolateral walls. RV  size and function appeared normal.  ------------------------------------------------------------------------  Confirmed on  2023 - 18:22:21 by Gerardo Weldon M.D.  ------------------------------------------------------------------------    < end of copied text >                 CC:  Patient is a 83y old  Male who presents with a chief complaint of SOB   HPI:  83M with pmhx of ICM, HFrEF (LVEF 20-25%), refused defibrillator, Afib on Eliquis, T2DM, CKD3, HTN, HLD, BPH, ?COPD (smoker for 30 years, stopped 40 yrs ago), presents with c/o worsening SOB. Pt primarily speaks Ivorian and wants daughter, Cindy 51y/o at bedside to translate. Pt reports he couldn't urinate and didn't eat well due to loss of appetite. He thinks he may have a kidney stone which superimposed on BPH and made him unable to urinate. He endorses, this had happened in the past. He admits to taking all his medications except evening dose of Eliquis last night due to slight hematuria. His family reports patient was taking albuterol neb treatment for past 2 days to help his breathing which didn't improve his symptoms. Pt denies CP, palpitations, lightheadedness, dizziness, n/v/d, or fever.    In ED, /54, ,  RR28, O2sat 100% on NRB, temp 97.8F, EKG shows afib w RBBB , seen with previous EKG. received cefepim 1gm and vanco 1gm. Found to have hypercapnic resp failure, + UTI, and  volume overload. placed on bipap 10/5, 40%. VBG lactate 0.8, Dig 0.7, proBNP 8605, trop 46, WBC 10.80, Scr 2.42. Cardiology consult called for assist in HF management.       Allergies    No Known Allergies    Intolerances    	    MEDICATIONS  Eliquis 2.5mg po BID  digoxin 0.125mg qod  Alfuzosin 10mg daily  Dutasteride 0.5mg daily  Toprol XL 25mg daily  Bumex 1mg po BID  tradjenta ? dosage  montelukast ? dosage  insulin 75/25 ? dosage  Flovent HFA -inhaler   Ventolin HFA- inhaler                      PAST MEDICAL & SURGICAL HISTORY:  DM (diabetes mellitus)      Afib      HTN (hypertension)      H/O CHF      No significant past surgical history          FAMILY HISTORY:  No pertinent family history in first degree relatives        SOCIAL HISTORY    Marital Status:   Occupation:   Lives with: self    SUBSTANCE USE  Tobacco Usage:  ( ) None ( ) never smoked   ( X) former smoker  ( ) current smoker; Packs per day:  stopped >40 years ago.   Alcohol Usage: ( ) none  ( ) occasional ( ) 2-3 times a week ( ) daily; Last drink:   Recreational drugs ( ) None    CONSTITUTIONAL: No fevers, No chills, No fatigue, No weight gain, + loss appetite  EYES: No vision changes   ENT: No congestion, No ear pain, No sore throat.  NECK: No pain, No stiffness  RESPIRATORY: +shortness of breath, No cough, No wheezing, No hemoptysis  CARDIOVASCULAR: No chest pain. No palpitations, No PARIKH, No orthopnea, No paroxysmal nocturnal dyspnea, No pleuritic pain  GASTROINTESTINAL: No abdominal pain, No nausea, No vomiting, No hematemesis, No diarrhea No constipation. No melena  GENITOURINARY: No dysuria, No frequency, No incontinence, + hematuria  NEUROLOGICAL: No dizziness, No lightheadedness, No syncope, No LOC, No headache, No numbness or weakness  MUSCULOSKELETAL: No Edema, No joint pain, No joint swelling.  PSYCHIATRIC: No anxiety, No depression  DERMATOLOGY: No diaphoresis. No itching, No rashes, No pressure ulcers  HEME/LYMPH: No easy bruising, or bleeding gums    All other review of systems is negative unless indicated above.    VITAL SIGNS  T(C): 36.6 (23 @ 02:27), Max: 36.6 (23 @ 02:27)  HR: 96 (23 @ 03:43) (96 - 109)  BP: 111/54 (23 @ 02:27) (111/54 - 111/54)  RR: 28 (23 @ 02:27) (28 - 28)  SpO2: 100% (23 @ 03:43) (100% - 100%)  Wt(kg): --    Appearance: NAD, no distress  HEENT: Moist Mucous Membranes, Anicteric.  Cardiovascular: Irregular rate and rhythm, Normal S1 S2, + JVD, No murmurs  Respiratory: Lungs with crackles bilaterally on auscultation. No rales, No rhonchi, No wheezing. No tenderness to palpation  Gastrointestinal:  Soft, Non-tender, + BS  Neurologic: Non-focal, A&Ox3  Skin: Warm and dry, No rashes, No ecchymosis, No cyanosis  Musculoskeletal: No clubbing, No cyanosis, No edema  Vascular: Peripheral pulses palpable 2+ bilaterally  Psychiatry: Mood & affect appropriate      	    		        I&O's Summary      LABORATORY VALUES	 	                          10.3   10.80 )-----------( 175      ( 13 Aug 2023 04:13 )             33.6           141  |  98  |  71<H>  ----------------------------<  152<H>  4.1   |  29  |  2.42<H>    Ca    10.0      13 Aug 2023 04:13  Phos  5.3       Mg     2.30         TPro  9.3<H>  /  Alb  3.1<L>  /  TBili  0.6  /  DBili  x   /  AST  16  /  ALT  14  /  AlkPhos  98      LIVER FUNCTIONS - ( 13 Aug 2023 04:13 )  Alb: 3.1 g/dL / Pro: 9.3 g/dL / ALK PHOS: 98 U/L / ALT: 14 U/L / AST: 16 U/L / GGT: x           Prothrombin Time, Plasma: 15.3 sec ( @ 04:13)      CARDIAC MARKERS:    trop 46        Blood Gas Venous - Lactate: 0.8 mmol/L ( @ 04:13)            Urinalysis Basic - ( 13 Aug 2023 04:25 )    Color: Yellow / Appearance: Cloudy / S.012 / pH: x  Gluc: x / Ketone: Negative mg/dL  / Bili: Negative / Urobili: 0.2 mg/dL   Blood: x / Protein: 30 mg/dL / Nitrite: Negative   Leuk Esterase: Large / RBC: 38 /HPF /  /HPF   Sq Epi: x / Non Sq Epi: 0 /HPF / Bacteria: Negative /HPF      CAPILLARY BLOOD GLUCOSE      POCT Blood Glucose.: 157 mg/dL (13 Aug 2023 02:46)           ECG:  	Afib with RBBB,       RADIOLOGY:  OTHER: 	  < from: Xray Chest 1 View- PORTABLE-Urgent (23 @ 04:25) >    IMPRESSION:    No focal consolidations.        ******PRELIMINARY REPORT******      ******PRELIMINARY REPORT******       EMMA PHILIPPE MD; Resident Radiologist  This document is a PRELIMINARY interpretation and is pending final   attending approval. Aug 13 2023  5:39AM    < end of copied text >    PREVIOUS DIAGNOSTIC TESTING:    [ ] Echocardiogram:  < from: TTE with Doppler (w/Cont) (23 @ 15:39) >  ------------------------------------------------------------------------  CONCLUSIONS:  1. Mitral annular calcification, otherwise normal mitral  valve. Mild mitral regurgitation.  2. Aortic valve not well visualized. Mild aortic  regurgitation.  3. Severe global left ventricular systolic dysfunction.  Estimated LVEF in the 20-25% range (by visual estimate).  Endocardial visualization enhanced with intravenous  injection of echo contrast (Definity).  No LV thrombus  seen.  4. Normal right ventricular size and function.  *** No previous Echo exam.  ------------------------------------------------------------------------  Confirmed on  2023 - 16:54:20 by Otto Oliveros M.D.,  [facility], [facility]  ------------------------------------------------------------------------    < end of copied text >      [ ] Stress Test:  	  < from: Nuclear Stress Test-Pharmacologic (Nuclear Stress Test-Pharmacologic .) (23 @ 15:29) >  ------------------------------------------------------------------------  IMPRESSIONS:Abnormal Study  * Myocardial Perfusion SPECT results are abnormal.  * Review of raw data shows: The study is of good technical  quality.  * The leftventricle was markdely dilated at baseline.  There are large, severe defects in anterior and  anteroseptal, apical walls that are fixed, suggestive of  infarct.There is a medium sized, severe defect in proximal  to mid septal wall that is fixed, suggestive of infarct.  The best perfusion was in the lateral / inferolateral  wall. There was increased RV tracer uptake.  * Post-stress gated wall motion analysis was performed  (LVEF = 22 %;LVEDV = 190 ml.), revealing severe overall  hypokinesis. Therewas apical, anterior and anteroseptal  akinesis. There was proximal to mid septal akinesis. The  best motion was in the lateral and inferolateral walls. RV  size and function appeared normal.  ------------------------------------------------------------------------  Confirmed on  2023 - 18:22:21 by Gerardo Weldon M.D.  ------------------------------------------------------------------------    < end of copied text >

## 2023-08-13 NOTE — H&P ADULT - PROBLEM SELECTOR PLAN 3
- s/p Drew catheter  - continue tamsulosin and dutasteride  - check US kidney/ureter 2/2 concern for kidney stones and ? hx of flank pain

## 2023-08-13 NOTE — ED PROVIDER NOTE - OBJECTIVE STATEMENT
83-year-old male with past medical history of A-fib on Eliquis and digoxin, CHF last EF 30%, DM, HTN, BPH presents emergency department for difficulty breathing and trouble urinating for last 2 days.  Patient is accompanied by son and daughter, they are providing translation per patient request.  Patient has had increased difficulty breathing over the last 1 day despite nebulizer treatments at home.  Patient normally on 2 L of home O2.  Patient also had coughing sneezing and congestion over the last couple of days and was placed on oral antibiotics.  Home health aide also noted that patient was having hematuria starting today with passage of small clots.  Patient since has been having difficulty urinating.  Patient endorsing chills but no recorded fevers.  Denies headache, vision changes, chest pain, nausea/vomiting, diarrhea/constipation.  Patient also endorsing abdominal discomfort

## 2023-08-13 NOTE — H&P ADULT - TIME BILLING
Preparing to see the patient including review of tests and other providers' notes, confirming history with patient/family member, performing medical examination and evaluation, counseling and educating the patient/family, ordering medications, tests and procedures, communicating with other health care professionals, documenting clinical information in the EMR, independently interpreting results and communicating results to the patient/family, care coordination

## 2023-08-13 NOTE — H&P ADULT - NSHPLABSRESULTS_GEN_ALL_CORE
10.3   10.80 )-----------( 175      ( 13 Aug 2023 04:13 )             33.6     141  |  98  |  71<H>  ----------------------------<  152<H>       4.1   |  29  |  2.42<H>    Ca    10.0      13 Aug 2023 04:13  Phos  5.3       Mg     2.30         TPro  9.3<H>  /  Alb  3.1<L>  /  TBili  0.6  /  DBili  x   /  AST  16  /  ALT  14  /  AlkPhos  98      PT/INR: 15.3/1.37 (23 @ 04:13)  PTT: 44.4 (23 @ 04:13)      04:13 - VBG - pH: 7.35  | pCO2: 61    | pO2: 57    | Lactate: 0.8            hs Troponin, T - 44 ng/L (23 @ 06:25)  hs Troponin, T - 46 ng/L (23 @ 04:13)      Pro-BNP: 8605 (23 @ 04:13)          Urinalysis Basic - ( 13 Aug 2023 04:25 )  Color: Yellow / Appearance: Cloudy / S.012 / pH: 6.0  Gluc: Negative mg/dL / Ketone: Negative mg/dL  / Bili: Negative / Urobili: 0.2 mg/dL   Blood: Large / Protein: 30 mg/dL / Nitrite: Negative   Leuk Esterase: Large / RBC: 38 /HPF /  /HPF   Sq Epi: x / Non Sq Epi: x / Bacteria: Negative /HPF 10.3   10.80 )-----------( 175      ( 13 Aug 2023 04:13 )             33.6     141  |  98  |  71<H>  ----------------------------<  152<H>       4.1   |  29  |  2.42<H>    Ca    10.0      13 Aug 2023 04:13  Phos  5.3       Mg     2.30         TPro  9.3<H>  /  Alb  3.1<L>  /  TBili  0.6  /  DBili  x   /  AST  16  /  ALT  14  /  AlkPhos  98      PT/INR: 15.3/1.37 (23 @ 04:13)  PTT: 44.4 (23 @ 04:13)      04:13 - VBG - pH: 7.35  | pCO2: 61    | pO2: 57    | Lactate: 0.8            hs Troponin, T - 44 ng/L (23 @ 06:25)  hs Troponin, T - 46 ng/L (23 @ 04:13)      Pro-BNP: 8605 (23 @ 04:13)          Urinalysis Basic - ( 13 Aug 2023 04:25 )  Color: Yellow / Appearance: Cloudy / S.012 / pH: 6.0  Gluc: Negative mg/dL / Ketone: Negative mg/dL  / Bili: Negative / Urobili: 0.2 mg/dL   Blood: Large / Protein: 30 mg/dL / Nitrite: Negative   Leuk Esterase: Large / RBC: 38 /HPF /  /HPF   Sq Epi: x / Non Sq Epi: x / Bacteria: Negative /HPF    CXR film reviewed: clear lungs    EKG tracing reviewed: AF with RVR

## 2023-08-13 NOTE — ED ADULT NURSE REASSESSMENT NOTE - NS ED NURSE REASSESS COMMENT FT1
Received report from night shift RN Nadiya. Pt resting comfortably in stretcher, arousable to verbal stimuli. Tolerating BiPAP mask. SpO2 99% on BiPAP. A. FIb. on CM. Family at bedside. Pt admitted. Pending bed assignment.

## 2023-08-13 NOTE — ED PROVIDER NOTE - PHYSICAL EXAMINATION
Constitutional: VS reviewed. Alert and orientedx3, pt tachypneic and having trouble breathing   HEENT: Atraumatic, EOMI, PERRL   CV: Irregularly irregular to 110s.   Lungs: Increased work of breathing with intercostal retractions and belly breathing. Decreased air movement in all lung fields.   Abdomen: Soft, mildly distended with suprapubic TTP. No rebound or guarding.   MSK: No deformities  Skin: Warm and dry. As visualized no rashes, lesions, bruising or erythema  Neuro: Strength 5/5 in all extremities. Sensation intact.   Lymph: No pitting edema in extremities.

## 2023-08-13 NOTE — ED ADULT NURSE NOTE - OBJECTIVE STATEMENT
Pt is A&O x 3, maintained on bedrest, presents to the ED w/ worsening SOB and dysuria x 2 days. Noted to be hypoxic and tachypneic upon arrival to the ED, VSS otherwise. Placed on Bipap. Continuous pulse oximetry and telemetry in place. Noted to be retaining urine via US of the bladder at bedside. 16f coude indwelling urinary catheter placed w/ approx 500 cc urine output. Left AC 20 gauge placed and labs drawn. IV anti-bx initiated. Pending CXR.

## 2023-08-13 NOTE — ED PROCEDURE NOTE - PROCEDURE ADDITIONAL DETAILS
Echo: Severely reduced left ventricular ejection fraction.  No evidence of pericardial effusion.  Chest: Bilateral B-lines in upper and lower lung fields.  No pleural effusion.  Bladder.  There is evidence of urinary retention.

## 2023-08-13 NOTE — H&P ADULT - HISTORY OF PRESENT ILLNESS
84 y/o M with pmh of AF on Eliquis, CHF (EF 20-25%) on 2L home O2 DM2, HTN, BPH and kidney stones p/w dyspnea and decreased urination.  Pt's son reports that pt had dyspnea over the past 2-3 days.  Pt's son also reported that pt had decreased urination, and that he noticed blood and small stones in urine.  Son checked pt's O2 sat which was 88% today.  Pt reports no chest pain, subjective fever or chills.  He reported some abdominal pain and flank pain, but states he does not have pain now.  No N/V/D.      In the ED, pt was initially started on BIPAP.  He was evaluated by cardiology.  He had postive UA.  He was given vancomycin and cefepime, and IV Bumex.    84 y/o M with pmh of AF on Eliquis, CHF (EF 20-25%) on 2L home O2 DM2, HTN, BPH and kidney stones p/w dyspnea and decreased urination.  Pt's son reports that pt had dyspnea over the past 2-3 days.  Pt's son also reported that pt had decreased urination, and that he noticed blood and small stones in urine.  Son checked pt's O2 sat which was 88% today.  Pt reports no chest pain, subjective fever or chills.  He reported some abdominal pain and flank pain, but states he does not have pain now.  No N/V/D.  Son states he gave albuterol neb at home, but pt became very tachycardic.     In the ED, pt was initially started on BIPAP.  He was evaluated by cardiology.  He had postive UA.  He was given vancomycin and cefepime, and IV Bumex.

## 2023-08-13 NOTE — ED ADULT TRIAGE NOTE - CHIEF COMPLAINT QUOTE
pt c/o worsening shortness of breath (80% room air on scene) and urinary symptoms x 1-2 days (hematuria, dysuria, difficulty starting stream). PHx DM, CHF, HTN, afib on Eliquis

## 2023-08-13 NOTE — ED ADULT NURSE NOTE - NSFALLHARMRISKINTERV_ED_ALL_ED

## 2023-08-13 NOTE — ED ADULT NURSE REASSESSMENT NOTE - NS ED NURSE REASSESS COMMENT FT1
Received patient in bed Aox3 on bipap 10/5 40% FIO2.  Came in for SOB and due to exertion. Hist of CHF, AFIB, and DM. Sating @80% In room air at home. Patient is being admitted for fluid overload due to CHF. sating @100 on bipap .  Normal sinus on the cardia  monitor. Additional Bumex will be ordered as per NP Yevgeniy. Will continue to monitor

## 2023-08-13 NOTE — ED PROVIDER NOTE - PROGRESS NOTE DETAILS
Sarahi Tijerina PGY2: US on initial eval showed bladder distention. Drew placed and drained cloudy yellow urine. Sarahi Tijerina PGY2: Pt with significant improvement in respiratory status. RR 20 and tolerating bipap well. Pt TBA.

## 2023-08-13 NOTE — CONSULT NOTE ADULT - ASSESSMENT
ASSESSMENT AND PLAN  83M with pmhx of ICM, HFrEF (LVEF 20-25%), refused defibrillator, Afib on Eliquis, T2DM, CKD3, HTN, HLD, BPH, ?COPD presents with c/o worsening SOB. Cardiology consult called for assist in HF management.         PLAN  #?ADHF - likely I/S/O infectious etiology, +UTI.   -continuous tele monitoring  -EKG shows afib w RBBB , seen with previous EKG.  -on exam, pt seems VOL, but not in a low flow state (+JVD, mariela crackles, no LE edema and warm to touch. Lactate 0.8)  -give Bumex 2mg ivp X1, then start bumex 1mg iv BID  -proBNP 8605, trop 46, Scr 2.42  -check 2nd trop with next lab draw.  -wean bipap as tolerated.   -strict i's and o's  -echo in am  -dig level 0.7  - c/w home dig, toprol, eliquis  -replete electrolyte prn  -house cardiology will follow, call # [phone] for questions.    Case discussed with Dr. Danielle Boudreaux  Attending attestation to follow.

## 2023-08-14 LAB
CULTURE RESULTS: SIGNIFICANT CHANGE UP
SPECIMEN SOURCE: SIGNIFICANT CHANGE UP

## 2023-08-14 PROCEDURE — 93306 TTE W/DOPPLER COMPLETE: CPT | Mod: 26

## 2023-08-14 PROCEDURE — 99233 SBSQ HOSP IP/OBS HIGH 50: CPT

## 2023-08-14 PROCEDURE — 76770 US EXAM ABDO BACK WALL COMP: CPT | Mod: 26

## 2023-08-14 RX ORDER — CALCIUM CARBONATE 500(1250)
1 TABLET ORAL ONCE
Refills: 0 | Status: COMPLETED | OUTPATIENT
Start: 2023-08-14 | End: 2023-08-14

## 2023-08-14 RX ADMIN — Medication 4: at 12:37

## 2023-08-14 RX ADMIN — BUMETANIDE 1 MILLIGRAM(S): 0.25 INJECTION INTRAMUSCULAR; INTRAVENOUS at 22:09

## 2023-08-14 RX ADMIN — FINASTERIDE 5 MILLIGRAM(S): 5 TABLET, FILM COATED ORAL at 12:36

## 2023-08-14 RX ADMIN — CEFTRIAXONE 100 MILLIGRAM(S): 500 INJECTION, POWDER, FOR SOLUTION INTRAMUSCULAR; INTRAVENOUS at 03:21

## 2023-08-14 RX ADMIN — Medication 1: at 17:51

## 2023-08-14 RX ADMIN — Medication 81 MILLIGRAM(S): at 12:35

## 2023-08-14 RX ADMIN — APIXABAN 2.5 MILLIGRAM(S): 2.5 TABLET, FILM COATED ORAL at 05:22

## 2023-08-14 RX ADMIN — APIXABAN 2.5 MILLIGRAM(S): 2.5 TABLET, FILM COATED ORAL at 17:52

## 2023-08-14 RX ADMIN — Medication 1 TABLET(S): at 22:49

## 2023-08-14 RX ADMIN — Medication 12.5 MILLIGRAM(S): at 17:52

## 2023-08-14 RX ADMIN — Medication 125 MICROGRAM(S): at 12:36

## 2023-08-14 RX ADMIN — BUMETANIDE 1 MILLIGRAM(S): 0.25 INJECTION INTRAMUSCULAR; INTRAVENOUS at 14:53

## 2023-08-14 RX ADMIN — TAMSULOSIN HYDROCHLORIDE 0.8 MILLIGRAM(S): 0.4 CAPSULE ORAL at 22:09

## 2023-08-14 NOTE — PATIENT PROFILE ADULT - FALL HARM RISK - HARM RISK INTERVENTIONS

## 2023-08-14 NOTE — PROGRESS NOTE ADULT - SUBJECTIVE AND OBJECTIVE BOX
Cardiology Progress Note  ------------------------------------------------------------------------------------------  SUBJECTIVE:   No events overnight. Denies CP, SOB or Palpitations.   -------------------------------------------------------------------------------------------  ROS:  CV: chest pain (-), palpitation (-), orthopnea (-), PND (-), edema (-)  PULM: SOB (-), cough (-), wheezing (-), hemoptysis (-).   CONST: fever (-), chills (-) or fatigue (-)  GI: abdominal distension (-), abdominal pain (-) , nausea/vomiting (-), hematemesis, (-), melena (-), hematochezia (-)  : dysuria (-), frequency (-), hematuria (-).   NEURO: numbness (-), weakness (-), dizziness (-)  MSK: myalgia (-), joint pain (-)   SKIN: itching (-), rash (-)  HEENT:  visual changes (-); vertigo or throat pain (-);  neck stiffness (-)   Psych: change in mood (-), anxiety (-), depression (-)     All other review of systems is negative unless indicated above.   -------------------------------------------------------------------------------------------  VS:  T(F): 98.3 (08-14), Max: 98.3 (08-14)  HR: 85 (08-14) (79 - 89)  BP: 95/55 (08-14) (95/55 - 103/63)  RR: 17 (08-14)  SpO2: 98% (08-14)  I&O's Summary    13 Aug 2023 07:01  -  14 Aug 2023 07:00  --------------------------------------------------------  IN: 0 mL / OUT: 850 mL / NET: -850 mL      ------------------------------------------------------------------------------------------  PHYSICAL EXAM:  General: No acute distress. Awake and conversant.   Eyes: Normal conjunctiva, anicteric. Round symmetric pupils.   ENT: Hearing grossly intact. No nasal discharge.   Neck: Neck is supple. No masses or thyromegaly.   Respiratory: Respirations are non-labored. No wheezing.   Skin: Warm. No rashes or ulcers.   Psych: Alert and oriented. Cooperative, Appropriate mood and affect, Normal judgment.   CV: No lower extremity edema.   MSK: Normal ambulation. No clubbing or cyanosis.   Neuro: Sensation and CN II-XII grossly normal.  -------------------------------------------------------------------------------------------  LABS:                          10.3   10.80 )-----------( 175      ( 13 Aug 2023 04:13 )             33.6     08-13    141  |  98  |  71<H>  ----------------------------<  152<H>  4.1   |  29  |  2.42<H>    Ca    10.0      13 Aug 2023 04:13  Phos  5.3     08-13  Mg     2.30     08-13    TPro  9.3<H>  /  Alb  3.1<L>  /  TBili  0.6  /  DBili  x   /  AST  16  /  ALT  14  /  AlkPhos  98  08-13    PT/INR - ( 13 Aug 2023 04:13 )   PT: 15.3 sec;   INR: 1.37 ratio         PTT - ( 13 Aug 2023 04:13 )  PTT:44.4 sec  CARDIAC MARKERS ( 13 Aug 2023 06:25 )  44 ng/L / x     / x     / x     / x     / x      CARDIAC MARKERS ( 13 Aug 2023 04:13 )  46 ng/L / x     / x     / x     / x     / x                -------------------------------------------------------------------------------------------  Meds:  acetaminophen     Tablet .. 650 milliGRAM(s) Oral every 6 hours PRN  apixaban 2.5 milliGRAM(s) Oral two times a day  aspirin  chewable 81 milliGRAM(s) Oral daily  buMETAnide Injectable 1 milliGRAM(s) IV Push every 12 hours  cefTRIAXone   IVPB 1000 milliGRAM(s) IV Intermittent every 24 hours  dextrose 5%. 1000 milliLiter(s) IV Continuous <Continuous>  dextrose 5%. 1000 milliLiter(s) IV Continuous <Continuous>  dextrose 50% Injectable 25 Gram(s) IV Push once  dextrose 50% Injectable 12.5 Gram(s) IV Push once  dextrose 50% Injectable 25 Gram(s) IV Push once  dextrose Oral Gel 15 Gram(s) Oral once PRN  digoxin     Tablet 125 MICROGram(s) Oral every other day  finasteride 5 milliGRAM(s) Oral daily  glucagon  Injectable 1 milliGRAM(s) IntraMuscular once  insulin lispro (ADMELOG) corrective regimen sliding scale   SubCutaneous at bedtime  insulin lispro (ADMELOG) corrective regimen sliding scale   SubCutaneous three times a day before meals  metoprolol tartrate 12.5 milliGRAM(s) Oral two times a day  tamsulosin 0.8 milliGRAM(s) Oral at bedtime    -------------------------------------------------------------------------------------------  Cardiovascular Diagnostic Testing:    ECG:     Echo:     Stress Testing:    Cath:    Imaging:    CXR:  reviewed  -------------------------------------------------------------------------------------------  Assessment and Plan:   1.  2.  3.  4.    Flash Issa MD  Fellow Physician   Cardiology Inpatient Consult Service     Please check [email or web address] password: "[email or web address]" for cardiology service schedule and contact information.  Cardiology Progress Note  ------------------------------------------------------------------------------------------  SUBJECTIVE:   No events overnight. Denies CP, SOB or Palpitations.   Tele: a fib with PVCs  -------------------------------------------------------------------------------------------  ROS:  CV: chest pain (-), palpitation (-), orthopnea (-), PND (-), edema (-)  PULM: SOB (-), cough (-), wheezing (-), hemoptysis (-).   CONST: fever (-), chills (-) or fatigue (-)  GI: abdominal distension (-), abdominal pain (-) , nausea/vomiting (-), hematemesis, (-), melena (-), hematochezia (-)  : dysuria (-), frequency (-), hematuria (-).   NEURO: numbness (-), weakness (-), dizziness (-)  MSK: myalgia (-), joint pain (-)   SKIN: itching (-), rash (-)  HEENT:  visual changes (-); vertigo or throat pain (-);  neck stiffness (-)   Psych: change in mood (-), anxiety (-), depression (-)     All other review of systems is negative unless indicated above.   -------------------------------------------------------------------------------------------  VS:  T(F): 98.3 (), Max: 98.3 ()  HR: 85 () (79 - 89)  BP: 95/55 (08-14) (95/55 - 103/63)  RR: 17 ()  SpO2: 98% ()  I&O's Summary    13 Aug 2023 07:01  -  14 Aug 2023 07:00  --------------------------------------------------------  IN: 0 mL / OUT: 850 mL / NET: -850 mL      ------------------------------------------------------------------------------------------  PHYSICAL EXAM:  General: No acute distress. Awake and conversant.   Eyes: Normal conjunctiva, anicteric. Round symmetric pupils.   ENT: Hearing grossly intact. No nasal discharge.   Neck: Neck is supple. No masses or thyromegaly.   Respiratory: Bibasilar crackles  Skin: Warm. No rashes or ulcers.   Psych: Alert and oriented. Cooperative, Appropriate mood and affect, Normal judgment.   CV: Irregularly irregular, No lower extremity edema.   MSK: Normal ambulation. No clubbing or cyanosis.   Neuro: Sensation and CN II-XII grossly normal.  -------------------------------------------------------------------------------------------  LABS:                          10.3   10.80 )-----------( 175      ( 13 Aug 2023 04:13 )             33.6     08    141  |  98  |  71<H>  ----------------------------<  152<H>  4.1   |  29  |  2.42<H>    Ca    10.0      13 Aug 2023 04:13  Phos  5.3       Mg     2.30     13    TPro  9.3<H>  /  Alb  3.1<L>  /  TBili  0.6  /  DBili  x   /  AST  16  /  ALT  14  /  AlkPhos  98  08-13    PT/INR - ( 13 Aug 2023 04:13 )   PT: 15.3 sec;   INR: 1.37 ratio         PTT - ( 13 Aug 2023 04:13 )  PTT:44.4 sec  CARDIAC MARKERS ( 13 Aug 2023 06:25 )  44 ng/L / x     / x     / x     / x     / x      CARDIAC MARKERS ( 13 Aug 2023 04:13 )  46 ng/L / x     / x     / x     / x     / x                -------------------------------------------------------------------------------------------  Meds:  acetaminophen     Tablet .. 650 milliGRAM(s) Oral every 6 hours PRN  apixaban 2.5 milliGRAM(s) Oral two times a day  aspirin  chewable 81 milliGRAM(s) Oral daily  buMETAnide Injectable 1 milliGRAM(s) IV Push every 12 hours  cefTRIAXone   IVPB 1000 milliGRAM(s) IV Intermittent every 24 hours  dextrose 5%. 1000 milliLiter(s) IV Continuous <Continuous>  dextrose 5%. 1000 milliLiter(s) IV Continuous <Continuous>  dextrose 50% Injectable 25 Gram(s) IV Push once  dextrose 50% Injectable 12.5 Gram(s) IV Push once  dextrose 50% Injectable 25 Gram(s) IV Push once  dextrose Oral Gel 15 Gram(s) Oral once PRN  digoxin     Tablet 125 MICROGram(s) Oral every other day  finasteride 5 milliGRAM(s) Oral daily  glucagon  Injectable 1 milliGRAM(s) IntraMuscular once  insulin lispro (ADMELOG) corrective regimen sliding scale   SubCutaneous at bedtime  insulin lispro (ADMELOG) corrective regimen sliding scale   SubCutaneous three times a day before meals  metoprolol tartrate 12.5 milliGRAM(s) Oral two times a day  tamsulosin 0.8 milliGRAM(s) Oral at bedtime    -------------------------------------------------------------------------------------------  Cardiovascular Diagnostic Testing:    ECG: a fib    Echo: 2023  OBSERVATIONS:  Mitral Valve: Mitral annular calcification, otherwise  normal mitral valve. Mild mitral regurgitation.  Aortic Root: Aortic root not well visualized.  Aortic Valve: Aortic valve not well visualized. Mild aortic  regurgitation.  Left Atrium: Normal left atrium.  LA volume index = 24  cc/m2.  Left Ventricle: Severe global left ventricular systolic  dysfunction.  Estimated LVEF in the 20-25% range (by visual  estimate).  Endocardial visualization enhanced with  intravenous injection of echo contrast (Definity).  No LV  thrombus seen.  Right Heart: Normal right atrium. Normal right ventricular  size and function. Normal tricuspid valve. Minimal  tricuspid regurgitation. Pulmonic valve not well  visualized.  Pericardium/PleuraNormal pericardium with no pericardial  effusion.  ------------------------------------------------------------------------  CONCLUSIONS:  1. Mitral annular calcification, otherwise normal mitral  valve. Mild mitral regurgitation.  2. Aortic valve not well visualized. Mild aortic  regurgitation.  3. Severe global left ventricular systolic dysfunction.  Estimated LVEF in the 20-25% range (by visual estimate).  Endocardial visualization enhanced with intravenous  injection of echo contrast (Definity).  No LV thrombus  seen.  4. Normal right ventricular size and function.  *** No previous Echo exam.      Stress Testin2023  IMPRESSIONS:Abnormal Study  * Myocardial Perfusion SPECT results are abnormal.  * Review of raw data shows: The study is of good technical  quality.  * The leftventricle was markdely dilated at baseline.  There are large, severe defects in anterior and  anteroseptal, apical walls that are fixed, suggestive of  infarct.There is a medium sized, severe defect in proximal  to mid septal wall that is fixed, suggestive of infarct.  The best perfusion was in the lateral / inferolateral  wall. There was increased RV tracer uptake.  * Post-stress gated wall motion analysis was performed  (LVEF = 22 %;LVEDV = 190 ml.), revealing severe overall  hypokinesis. Therewas apical, anterior and anteroseptal  akinesis. There was proximal to mid septal akinesis. The  best motion was in the lateral and inferolateral walls. RV  size and function appeared normal.  ------------------------------------------------------------------------  Confirmed on  2023 - 18:22:21 by Gerardo Weldon M.D.  ------------------------------------------------------------------------      Cath:    Imaging:    CXR:  reviewed  -------------------------------------------------------------------------------------------  Assessment and Plan: 83M with pmhx of ICM, HFrEF (LVEF 20-25%), refused defibrillator, Afib on Eliquis, T2DM, CKD3, HTN, HLD, BPH, ?COPD presents with SOB with concern for ADHF.  1. HFrEF  - Continue Bumex 1mg IV BID  - F/u repeat TTE  2. A fib  - c/w home dig, eliquis toprol, please check Dig levels daily as pt with ZACHARY  3.  4.    Flash Issa MD  Fellow Physician   Cardiology Inpatient Consult Service     Please check [email or web address] password: [email or web address]" for cardiology service schedule and contact information.  Cardiology Progress Note  ------------------------------------------------------------------------------------------  SUBJECTIVE:   No events overnight. Denies CP, SOB or Palpitations.   Tele: a fib with PVCs  -------------------------------------------------------------------------------------------  ROS:  CV: chest pain (-), palpitation (-), orthopnea (-), PND (-), edema (-)  PULM: SOB (-), cough (-), wheezing (-), hemoptysis (-).   CONST: fever (-), chills (-) or fatigue (-)  GI: abdominal distension (-), abdominal pain (-) , nausea/vomiting (-), hematemesis, (-), melena (-), hematochezia (-)  : dysuria (-), frequency (-), hematuria (-).   NEURO: numbness (-), weakness (-), dizziness (-)  MSK: myalgia (-), joint pain (-)   SKIN: itching (-), rash (-)  HEENT:  visual changes (-); vertigo or throat pain (-);  neck stiffness (-)   Psych: change in mood (-), anxiety (-), depression (-)     All other review of systems is negative unless indicated above.   -------------------------------------------------------------------------------------------  VS:  T(F): 98.3 (), Max: 98.3 ()  HR: 85 () (79 - 89)  BP: 95/55 (08-14) (95/55 - 103/63)  RR: 17 ()  SpO2: 98% ()  I&O's Summary    13 Aug 2023 07:01  -  14 Aug 2023 07:00  --------------------------------------------------------  IN: 0 mL / OUT: 850 mL / NET: -850 mL      ------------------------------------------------------------------------------------------  PHYSICAL EXAM:  General: No acute distress. Awake and conversant.   Eyes: Normal conjunctiva, anicteric. Round symmetric pupils.   ENT: Hearing grossly intact. No nasal discharge.   Neck: Neck is supple. No masses or thyromegaly.   Respiratory: Bibasilar crackles  Skin: Warm. No rashes or ulcers.   Psych: Alert and oriented. Cooperative, Appropriate mood and affect, Normal judgment.   CV: Irregularly irregular, No lower extremity edema.   MSK: Normal ambulation. No clubbing or cyanosis.   Neuro: Sensation and CN II-XII grossly normal.  -------------------------------------------------------------------------------------------  LABS:                          10.3   10.80 )-----------( 175      ( 13 Aug 2023 04:13 )             33.6     08    141  |  98  |  71<H>  ----------------------------<  152<H>  4.1   |  29  |  2.42<H>    Ca    10.0      13 Aug 2023 04:13  Phos  5.3       Mg     2.30     13    TPro  9.3<H>  /  Alb  3.1<L>  /  TBili  0.6  /  DBili  x   /  AST  16  /  ALT  14  /  AlkPhos  98  08-13    PT/INR - ( 13 Aug 2023 04:13 )   PT: 15.3 sec;   INR: 1.37 ratio         PTT - ( 13 Aug 2023 04:13 )  PTT:44.4 sec  CARDIAC MARKERS ( 13 Aug 2023 06:25 )  44 ng/L / x     / x     / x     / x     / x      CARDIAC MARKERS ( 13 Aug 2023 04:13 )  46 ng/L / x     / x     / x     / x     / x                -------------------------------------------------------------------------------------------  Meds:  acetaminophen     Tablet .. 650 milliGRAM(s) Oral every 6 hours PRN  apixaban 2.5 milliGRAM(s) Oral two times a day  aspirin  chewable 81 milliGRAM(s) Oral daily  buMETAnide Injectable 1 milliGRAM(s) IV Push every 12 hours  cefTRIAXone   IVPB 1000 milliGRAM(s) IV Intermittent every 24 hours  dextrose 5%. 1000 milliLiter(s) IV Continuous <Continuous>  dextrose 5%. 1000 milliLiter(s) IV Continuous <Continuous>  dextrose 50% Injectable 25 Gram(s) IV Push once  dextrose 50% Injectable 12.5 Gram(s) IV Push once  dextrose 50% Injectable 25 Gram(s) IV Push once  dextrose Oral Gel 15 Gram(s) Oral once PRN  digoxin     Tablet 125 MICROGram(s) Oral every other day  finasteride 5 milliGRAM(s) Oral daily  glucagon  Injectable 1 milliGRAM(s) IntraMuscular once  insulin lispro (ADMELOG) corrective regimen sliding scale   SubCutaneous at bedtime  insulin lispro (ADMELOG) corrective regimen sliding scale   SubCutaneous three times a day before meals  metoprolol tartrate 12.5 milliGRAM(s) Oral two times a day  tamsulosin 0.8 milliGRAM(s) Oral at bedtime    -------------------------------------------------------------------------------------------  Cardiovascular Diagnostic Testing:    ECG: a fib    Echo: 2023  OBSERVATIONS:  Mitral Valve: Mitral annular calcification, otherwise  normal mitral valve. Mild mitral regurgitation.  Aortic Root: Aortic root not well visualized.  Aortic Valve: Aortic valve not well visualized. Mild aortic  regurgitation.  Left Atrium: Normal left atrium.  LA volume index = 24  cc/m2.  Left Ventricle: Severe global left ventricular systolic  dysfunction.  Estimated LVEF in the 20-25% range (by visual  estimate).  Endocardial visualization enhanced with  intravenous injection of echo contrast (Definity).  No LV  thrombus seen.  Right Heart: Normal right atrium. Normal right ventricular  size and function. Normal tricuspid valve. Minimal  tricuspid regurgitation. Pulmonic valve not well  visualized.  Pericardium/PleuraNormal pericardium with no pericardial  effusion.  ------------------------------------------------------------------------  CONCLUSIONS:  1. Mitral annular calcification, otherwise normal mitral  valve. Mild mitral regurgitation.  2. Aortic valve not well visualized. Mild aortic  regurgitation.  3. Severe global left ventricular systolic dysfunction.  Estimated LVEF in the 20-25% range (by visual estimate).  Endocardial visualization enhanced with intravenous  injection of echo contrast (Definity).  No LV thrombus  seen.  4. Normal right ventricular size and function.  *** No previous Echo exam.      Stress Testin2023  IMPRESSIONS:Abnormal Study  * Myocardial Perfusion SPECT results are abnormal.  * Review of raw data shows: The study is of good technical  quality.  * The leftventricle was markdely dilated at baseline.  There are large, severe defects in anterior and  anteroseptal, apical walls that are fixed, suggestive of  infarct.There is a medium sized, severe defect in proximal  to mid septal wall that is fixed, suggestive of infarct.  The best perfusion was in the lateral / inferolateral  wall. There was increased RV tracer uptake.  * Post-stress gated wall motion analysis was performed  (LVEF = 22 %;LVEDV = 190 ml.), revealing severe overall  hypokinesis. Therewas apical, anterior and anteroseptal  akinesis. There was proximal to mid septal akinesis. The  best motion was in the lateral and inferolateral walls. RV  size and function appeared normal.  ------------------------------------------------------------------------  Confirmed on  2023 - 18:22:21 by Gerardo Weldon M.D.  ------------------------------------------------------------------------      Cath:    Imaging:    CXR:  reviewed  -------------------------------------------------------------------------------------------  Assessment and Plan: 83M with pmhx of ICM, HFrEF (LVEF 20-25%), refused defibrillator, Afib on Eliquis, T2DM, CKD3, HTN, HLD, BPH, ?COPD presents with SOB with concern for ADHF.  1. HFrEF  - Continue Bumex 1mg IV BID  - F/u repeat TTE  2. A fib  - c/w home dig, eliquis toprol, please check Dig levels daily as pt with ZACHARY  3.HTN: stable  4.HLD: on statin    Flash Issa MD  Fellow Physician   Cardiology Inpatient Consult Service     Please check [email or web address] password: [email or web address]" for cardiology service schedule and contact information.

## 2023-08-14 NOTE — PROGRESS NOTE ADULT - SUBJECTIVE AND OBJECTIVE BOX
[facility] Division of Hospital Medicine  Wilton Waldron MD  Pager (M-F, 8A-5P): [phone]  Other Times:  [phone]    Patient is a 83y old  Male who presents with a chief complaint of Dyspnea, urinary retention (14 Aug 2023 11:51)      SUBJECTIVE / OVERNIGHT EVENTS: tele Afib with PVC. daughter at bedside served as . still feels SOB although states improved from admission       MEDICATIONS  (STANDING):  apixaban 2.5 milliGRAM(s) Oral two times a day  aspirin  chewable 81 milliGRAM(s) Oral daily  buMETAnide Injectable 1 milliGRAM(s) IV Push every 12 hours  cefTRIAXone   IVPB 1000 milliGRAM(s) IV Intermittent every 24 hours  dextrose 5%. 1000 milliLiter(s) (100 mL/Hr) IV Continuous <Continuous>  dextrose 5%. 1000 milliLiter(s) (50 mL/Hr) IV Continuous <Continuous>  dextrose 50% Injectable 12.5 Gram(s) IV Push once  dextrose 50% Injectable 25 Gram(s) IV Push once  dextrose 50% Injectable 25 Gram(s) IV Push once  digoxin     Tablet 125 MICROGram(s) Oral every other day  finasteride 5 milliGRAM(s) Oral daily  glucagon  Injectable 1 milliGRAM(s) IntraMuscular once  insulin lispro (ADMELOG) corrective regimen sliding scale   SubCutaneous three times a day before meals  insulin lispro (ADMELOG) corrective regimen sliding scale   SubCutaneous at bedtime  metoprolol tartrate 12.5 milliGRAM(s) Oral two times a day  tamsulosin 0.8 milliGRAM(s) Oral at bedtime    MEDICATIONS  (PRN):  acetaminophen     Tablet .. 650 milliGRAM(s) Oral every 6 hours PRN Temp greater or equal to 38C (100.4F), Mild Pain (1 - 3)  dextrose Oral Gel 15 Gram(s) Oral once PRN Blood Glucose LESS THAN 70 milliGRAM(s)/deciliter      CAPILLARY BLOOD GLUCOSE      POCT Blood Glucose.: 339 mg/dL (14 Aug 2023 12:32)  POCT Blood Glucose.: 166 mg/dL (13 Aug 2023 22:07)    I&O's Summary    13 Aug 2023 07:01  -  14 Aug 2023 07:00  --------------------------------------------------------  IN: 0 mL / OUT: 850 mL / NET: -850 mL    14 Aug 2023 07:01  -  14 Aug 2023 16:59  --------------------------------------------------------  IN: 0 mL / OUT: 600 mL / NET: -600 mL        PHYSICAL EXAM:  Vital Signs Last 24 Hrs  T(C): 36.6 (14 Aug 2023 14:50), Max: 36.8 (14 Aug 2023 02:54)  T(F): 97.8 (14 Aug 2023 14:50), Max: 98.3 (14 Aug 2023 05:20)  HR: 84 (14 Aug 2023 14:50) (79 - 90)  BP: 107/64 (14 Aug 2023 14:50) (95/55 - 109/60)  BP(mean): --  RR: 17 (14 Aug 2023 14:50) (16 - 20)  SpO2: 98% (14 Aug 2023 14:50) (98% - 100%)    Parameters below as of 14 Aug 2023 14:50  Patient On (Oxygen Delivery Method): nasal cannula  O2 Flow (L/min): 2    CONSTITUTIONAL: cachetic   EYES:  conjunctiva and sclera clear  RESPIRATORY: b/l transmitted BS   CARDIOVASCULAR: s1/s2   ABDOMEN: Nontender to palpation, normoactive bowel sounds    LABS:                        10.3   10.80 )-----------( 175      ( 13 Aug 2023 04:13 )             33.6     08-13    141  |  98  |  71<H>  ----------------------------<  152<H>  4.1   |  29  |  2.42<H>    Ca    10.0      13 Aug 2023 04:13  Phos  5.3     08-13  Mg     2.30     08-13    TPro  9.3<H>  /  Alb  3.1<L>  /  TBili  0.6  /  DBili  x   /  AST  16  /  ALT  14  /  AlkPhos  98  08-13    PT/INR - ( 13 Aug 2023 04:13 )   PT: 15.3 sec;   INR: 1.37 ratio         PTT - ( 13 Aug 2023 04:13 )  PTT:44.4 sec      Urinalysis Basic - ( 13 Aug 2023 04:25 )    Color: Yellow / Appearance: Cloudy / S.012 / pH: x  Gluc: x / Ketone: Negative mg/dL  / Bili: Negative / Urobili: 0.2 mg/dL   Blood: x / Protein: 30 mg/dL / Nitrite: Negative   Leuk Esterase: Large / RBC: 38 /HPF /  /HPF   Sq Epi: x / Non Sq Epi: 0 /HPF / Bacteria: Negative /HPF        Culture - Urine (collected 13 Aug 2023 04:25)  Source: Catheterized Catheterized  Final Report (14 Aug 2023 12:27):    <10,000 CFU/mL Normal Urogenital Oriana    Culture - Blood (collected 13 Aug 2023 04:23)  Source: .Blood Blood-Peripheral  Preliminary Report (14 Aug 2023 11:01):    No growth at 24 hours    Culture - Blood (collected 13 Aug 2023 04:13)  Source: .Blood Blood-Peripheral  Preliminary Report (14 Aug 2023 11:01):    No growth at 24 hours        RADIOLOGY & ADDITIONAL TESTS:  Results Reviewed:   Imaging Personally Reviewed:  Electrocardiogram Personally Reviewed:    COORDINATION OF CARE:  Care Discussed with Consultants/Other Providers [Y/N]:  Prior or Outpatient Records Reviewed [Y/N]:

## 2023-08-15 DIAGNOSIS — N18.9 CHRONIC KIDNEY DISEASE, UNSPECIFIED: ICD-10-CM

## 2023-08-15 DIAGNOSIS — N17.9 ACUTE KIDNEY FAILURE, UNSPECIFIED: ICD-10-CM

## 2023-08-15 LAB
ANION GAP SERPL CALC-SCNC: 15 MMOL/L — HIGH (ref 7–14)
BASOPHILS # BLD AUTO: 0.04 K/UL — SIGNIFICANT CHANGE UP (ref 0–0.2)
BASOPHILS NFR BLD AUTO: 0.5 % — SIGNIFICANT CHANGE UP (ref 0–2)
BUN SERPL-MCNC: 84 MG/DL — HIGH (ref 7–23)
CALCIUM SERPL-MCNC: 10.2 MG/DL — SIGNIFICANT CHANGE UP (ref 8.4–10.5)
CHLORIDE SERPL-SCNC: 98 MMOL/L — SIGNIFICANT CHANGE UP (ref 98–107)
CO2 SERPL-SCNC: 27 MMOL/L — SIGNIFICANT CHANGE UP (ref 22–31)
CREAT SERPL-MCNC: 2.1 MG/DL — HIGH (ref 0.5–1.3)
DIGOXIN SERPL-MCNC: 0.7 NG/ML — LOW (ref 0.8–2)
EGFR: 30 ML/MIN/1.73M2 — LOW
EOSINOPHIL # BLD AUTO: 0.25 K/UL — SIGNIFICANT CHANGE UP (ref 0–0.5)
EOSINOPHIL NFR BLD AUTO: 3 % — SIGNIFICANT CHANGE UP (ref 0–6)
GLUCOSE SERPL-MCNC: 191 MG/DL — HIGH (ref 70–99)
HCT VFR BLD CALC: 33.6 % — LOW (ref 39–50)
HGB BLD-MCNC: 10.2 G/DL — LOW (ref 13–17)
IANC: 5.27 K/UL — SIGNIFICANT CHANGE UP (ref 1.8–7.4)
IMM GRANULOCYTES NFR BLD AUTO: 0.4 % — SIGNIFICANT CHANGE UP (ref 0–0.9)
LYMPHOCYTES # BLD AUTO: 1.82 K/UL — SIGNIFICANT CHANGE UP (ref 1–3.3)
LYMPHOCYTES # BLD AUTO: 21.6 % — SIGNIFICANT CHANGE UP (ref 13–44)
MAGNESIUM SERPL-MCNC: 2.4 MG/DL — SIGNIFICANT CHANGE UP (ref 1.6–2.6)
MCHC RBC-ENTMCNC: 27.6 PG — SIGNIFICANT CHANGE UP (ref 27–34)
MCHC RBC-ENTMCNC: 30.4 GM/DL — LOW (ref 32–36)
MCV RBC AUTO: 91.1 FL — SIGNIFICANT CHANGE UP (ref 80–100)
MONOCYTES # BLD AUTO: 1.01 K/UL — HIGH (ref 0–0.9)
MONOCYTES NFR BLD AUTO: 12 % — SIGNIFICANT CHANGE UP (ref 2–14)
NEUTROPHILS # BLD AUTO: 5.27 K/UL — SIGNIFICANT CHANGE UP (ref 1.8–7.4)
NEUTROPHILS NFR BLD AUTO: 62.5 % — SIGNIFICANT CHANGE UP (ref 43–77)
NRBC # BLD: 0 /100 WBCS — SIGNIFICANT CHANGE UP (ref 0–0)
NRBC # FLD: 0 K/UL — SIGNIFICANT CHANGE UP (ref 0–0)
PHOSPHATE SERPL-MCNC: 3.8 MG/DL — SIGNIFICANT CHANGE UP (ref 2.5–4.5)
PLATELET # BLD AUTO: 162 K/UL — SIGNIFICANT CHANGE UP (ref 150–400)
POTASSIUM SERPL-MCNC: 3.9 MMOL/L — SIGNIFICANT CHANGE UP (ref 3.5–5.3)
POTASSIUM SERPL-SCNC: 3.9 MMOL/L — SIGNIFICANT CHANGE UP (ref 3.5–5.3)
RBC # BLD: 3.69 M/UL — LOW (ref 4.2–5.8)
RBC # FLD: 13.9 % — SIGNIFICANT CHANGE UP (ref 10.3–14.5)
SODIUM SERPL-SCNC: 140 MMOL/L — SIGNIFICANT CHANGE UP (ref 135–145)
WBC # BLD: 8.42 K/UL — SIGNIFICANT CHANGE UP (ref 3.8–10.5)
WBC # FLD AUTO: 8.42 K/UL — SIGNIFICANT CHANGE UP (ref 3.8–10.5)

## 2023-08-15 PROCEDURE — 99233 SBSQ HOSP IP/OBS HIGH 50: CPT

## 2023-08-15 RX ORDER — METOPROLOL TARTRATE 50 MG
25 TABLET ORAL DAILY
Refills: 0 | Status: DISCONTINUED | OUTPATIENT
Start: 2023-08-16 | End: 2023-08-17

## 2023-08-15 RX ORDER — INSULIN GLARGINE 100 [IU]/ML
5 INJECTION, SOLUTION SUBCUTANEOUS AT BEDTIME
Refills: 0 | Status: DISCONTINUED | OUTPATIENT
Start: 2023-08-15 | End: 2023-08-18

## 2023-08-15 RX ADMIN — Medication 81 MILLIGRAM(S): at 12:54

## 2023-08-15 RX ADMIN — FINASTERIDE 5 MILLIGRAM(S): 5 TABLET, FILM COATED ORAL at 12:54

## 2023-08-15 RX ADMIN — Medication 12.5 MILLIGRAM(S): at 17:44

## 2023-08-15 RX ADMIN — APIXABAN 2.5 MILLIGRAM(S): 2.5 TABLET, FILM COATED ORAL at 17:44

## 2023-08-15 RX ADMIN — BUMETANIDE 1 MILLIGRAM(S): 0.25 INJECTION INTRAMUSCULAR; INTRAVENOUS at 09:29

## 2023-08-15 RX ADMIN — APIXABAN 2.5 MILLIGRAM(S): 2.5 TABLET, FILM COATED ORAL at 05:31

## 2023-08-15 RX ADMIN — Medication 2: at 09:24

## 2023-08-15 RX ADMIN — Medication 2: at 17:48

## 2023-08-15 RX ADMIN — INSULIN GLARGINE 5 UNIT(S): 100 INJECTION, SOLUTION SUBCUTANEOUS at 22:04

## 2023-08-15 RX ADMIN — CEFTRIAXONE 100 MILLIGRAM(S): 500 INJECTION, POWDER, FOR SOLUTION INTRAMUSCULAR; INTRAVENOUS at 02:22

## 2023-08-15 RX ADMIN — Medication 12.5 MILLIGRAM(S): at 05:33

## 2023-08-15 RX ADMIN — TAMSULOSIN HYDROCHLORIDE 0.8 MILLIGRAM(S): 0.4 CAPSULE ORAL at 21:55

## 2023-08-15 RX ADMIN — Medication 3: at 12:54

## 2023-08-15 NOTE — PROGRESS NOTE ADULT - SUBJECTIVE AND OBJECTIVE BOX
[facility] Division of Hospital Medicine  Wilton Waldorn MD  Pager (M-F, 8A-5P): [phone]  Other Times:  [phone]    Patient is a 83y old  Male who presents with a chief complaint of Dyspnea, urinary retention (15 Aug 2023 08:26)      SUBJECTIVE / OVERNIGHT EVENTS: Pt in bed still frail; afebrile; FS mildly elevated this AM. tele Afib rates controlled       MEDICATIONS  (STANDING):  apixaban 2.5 milliGRAM(s) Oral two times a day  aspirin  chewable 81 milliGRAM(s) Oral daily  buMETAnide Injectable 1 milliGRAM(s) IV Push every 12 hours  cefTRIAXone   IVPB 1000 milliGRAM(s) IV Intermittent every 24 hours  dextrose 5%. 1000 milliLiter(s) (100 mL/Hr) IV Continuous <Continuous>  dextrose 5%. 1000 milliLiter(s) (50 mL/Hr) IV Continuous <Continuous>  dextrose 50% Injectable 25 Gram(s) IV Push once  dextrose 50% Injectable 12.5 Gram(s) IV Push once  dextrose 50% Injectable 25 Gram(s) IV Push once  digoxin     Tablet 125 MICROGram(s) Oral every other day  finasteride 5 milliGRAM(s) Oral daily  glucagon  Injectable 1 milliGRAM(s) IntraMuscular once  insulin glargine Injectable (LANTUS) 5 Unit(s) SubCutaneous at bedtime  insulin lispro (ADMELOG) corrective regimen sliding scale   SubCutaneous three times a day before meals  insulin lispro (ADMELOG) corrective regimen sliding scale   SubCutaneous at bedtime  metoprolol tartrate 12.5 milliGRAM(s) Oral two times a day  tamsulosin 0.8 milliGRAM(s) Oral at bedtime    MEDICATIONS  (PRN):  acetaminophen     Tablet .. 650 milliGRAM(s) Oral every 6 hours PRN Temp greater or equal to 38C (100.4F), Mild Pain (1 - 3)  dextrose Oral Gel 15 Gram(s) Oral once PRN Blood Glucose LESS THAN 70 milliGRAM(s)/deciliter      CAPILLARY BLOOD GLUCOSE      POCT Blood Glucose.: 253 mg/dL (15 Aug 2023 12:38)  POCT Blood Glucose.: 208 mg/dL (15 Aug 2023 08:33)  POCT Blood Glucose.: 191 mg/dL (14 Aug 2023 22:09)  POCT Blood Glucose.: 191 mg/dL (14 Aug 2023 17:26)    I&O's Summary    14 Aug 2023 07:01  -  15 Aug 2023 07:00  --------------------------------------------------------  IN: 0 mL / OUT: 600 mL / NET: -600 mL        PHYSICAL EXAM:  Vital Signs Last 24 Hrs  T(C): 36.8 (15 Aug 2023 05:30), Max: 36.8 (15 Aug 2023 05:30)  T(F): 98.2 (15 Aug 2023 05:30), Max: 98.2 (15 Aug 2023 05:30)  HR: 87 (15 Aug 2023 05:30) (78 - 87)  BP: 105/57 (15 Aug 2023 05:30) (100/52 - 107/64)  BP(mean): --  RR: 17 (15 Aug 2023 05:30) (17 - 18)  SpO2: 96% (15 Aug 2023 05:30) (96% - 98%)    Parameters below as of 15 Aug 2023 05:30  Patient On (Oxygen Delivery Method): nasal cannula  O2 Flow (L/min): 2    CONSTITUTIONAL: cachetic   EYES:  conjunctiva and sclera clear  RESPIRATORY: + Rt base crackles    CARDIOVASCULAR: s1/s2   ABDOMEN: Nontender to palpation, normoactive bowel sounds    LABS:                        10.2   8.42  )-----------( 162      ( 15 Aug 2023 05:27 )             33.6     08-15    140  |  98  |  84<H>  ----------------------------<  191<H>  3.9   |  27  |  2.10<H>    Ca    10.2      15 Aug 2023 05:27  Phos  3.8     08-15  Mg     2.40     08-15            Urinalysis Basic - ( 15 Aug 2023 05:27 )    Color: x / Appearance: x / SG: x / pH: x  Gluc: 191 mg/dL / Ketone: x  / Bili: x / Urobili: x   Blood: x / Protein: x / Nitrite: x   Leuk Esterase: x / RBC: x / WBC x   Sq Epi: x / Non Sq Epi: x / Bacteria: x        Culture - Urine (collected 13 Aug 2023 04:25)  Source: Catheterized Catheterized  Final Report (14 Aug 2023 12:27):    <10,000 CFU/mL Normal Urogenital Oriana    Culture - Blood (collected 13 Aug 2023 04:23)  Source: .Blood Blood-Peripheral  Preliminary Report (15 Aug 2023 10:01):    No growth at 48 Hours    Culture - Blood (collected 13 Aug 2023 04:13)  Source: .Blood Blood-Peripheral  Preliminary Report (15 Aug 2023 10:01):    No growth at 48 Hours        RADIOLOGY & ADDITIONAL TESTS:  Results Reviewed: < from: TTE with Doppler (w/Cont) (08.14.23 @ 12:44) >  ------------------------------------------------------------------------  OBSERVATIONS:  Mitral Valve: Mitral annular calcification and calcified  mitral leaflets with normal diastolic opening. Mild mitral  regurgitation.  Aortic Root: Normal aortic root.  Aortic Valve: Calcified trileaflet aortic valve with normal  opening. Minimal aortic regurgitation.  Left Atrium: Normal left atrium.  Left Ventricle: Endocardial visualization enhanced with  intravenous injection of echo contrast (Definity). Severe  global left ventricular systolic dysfunction. Normal left  ventricular internal dimensions and wall thicknesses.  (DT:132 ms).  Right Heart: Normal right atrium. Normal right ventricular  size and function. Normal tricuspid valve. Normal pulmonic  valve.  Pericardium/PleuraNormal pericardium with no pericardial  effusion.  ------------------------------------------------------------------------  CONCLUSIONS:  1. Mitral annular calcification and calcified mitral  leaflets with normal diastolic opening. Mild mitral  regurgitation.  2. Normal trileaflet aortic valve. Minimal aortic  regurgitation.  3. Endocardial visualization enhanced with intravenous  injection of echo contrast (Definity). Severe global left  ventricular systolic dysfunction.  4. Normal right ventricular size and function.    < end of copied text >    Imaging Personally Reviewed:  Electrocardiogram Personally Reviewed:    COORDINATION OF CARE:  Care Discussed with Consultants/Other Providers [Y/N]:  Prior or Outpatient Records Reviewed [Y/N]:

## 2023-08-15 NOTE — DIETITIAN INITIAL EVALUATION ADULT - OTHER INFO
Patient is a 83y old  Male who presents with a chief complaint of Dyspnea, urinary retention, per chart.     As per RN flow sheet, patient is consuming 26-50% of meals. Daughter reports patient could tolerate current diet consistency. Patient has no upper dentures. As per chart review, patient was seen by speech during previous stay, see note dated 2/15/2023, recommended puree with mildly thick liquid diet consistency. Recommend a SLP eval to determine diet consistency. Patient denies any nausea, vomiting, diarrhea during visit. C/o constipation, last bowel movement 3 days ago. Current weight: 54.7kg/120.5lbs (8/15, per RN flow sheet). As per Kiera HORNER, weight history: 59.2kg (2/13). Noted patient has weight loss of -4.5kg/-7.6%BW x 6months. Patient is amendable to oral nutritional supplement. Nutrient dense, protein rich foods, small frequent meals discussed and encouraged during visit.

## 2023-08-15 NOTE — DIETITIAN INITIAL EVALUATION ADULT - NS FNS DIET ORDER
Diet, Consistent Carbohydrate w/Evening Snack:   DASH/TLC {Sodium & Cholesterol Restricted} (DASH)  1500mL Fluid Restriction ([id number])  Moderately Thick Liquids (MODTHICKLIQS)  Low Sodium  No Caffeine (08-14-23 @ 13:04)

## 2023-08-15 NOTE — PROGRESS NOTE ADULT - SUBJECTIVE AND OBJECTIVE BOX
Cardiology Progress Note  ------------------------------------------------------------------------------------------  SUBJECTIVE:   No events overnight. Denies CP, SOB or Palpitations.   Tele: a fib, rate controlled, occasional PVCs  -------------------------------------------------------------------------------------------  ROS:  CV: chest pain (-), palpitation (-), orthopnea (-), PND (-), edema (-)  PULM: SOB (-), cough (-), wheezing (-), hemoptysis (-).   CONST: fever (-), chills (-) or fatigue (-)  GI: abdominal distension (-), abdominal pain (-) , nausea/vomiting (-), hematemesis, (-), melena (-), hematochezia (-)  : dysuria (-), frequency (-), hematuria (-).   NEURO: numbness (-), weakness (-), dizziness (-)  MSK: myalgia (-), joint pain (-)   SKIN: itching (-), rash (-)  HEENT:  visual changes (-); vertigo or throat pain (-);  neck stiffness (-)   Psych: change in mood (-), anxiety (-), depression (-)     All other review of systems is negative unless indicated above.   -------------------------------------------------------------------------------------------  VS:  T(F): 98.2 (08-15), Max: 98.2 (08-15)  HR: 87 (15) (78 - 90)  BP: 105/57 (08-15) (100/52 - 109/60)  RR: 17 (08-15)  SpO2: 96% (08-15)  I&O's Summary    14 Aug 2023 07:01  -  15 Aug 2023 07:00  --------------------------------------------------------  IN: 0 mL / OUT: 600 mL / NET: -600 mL      ------------------------------------------------------------------------------------------  PHYSICAL EXAM:  General: No acute distress. Awake and conversant.   Eyes: Normal conjunctiva, anicteric. Round symmetric pupils.   ENT: Hearing grossly intact. No nasal discharge.   Neck: Neck is supple. No masses or thyromegaly.   Respiratory: Respirations are non-labored. No wheezing.   Skin: Warm. No rashes or ulcers.   Psych: Alert and oriented. Cooperative, Appropriate mood and affect, Normal judgment.   CV: Irregularly irregular. No lower extremity edema.   MSK: Normal ambulation. No clubbing or cyanosis.   Neuro: Sensation and CN II-XII grossly normal.  -------------------------------------------------------------------------------------------  LABS:                          10.2   8.42  )-----------( 162      ( 15 Aug 2023 05:27 )             33.6     08-15    140  |  98  |  84<H>  ----------------------------<  191<H>  3.9   |  27  |  2.10<H>    Ca    10.2      15 Aug 2023 05:27  Phos  3.8     08-15  Mg     2.40     08-15        CARDIAC MARKERS ( 13 Aug 2023 06:25 )  44 ng/L / x     / x     / x     / x     / x      CARDIAC MARKERS ( 13 Aug 2023 04:13 )  46 ng/L / x     / x     / x     / x     / x                -------------------------------------------------------------------------------------------  Meds:  acetaminophen     Tablet .. 650 milliGRAM(s) Oral every 6 hours PRN  apixaban 2.5 milliGRAM(s) Oral two times a day  aspirin  chewable 81 milliGRAM(s) Oral daily  buMETAnide Injectable 1 milliGRAM(s) IV Push every 12 hours  cefTRIAXone   IVPB 1000 milliGRAM(s) IV Intermittent every 24 hours  dextrose 5%. 1000 milliLiter(s) IV Continuous <Continuous>  dextrose 5%. 1000 milliLiter(s) IV Continuous <Continuous>  dextrose 50% Injectable 25 Gram(s) IV Push once  dextrose 50% Injectable 12.5 Gram(s) IV Push once  dextrose 50% Injectable 25 Gram(s) IV Push once  dextrose Oral Gel 15 Gram(s) Oral once PRN  digoxin     Tablet 125 MICROGram(s) Oral every other day  finasteride 5 milliGRAM(s) Oral daily  glucagon  Injectable 1 milliGRAM(s) IntraMuscular once  insulin lispro (ADMELOG) corrective regimen sliding scale   SubCutaneous three times a day before meals  insulin lispro (ADMELOG) corrective regimen sliding scale   SubCutaneous at bedtime  metoprolol tartrate 12.5 milliGRAM(s) Oral two times a day  tamsulosin 0.8 milliGRAM(s) Oral at bedtime    -------------------------------------------------------------------------------------------  Cardiovascular Diagnostic Testing:    ECG: a fib    Echo: 2023  ------------------------------------------------------------------------  CONCLUSIONS:  1. Mitral annular calcification and calcified mitral  leaflets with normal diastolic opening. Mild mitral  regurgitation.  2. Normal trileaflet aortic valve. Minimal aortic  regurgitation.  3. Endocardial visualization enhanced with intravenous  injection of echo contrast (Definity). Severe global left  ventricular systolic dysfunction.  4. Normal right ventricular size and function.  ------------------------------------------------------------------------  Confirmed on  2023 - 15:46:11 by MARIEL Schmitt  ------------------------------------------------------------------------      Stress Testin2023  IMPRESSIONS:Abnormal Study  * Myocardial Perfusion SPECT results are abnormal.  * Review of raw data shows: The study is of good technical  quality.  * The leftventricle was markdely dilated at baseline.  There are large, severe defects in anterior and  anteroseptal, apical walls that are fixed, suggestive of  infarct.There is a medium sized, severe defect in proximal  to mid septal wall that is fixed, suggestive of infarct.  The best perfusion was in the lateral / inferolateral  wall. There was increased RV tracer uptake.  * Post-stress gated wall motion analysis was performed  (LVEF = 22 %;LVEDV = 190 ml.), revealing severe overall  hypokinesis. Therewas apical, anterior and anteroseptal  akinesis. There was proximal to mid septal akinesis. The  best motion was in the lateral and inferolateral walls. RV  size and function appeared normal.  ------------------------------------------------------------------------  Confirmed on  2023 - 18:22:21 by Gerardo eWldon M.D.  ------------------------------------------------------------------------      Cath:    Imaging:    CXR:  reviewed  -------------------------------------------------------------------------------------------  Assessment and Plan: 83M with pmhx of ICM, HFrEF (LVEF 20-25%), refused defibrillator, Afib on Eliquis, T2DM, CKD3, HTN, HLD, BPH, ?COPD presents with SOB with concern for ADHF.  1. HFrEF  - Continue Bumex 1mg IV BID  - Repeat TTE with persistent severe LV dysfunction  - Cont Metoprolol as below, unable to start additional GDMT at this time 2/2 BP and Cr  2. A fib  - c/w home dig, eliquis toprol, please check Dig levels daily as pt with ZACHARY  3.HTN: stable  4.HLD: on statin    Flash Issa MD  Fellow Physician   Cardiology Inpatient Consult Service     Please check [email or web address]: [email or web address]" for cardiology service schedule and contact information.

## 2023-08-15 NOTE — DIETITIAN INITIAL EVALUATION ADULT - ORAL INTAKE PTA/DIET HISTORY
Patient is Qatari speaking, prefers daughter by bedside for translation during visit. Patient reports weight loss, decrease po intake and appetite, starting a year ago, could not quantify amount of weight loss. Patient follows a Kosher diet at home, has no known food allergies.

## 2023-08-15 NOTE — DIETITIAN INITIAL EVALUATION ADULT - PERTINENT LABORATORY DATA
08-15    140  |  98  |  84<H>  ----------------------------<  191<H>  3.9   |  27  |  2.10<H>    Ca    10.2      15 Aug 2023 05:27  Phos  3.8     08-15  Mg     2.40     08-15    POCT Blood Glucose.: 253 mg/dL (08-15-23 @ 12:38)  A1C with Estimated Average Glucose Result: 7.4 % (02-13-23 @ 07:04)  A1C with Estimated Average Glucose Result: 7.7 % (02-12-23 @ 19:50)

## 2023-08-15 NOTE — DIETITIAN INITIAL EVALUATION ADULT - ADD RECOMMEND
1. Recommend change diet to low sodium, consistent carb, Kosher. Diet consistency defer to SLP/ team. Fluid needs per MD discretion.  2. Consider a speech and swallow eval , to determine diet consistency.  3. Recommend adding Glucerna 8oz 2x/day (440kcal, 20gm protein) for nutrient support.   4. Nutrition department to provide Mighty shake reduced sugar 4oz 2x/day  (400kcal, 14gm protein) for nutrient support.   5. Consider adding bowel regimen.   6. Monitor weight, labs, po intake and tolerance, bowel movement, skin integrity.   7. Encourage PO intake and honor food preferences as able.

## 2023-08-15 NOTE — DIETITIAN INITIAL EVALUATION ADULT - PERTINENT MEDS FT
MEDICATIONS  (STANDING):  apixaban 2.5 milliGRAM(s) Oral two times a day  aspirin  chewable 81 milliGRAM(s) Oral daily  buMETAnide Injectable 1 milliGRAM(s) IV Push every 12 hours  cefTRIAXone   IVPB 1000 milliGRAM(s) IV Intermittent every 24 hours  dextrose 5%. 1000 milliLiter(s) (100 mL/Hr) IV Continuous <Continuous>  dextrose 5%. 1000 milliLiter(s) (50 mL/Hr) IV Continuous <Continuous>  dextrose 50% Injectable 25 Gram(s) IV Push once  dextrose 50% Injectable 12.5 Gram(s) IV Push once  dextrose 50% Injectable 25 Gram(s) IV Push once  digoxin     Tablet 125 MICROGram(s) Oral every other day  finasteride 5 milliGRAM(s) Oral daily  glucagon  Injectable 1 milliGRAM(s) IntraMuscular once  insulin glargine Injectable (LANTUS) 5 Unit(s) SubCutaneous at bedtime  insulin lispro (ADMELOG) corrective regimen sliding scale   SubCutaneous three times a day before meals  insulin lispro (ADMELOG) corrective regimen sliding scale   SubCutaneous at bedtime  metoprolol tartrate 12.5 milliGRAM(s) Oral two times a day  tamsulosin 0.8 milliGRAM(s) Oral at bedtime    MEDICATIONS  (PRN):  acetaminophen     Tablet .. 650 milliGRAM(s) Oral every 6 hours PRN Temp greater or equal to 38C (100.4F), Mild Pain (1 - 3)  dextrose Oral Gel 15 Gram(s) Oral once PRN Blood Glucose LESS THAN 70 milliGRAM(s)/deciliter

## 2023-08-16 LAB
ANION GAP SERPL CALC-SCNC: 11 MMOL/L — SIGNIFICANT CHANGE UP (ref 7–14)
BASOPHILS # BLD AUTO: 0.05 K/UL — SIGNIFICANT CHANGE UP (ref 0–0.2)
BASOPHILS NFR BLD AUTO: 0.5 % — SIGNIFICANT CHANGE UP (ref 0–2)
BUN SERPL-MCNC: 91 MG/DL — HIGH (ref 7–23)
CALCIUM SERPL-MCNC: 9.7 MG/DL — SIGNIFICANT CHANGE UP (ref 8.4–10.5)
CHLORIDE SERPL-SCNC: 97 MMOL/L — LOW (ref 98–107)
CO2 SERPL-SCNC: 28 MMOL/L — SIGNIFICANT CHANGE UP (ref 22–31)
CREAT SERPL-MCNC: 2.08 MG/DL — HIGH (ref 0.5–1.3)
DIGOXIN SERPL-MCNC: 0.6 NG/ML — LOW (ref 0.8–2)
EGFR: 31 ML/MIN/1.73M2 — LOW
EOSINOPHIL # BLD AUTO: 0.35 K/UL — SIGNIFICANT CHANGE UP (ref 0–0.5)
EOSINOPHIL NFR BLD AUTO: 3.6 % — SIGNIFICANT CHANGE UP (ref 0–6)
GLUCOSE SERPL-MCNC: 176 MG/DL — HIGH (ref 70–99)
HCT VFR BLD CALC: 33.1 % — LOW (ref 39–50)
HGB BLD-MCNC: 10.4 G/DL — LOW (ref 13–17)
IANC: 5.94 K/UL — SIGNIFICANT CHANGE UP (ref 1.8–7.4)
IMM GRANULOCYTES NFR BLD AUTO: 0.2 % — SIGNIFICANT CHANGE UP (ref 0–0.9)
LYMPHOCYTES # BLD AUTO: 2.07 K/UL — SIGNIFICANT CHANGE UP (ref 1–3.3)
LYMPHOCYTES # BLD AUTO: 21.3 % — SIGNIFICANT CHANGE UP (ref 13–44)
MCHC RBC-ENTMCNC: 28.1 PG — SIGNIFICANT CHANGE UP (ref 27–34)
MCHC RBC-ENTMCNC: 31.4 GM/DL — LOW (ref 32–36)
MCV RBC AUTO: 89.5 FL — SIGNIFICANT CHANGE UP (ref 80–100)
MONOCYTES # BLD AUTO: 1.3 K/UL — HIGH (ref 0–0.9)
MONOCYTES NFR BLD AUTO: 13.4 % — SIGNIFICANT CHANGE UP (ref 2–14)
NEUTROPHILS # BLD AUTO: 5.94 K/UL — SIGNIFICANT CHANGE UP (ref 1.8–7.4)
NEUTROPHILS NFR BLD AUTO: 61 % — SIGNIFICANT CHANGE UP (ref 43–77)
NRBC # BLD: 0 /100 WBCS — SIGNIFICANT CHANGE UP (ref 0–0)
NRBC # FLD: 0 K/UL — SIGNIFICANT CHANGE UP (ref 0–0)
PLATELET # BLD AUTO: 152 K/UL — SIGNIFICANT CHANGE UP (ref 150–400)
POTASSIUM SERPL-MCNC: 3.7 MMOL/L — SIGNIFICANT CHANGE UP (ref 3.5–5.3)
POTASSIUM SERPL-SCNC: 3.7 MMOL/L — SIGNIFICANT CHANGE UP (ref 3.5–5.3)
RBC # BLD: 3.7 M/UL — LOW (ref 4.2–5.8)
RBC # FLD: 13.8 % — SIGNIFICANT CHANGE UP (ref 10.3–14.5)
SODIUM SERPL-SCNC: 136 MMOL/L — SIGNIFICANT CHANGE UP (ref 135–145)
WBC # BLD: 9.73 K/UL — SIGNIFICANT CHANGE UP (ref 3.8–10.5)
WBC # FLD AUTO: 9.73 K/UL — SIGNIFICANT CHANGE UP (ref 3.8–10.5)

## 2023-08-16 PROCEDURE — 99233 SBSQ HOSP IP/OBS HIGH 50: CPT

## 2023-08-16 RX ORDER — POLYETHYLENE GLYCOL 3350 17 G/17G
17 POWDER, FOR SOLUTION ORAL DAILY
Refills: 0 | Status: DISCONTINUED | OUTPATIENT
Start: 2023-08-16 | End: 2023-08-18

## 2023-08-16 RX ORDER — POTASSIUM CHLORIDE 20 MEQ
40 PACKET (EA) ORAL ONCE
Refills: 0 | Status: COMPLETED | OUTPATIENT
Start: 2023-08-16 | End: 2023-08-16

## 2023-08-16 RX ORDER — CEFTRIAXONE 500 MG/1
1000 INJECTION, POWDER, FOR SOLUTION INTRAMUSCULAR; INTRAVENOUS EVERY 24 HOURS
Refills: 0 | Status: COMPLETED | OUTPATIENT
Start: 2023-08-17 | End: 2023-08-17

## 2023-08-16 RX ORDER — PANTOPRAZOLE SODIUM 20 MG/1
40 TABLET, DELAYED RELEASE ORAL ONCE
Refills: 0 | Status: COMPLETED | OUTPATIENT
Start: 2023-08-16 | End: 2023-08-16

## 2023-08-16 RX ORDER — SENNA PLUS 8.6 MG/1
2 TABLET ORAL AT BEDTIME
Refills: 0 | Status: DISCONTINUED | OUTPATIENT
Start: 2023-08-16 | End: 2023-08-18

## 2023-08-16 RX ORDER — PANTOPRAZOLE SODIUM 20 MG/1
40 TABLET, DELAYED RELEASE ORAL
Refills: 0 | Status: DISCONTINUED | OUTPATIENT
Start: 2023-08-17 | End: 2023-08-18

## 2023-08-16 RX ORDER — POTASSIUM CHLORIDE 20 MEQ
40 PACKET (EA) ORAL ONCE
Refills: 0 | Status: DISCONTINUED | OUTPATIENT
Start: 2023-08-16 | End: 2023-08-16

## 2023-08-16 RX ADMIN — APIXABAN 2.5 MILLIGRAM(S): 2.5 TABLET, FILM COATED ORAL at 05:06

## 2023-08-16 RX ADMIN — BUMETANIDE 1 MILLIGRAM(S): 0.25 INJECTION INTRAMUSCULAR; INTRAVENOUS at 22:40

## 2023-08-16 RX ADMIN — PANTOPRAZOLE SODIUM 40 MILLIGRAM(S): 20 TABLET, DELAYED RELEASE ORAL at 19:58

## 2023-08-16 RX ADMIN — TAMSULOSIN HYDROCHLORIDE 0.8 MILLIGRAM(S): 0.4 CAPSULE ORAL at 22:39

## 2023-08-16 RX ADMIN — Medication 1: at 08:38

## 2023-08-16 RX ADMIN — Medication 1: at 17:27

## 2023-08-16 RX ADMIN — Medication 2: at 12:51

## 2023-08-16 RX ADMIN — BUMETANIDE 1 MILLIGRAM(S): 0.25 INJECTION INTRAMUSCULAR; INTRAVENOUS at 11:31

## 2023-08-16 RX ADMIN — SENNA PLUS 2 TABLET(S): 8.6 TABLET ORAL at 22:39

## 2023-08-16 RX ADMIN — Medication 125 MICROGRAM(S): at 11:26

## 2023-08-16 RX ADMIN — CEFTRIAXONE 100 MILLIGRAM(S): 500 INJECTION, POWDER, FOR SOLUTION INTRAMUSCULAR; INTRAVENOUS at 04:23

## 2023-08-16 RX ADMIN — FINASTERIDE 5 MILLIGRAM(S): 5 TABLET, FILM COATED ORAL at 11:26

## 2023-08-16 RX ADMIN — APIXABAN 2.5 MILLIGRAM(S): 2.5 TABLET, FILM COATED ORAL at 17:25

## 2023-08-16 RX ADMIN — INSULIN GLARGINE 5 UNIT(S): 100 INJECTION, SOLUTION SUBCUTANEOUS at 22:41

## 2023-08-16 RX ADMIN — Medication 40 MILLIEQUIVALENT(S): at 11:36

## 2023-08-16 RX ADMIN — POLYETHYLENE GLYCOL 3350 17 GRAM(S): 17 POWDER, FOR SOLUTION ORAL at 12:58

## 2023-08-16 RX ADMIN — Medication 81 MILLIGRAM(S): at 11:26

## 2023-08-16 NOTE — PROGRESS NOTE ADULT - SUBJECTIVE AND OBJECTIVE BOX
[facility] Division of Hospital Medicine  Wilton Waldron MD  Pager (M-F, 8A-5P): [phone]  Other Times:  [phone]    Patient is a 83y old  Male who presents with a chief complaint of Dyspnea, urinary retention (16 Aug 2023 08:17)      SUBJECTIVE / OVERNIGHT EVENTS: Pt doing ok tele with Afib. neg fluid balance       MEDICATIONS  (STANDING):  apixaban 2.5 milliGRAM(s) Oral two times a day  aspirin  chewable 81 milliGRAM(s) Oral daily  buMETAnide Injectable 1 milliGRAM(s) IV Push every 12 hours  dextrose 5%. 1000 milliLiter(s) (50 mL/Hr) IV Continuous <Continuous>  dextrose 5%. 1000 milliLiter(s) (100 mL/Hr) IV Continuous <Continuous>  dextrose 50% Injectable 25 Gram(s) IV Push once  dextrose 50% Injectable 12.5 Gram(s) IV Push once  dextrose 50% Injectable 25 Gram(s) IV Push once  digoxin     Tablet 125 MICROGram(s) Oral every other day  finasteride 5 milliGRAM(s) Oral daily  glucagon  Injectable 1 milliGRAM(s) IntraMuscular once  insulin glargine Injectable (LANTUS) 5 Unit(s) SubCutaneous at bedtime  insulin lispro (ADMELOG) corrective regimen sliding scale   SubCutaneous three times a day before meals  insulin lispro (ADMELOG) corrective regimen sliding scale   SubCutaneous at bedtime  metoprolol succinate ER 25 milliGRAM(s) Oral daily  polyethylene glycol 3350 17 Gram(s) Oral daily  senna 2 Tablet(s) Oral at bedtime  tamsulosin 0.8 milliGRAM(s) Oral at bedtime    MEDICATIONS  (PRN):  acetaminophen     Tablet .. 650 milliGRAM(s) Oral every 6 hours PRN Temp greater or equal to 38C (100.4F), Mild Pain (1 - 3)  dextrose Oral Gel 15 Gram(s) Oral once PRN Blood Glucose LESS THAN 70 milliGRAM(s)/deciliter      CAPILLARY BLOOD GLUCOSE      POCT Blood Glucose.: 244 mg/dL (16 Aug 2023 12:24)  POCT Blood Glucose.: 180 mg/dL (16 Aug 2023 08:24)  POCT Blood Glucose.: 154 mg/dL (15 Aug 2023 21:54)  POCT Blood Glucose.: 207 mg/dL (15 Aug 2023 17:29)    I&O's Summary    15 Aug 2023 07:01  -  16 Aug 2023 07:00  --------------------------------------------------------  IN: 0 mL / OUT: 700 mL / NET: -700 mL    16 Aug 2023 07:01  -  16 Aug 2023 16:48  --------------------------------------------------------  IN: 80 mL / OUT: 990 mL / NET: -910 mL        PHYSICAL EXAM:  Vital Signs Last 24 Hrs  T(C): 36.4 (16 Aug 2023 11:22), Max: 36.9 (15 Aug 2023 20:59)  T(F): 97.6 (16 Aug 2023 11:22), Max: 98.5 (15 Aug 2023 20:59)  HR: 88 (16 Aug 2023 11:22) (82 - 89)  BP: 108/56 (16 Aug 2023 11:22) (92/46 - 108/56)  BP(mean): --  RR: 17 (16 Aug 2023 11:22) (16 - 17)  SpO2: 98% (16 Aug 2023 11:22) (98% - 99%)    Parameters below as of 16 Aug 2023 05:15  Patient On (Oxygen Delivery Method): nasal cannula  O2 Flow (L/min): 2    CONSTITUTIONAL: cachetic   EYES:  conjunctiva and sclera clear  RESPIRATORY: CTA b/l    CARDIOVASCULAR: s1/s2   ABDOMEN: Nontender to palpation, normoactive bowel sounds    LABS:                        10.4   9.73  )-----------( 152      ( 16 Aug 2023 06:47 )             33.1     08-16    136  |  97<L>  |  91<H>  ----------------------------<  176<H>  3.7   |  28  |  2.08<H>    Ca    9.7      16 Aug 2023 06:47  Phos  3.8     08-15  Mg     2.40     08-15            Urinalysis Basic - ( 16 Aug 2023 06:47 )    Color: x / Appearance: x / SG: x / pH: x  Gluc: 176 mg/dL / Ketone: x  / Bili: x / Urobili: x   Blood: x / Protein: x / Nitrite: x   Leuk Esterase: x / RBC: x / WBC x   Sq Epi: x / Non Sq Epi: x / Bacteria: x          RADIOLOGY & ADDITIONAL TESTS:  Results Reviewed:   Imaging Personally Reviewed:  Electrocardiogram Personally Reviewed:    COORDINATION OF CARE:  Care Discussed with Consultants/Other Providers [Y/N]:  Prior or Outpatient Records Reviewed [Y/N]:

## 2023-08-16 NOTE — PHARMACOTHERAPY INTERVENTION NOTE - COMMENTS
Current medications reviewed with the patient and his family. Patient's son states that he is a pharmacist in [place]. Current medication schedule was discussed in detail including: medication name, indication, dose, administration times, treatment duration, side effects, and special instructions. Also discussed CHF management. Patient counseled on heart failure medication indications, administration, and side effects. Also discussed fluid and salt restrictions, and maintaining a log of daily weights. Discussed warning signs of fluid overload (SOB, orthopnea, PARIKH, edema, etc.) and when to seek medical attention. Patient and family verbalized understanding. Questions and concerns were answered and addressed. Patient provided with CHF booklet.    Essie Rodriguez, BlasD

## 2023-08-16 NOTE — PROGRESS NOTE ADULT - SUBJECTIVE AND OBJECTIVE BOX
Cardiology Progress Note  ------------------------------------------------------------------------------------------  SUBJECTIVE:   No events overnight. Denies CP, SOB or Palpitations.   Tele: a fib, rate controlled  -------------------------------------------------------------------------------------------  ROS:  CV: chest pain (-), palpitation (-), orthopnea (-), PND (-), edema (-)  PULM: SOB (-), cough (-), wheezing (-), hemoptysis (-).   CONST: fever (-), chills (-) or fatigue (-)  GI: abdominal distension (-), abdominal pain (-) , nausea/vomiting (-), hematemesis, (-), melena (-), hematochezia (-)  : dysuria (-), frequency (-), hematuria (-).   NEURO: numbness (-), weakness (-), dizziness (-)  MSK: myalgia (-), joint pain (-)   SKIN: itching (-), rash (-)  HEENT:  visual changes (-); vertigo or throat pain (-);  neck stiffness (-)   Psych: change in mood (-), anxiety (-), depression (-)     All other review of systems is negative unless indicated above.   -------------------------------------------------------------------------------------------  VS:  T(F): 97.7 (), Max: 98.9 (08-15)  HR: 82 () (78 - 89)  BP: 92/51 (08-16) (92/46 - 108/53)  RR: 16 ()  SpO2: 99% ()  I&O's Summary    15 Aug 2023 07:01  -  16 Aug 2023 07:00  --------------------------------------------------------  IN: 0 mL / OUT: 700 mL / NET: -700 mL      ------------------------------------------------------------------------------------------  PHYSICAL EXAM:  General: No acute distress. Awake and conversant.   Eyes: Normal conjunctiva, anicteric. Round symmetric pupils.   ENT: Hearing grossly intact. No nasal discharge.   Neck: Neck is supple. No masses or thyromegaly.   Respiratory: Respirations are non-labored. R basilar crackles  Skin: Warm. No rashes or ulcers.   Psych: Alert and oriented. Cooperative, Appropriate mood and affect, Normal judgment.   CV: Irregularly irregular, no MRG, No lower extremity edema.   MSK: Normal ambulation. No clubbing or cyanosis.   Neuro: Sensation and CN II-XII grossly normal.  -------------------------------------------------------------------------------------------  LABS:                          10.4   9.73  )-----------( 152      ( 16 Aug 2023 06:47 )             33.1     08-15    140  |  98  |  84<H>  ----------------------------<  191<H>  3.9   |  27  |  2.10<H>    Ca    10.2      15 Aug 2023 05:27  Phos  3.8     08-15  Mg     2.40     08-15        CARDIAC MARKERS ( 13 Aug 2023 06:25 )  44 ng/L / x     / x     / x     / x     / x      CARDIAC MARKERS ( 13 Aug 2023 04:13 )  46 ng/L / x     / x     / x     / x     / x                -------------------------------------------------------------------------------------------  Meds:  acetaminophen     Tablet .. 650 milliGRAM(s) Oral every 6 hours PRN  apixaban 2.5 milliGRAM(s) Oral two times a day  aspirin  chewable 81 milliGRAM(s) Oral daily  buMETAnide Injectable 1 milliGRAM(s) IV Push every 12 hours  dextrose 5%. 1000 milliLiter(s) IV Continuous <Continuous>  dextrose 5%. 1000 milliLiter(s) IV Continuous <Continuous>  dextrose 50% Injectable 25 Gram(s) IV Push once  dextrose 50% Injectable 12.5 Gram(s) IV Push once  dextrose 50% Injectable 25 Gram(s) IV Push once  dextrose Oral Gel 15 Gram(s) Oral once PRN  digoxin     Tablet 125 MICROGram(s) Oral every other day  finasteride 5 milliGRAM(s) Oral daily  glucagon  Injectable 1 milliGRAM(s) IntraMuscular once  insulin glargine Injectable (LANTUS) 5 Unit(s) SubCutaneous at bedtime  insulin lispro (ADMELOG) corrective regimen sliding scale   SubCutaneous three times a day before meals  insulin lispro (ADMELOG) corrective regimen sliding scale   SubCutaneous at bedtime  metoprolol succinate ER 25 milliGRAM(s) Oral daily  polyethylene glycol 3350 17 Gram(s) Oral daily  senna 2 Tablet(s) Oral at bedtime  tamsulosin 0.8 milliGRAM(s) Oral at bedtime    -------------------------------------------------------------------------------------------  Cardiovascular Diagnostic Testing:    ECG: a fib    Echo: 2023  ------------------------------------------------------------------------  CONCLUSIONS:  1. Mitral annular calcification and calcified mitral  leaflets with normal diastolic opening. Mild mitral  regurgitation.  2. Normal trileaflet aortic valve. Minimal aortic  regurgitation.  3. Endocardial visualization enhanced with intravenous  injection of echo contrast (Definity). Severe global left  ventricular systolic dysfunction.  4. Normal right ventricular size and function.  ------------------------------------------------------------------------  Confirmed on  2023 - 15:46:11 by José Vasquez M.D. RPVI  ------------------------------------------------------------------------      Stress Testin2023  IMPRESSIONS:Abnormal Study  * Myocardial Perfusion SPECT results are abnormal.  * Review of raw data shows: The study is of good technical  quality.  * The leftventricle was markdely dilated at baseline.  There are large, severe defects in anterior and  anteroseptal, apical walls that are fixed, suggestive of  infarct.There is a medium sized, severe defect in proximal  to mid septal wall that is fixed, suggestive of infarct.  The best perfusion was in the lateral / inferolateral  wall. There was increased RV tracer uptake.  * Post-stress gated wall motion analysis was performed  (LVEF = 22 %;LVEDV = 190 ml.), revealing severe overall  hypokinesis. Therewas apical, anterior and anteroseptal  akinesis. There was proximal to mid septal akinesis. The  best motion was in the lateral and inferolateral walls. RV  size and function appeared normal.  ------------------------------------------------------------------------  Confirmed on  2023 - 18:22:21 by Gerardo Weldon M.D.  ------------------------------------------------------------------------        Cath:    Imaging:    CXR:  reviewed  -------------------------------------------------------------------------------------------  Assessment and Plan: 83M with pmhx of ICM, HFrEF (LVEF 20-25%), refused defibrillator, Afib on Eliquis, T2DM, CKD3, HTN, HLD, BPH, ?COPD presents with SOB with concern for ADHF.  1. HFrEF  - Continue Bumex 1mg IV BID  - Repeat TTE with persistent severe LV dysfunction  - Cont Metoprolol as below, unable to start additional GDMT at this time 2/2 BP and Cr  2. A fib  - c/w home dig, eliquis toprol, please check Dig levels daily as pt with ZACHARY  3.HTN: stable  4.HLD: on statin    Flash Issa MD  Fellow Physician   Cardiology Inpatient Consult Service     Please check [email or web address]: [email or web address]" for cardiology service schedule and contact information.

## 2023-08-17 ENCOUNTER — TRANSCRIPTION ENCOUNTER (OUTPATIENT)
Age: 83
End: 2023-08-17

## 2023-08-17 LAB
ANION GAP SERPL CALC-SCNC: 12 MMOL/L — SIGNIFICANT CHANGE UP (ref 7–14)
BUN SERPL-MCNC: 89 MG/DL — HIGH (ref 7–23)
CALCIUM SERPL-MCNC: 9.5 MG/DL — SIGNIFICANT CHANGE UP (ref 8.4–10.5)
CHLORIDE SERPL-SCNC: 100 MMOL/L — SIGNIFICANT CHANGE UP (ref 98–107)
CO2 SERPL-SCNC: 29 MMOL/L — SIGNIFICANT CHANGE UP (ref 22–31)
CREAT SERPL-MCNC: 2 MG/DL — HIGH (ref 0.5–1.3)
DIGOXIN SERPL-MCNC: 0.5 NG/ML — LOW (ref 0.8–2)
EGFR: 32 ML/MIN/1.73M2 — LOW
GLUCOSE SERPL-MCNC: 165 MG/DL — HIGH (ref 70–99)
HCT VFR BLD CALC: 34.1 % — LOW (ref 39–50)
HGB BLD-MCNC: 10.4 G/DL — LOW (ref 13–17)
MAGNESIUM SERPL-MCNC: 2.3 MG/DL — SIGNIFICANT CHANGE UP (ref 1.6–2.6)
MCHC RBC-ENTMCNC: 27.2 PG — SIGNIFICANT CHANGE UP (ref 27–34)
MCHC RBC-ENTMCNC: 30.5 GM/DL — LOW (ref 32–36)
MCV RBC AUTO: 89.3 FL — SIGNIFICANT CHANGE UP (ref 80–100)
NRBC # BLD: 0 /100 WBCS — SIGNIFICANT CHANGE UP (ref 0–0)
NRBC # FLD: 0 K/UL — SIGNIFICANT CHANGE UP (ref 0–0)
PHOSPHATE SERPL-MCNC: 3.2 MG/DL — SIGNIFICANT CHANGE UP (ref 2.5–4.5)
PLATELET # BLD AUTO: 150 K/UL — SIGNIFICANT CHANGE UP (ref 150–400)
POTASSIUM SERPL-MCNC: 3.9 MMOL/L — SIGNIFICANT CHANGE UP (ref 3.5–5.3)
POTASSIUM SERPL-SCNC: 3.9 MMOL/L — SIGNIFICANT CHANGE UP (ref 3.5–5.3)
RBC # BLD: 3.82 M/UL — LOW (ref 4.2–5.8)
RBC # FLD: 13.9 % — SIGNIFICANT CHANGE UP (ref 10.3–14.5)
SODIUM SERPL-SCNC: 141 MMOL/L — SIGNIFICANT CHANGE UP (ref 135–145)
WBC # BLD: 7.9 K/UL — SIGNIFICANT CHANGE UP (ref 3.8–10.5)
WBC # FLD AUTO: 7.9 K/UL — SIGNIFICANT CHANGE UP (ref 3.8–10.5)

## 2023-08-17 PROCEDURE — 99232 SBSQ HOSP IP/OBS MODERATE 35: CPT

## 2023-08-17 RX ORDER — METOPROLOL TARTRATE 50 MG
12.5 TABLET ORAL DAILY
Refills: 0 | Status: DISCONTINUED | OUTPATIENT
Start: 2023-08-18 | End: 2023-08-18

## 2023-08-17 RX ORDER — BUMETANIDE 0.25 MG/ML
1 INJECTION INTRAMUSCULAR; INTRAVENOUS ONCE
Refills: 0 | Status: DISCONTINUED | OUTPATIENT
Start: 2023-08-17 | End: 2023-08-17

## 2023-08-17 RX ORDER — BUMETANIDE 0.25 MG/ML
1 INJECTION INTRAMUSCULAR; INTRAVENOUS DAILY
Refills: 0 | Status: DISCONTINUED | OUTPATIENT
Start: 2023-08-18 | End: 2023-08-18

## 2023-08-17 RX ORDER — METOPROLOL TARTRATE 50 MG
12.5 TABLET ORAL
Refills: 0 | Status: DISCONTINUED | OUTPATIENT
Start: 2023-08-17 | End: 2023-08-17

## 2023-08-17 RX ADMIN — Medication 81 MILLIGRAM(S): at 12:14

## 2023-08-17 RX ADMIN — Medication 1: at 09:17

## 2023-08-17 RX ADMIN — TAMSULOSIN HYDROCHLORIDE 0.8 MILLIGRAM(S): 0.4 CAPSULE ORAL at 22:58

## 2023-08-17 RX ADMIN — INSULIN GLARGINE 5 UNIT(S): 100 INJECTION, SOLUTION SUBCUTANEOUS at 22:59

## 2023-08-17 RX ADMIN — POLYETHYLENE GLYCOL 3350 17 GRAM(S): 17 POWDER, FOR SOLUTION ORAL at 12:13

## 2023-08-17 RX ADMIN — APIXABAN 2.5 MILLIGRAM(S): 2.5 TABLET, FILM COATED ORAL at 05:28

## 2023-08-17 RX ADMIN — BUMETANIDE 1 MILLIGRAM(S): 0.25 INJECTION INTRAMUSCULAR; INTRAVENOUS at 09:17

## 2023-08-17 RX ADMIN — Medication 2: at 13:10

## 2023-08-17 RX ADMIN — PANTOPRAZOLE SODIUM 40 MILLIGRAM(S): 20 TABLET, DELAYED RELEASE ORAL at 05:28

## 2023-08-17 RX ADMIN — FINASTERIDE 5 MILLIGRAM(S): 5 TABLET, FILM COATED ORAL at 12:13

## 2023-08-17 RX ADMIN — SENNA PLUS 2 TABLET(S): 8.6 TABLET ORAL at 22:58

## 2023-08-17 RX ADMIN — CEFTRIAXONE 100 MILLIGRAM(S): 500 INJECTION, POWDER, FOR SOLUTION INTRAMUSCULAR; INTRAVENOUS at 05:28

## 2023-08-17 RX ADMIN — APIXABAN 2.5 MILLIGRAM(S): 2.5 TABLET, FILM COATED ORAL at 18:03

## 2023-08-17 RX ADMIN — Medication 1: at 18:03

## 2023-08-17 NOTE — PROGRESS NOTE ADULT - SUBJECTIVE AND OBJECTIVE BOX
[facility] Division of Hospital Medicine  Wilton Waldron MD  Pager (M-F, 8A-5P): [phone]  Other Times:  [phone]    Patient is a 83y old  Male who presents with a chief complaint of Dyspnea, urinary retention (17 Aug 2023 09:50)      SUBJECTIVE / OVERNIGHT EVENTS: pt undergoing TOV. neg fluid balance       MEDICATIONS  (STANDING):  apixaban 2.5 milliGRAM(s) Oral two times a day  aspirin  chewable 81 milliGRAM(s) Oral daily  dextrose 5%. 1000 milliLiter(s) (100 mL/Hr) IV Continuous <Continuous>  dextrose 5%. 1000 milliLiter(s) (50 mL/Hr) IV Continuous <Continuous>  dextrose 50% Injectable 25 Gram(s) IV Push once  dextrose 50% Injectable 12.5 Gram(s) IV Push once  dextrose 50% Injectable 25 Gram(s) IV Push once  digoxin     Tablet 125 MICROGram(s) Oral every other day  finasteride 5 milliGRAM(s) Oral daily  glucagon  Injectable 1 milliGRAM(s) IntraMuscular once  insulin glargine Injectable (LANTUS) 5 Unit(s) SubCutaneous at bedtime  insulin lispro (ADMELOG) corrective regimen sliding scale   SubCutaneous three times a day before meals  insulin lispro (ADMELOG) corrective regimen sliding scale   SubCutaneous at bedtime  metoprolol tartrate 12.5 milliGRAM(s) Oral two times a day  pantoprazole    Tablet 40 milliGRAM(s) Oral before breakfast  polyethylene glycol 3350 17 Gram(s) Oral daily  senna 2 Tablet(s) Oral at bedtime  tamsulosin 0.8 milliGRAM(s) Oral at bedtime    MEDICATIONS  (PRN):  acetaminophen     Tablet .. 650 milliGRAM(s) Oral every 6 hours PRN Temp greater or equal to 38C (100.4F), Mild Pain (1 - 3)  dextrose Oral Gel 15 Gram(s) Oral once PRN Blood Glucose LESS THAN 70 milliGRAM(s)/deciliter      CAPILLARY BLOOD GLUCOSE      POCT Blood Glucose.: 194 mg/dL (17 Aug 2023 08:36)  POCT Blood Glucose.: 188 mg/dL (16 Aug 2023 22:29)  POCT Blood Glucose.: 169 mg/dL (16 Aug 2023 17:04)    I&O's Summary    16 Aug 2023 07:01  -  17 Aug 2023 07:00  --------------------------------------------------------  IN: 320 mL / OUT: 2190 mL / NET: -1870 mL    17 Aug 2023 07:01  -  17 Aug 2023 12:27  --------------------------------------------------------  IN: 0 mL / OUT: 250 mL / NET: -250 mL        PHYSICAL EXAM:  Vital Signs Last 24 Hrs  T(C): 36.3 (17 Aug 2023 12:12), Max: 36.7 (16 Aug 2023 22:30)  T(F): 97.3 (17 Aug 2023 12:12), Max: 98 (16 Aug 2023 22:30)  HR: 78 (17 Aug 2023 12:12) (78 - 91)  BP: 96/46 (17 Aug 2023 12:12) (90/45 - 108/63)  BP(mean): --  RR: 17 (17 Aug 2023 12:12) (16 - 17)  SpO2: 98% (17 Aug 2023 12:12) (98% - 99%)    Parameters below as of 17 Aug 2023 09:15  Patient On (Oxygen Delivery Method): nasal cannula  O2 Flow (L/min): 2    CONSTITUTIONAL: cachetic   EYES:  conjunctiva and sclera clear  RESPIRATORY: CTA b/l    CARDIOVASCULAR: s1/s2   ABDOMEN:  normoactive bowel sounds + suprapubic tenderness     LABS:                        10.4   7.90  )-----------( 150      ( 17 Aug 2023 05:00 )             34.1     08-17    141  |  100  |  89<H>  ----------------------------<  165<H>  3.9   |  29  |  2.00<H>    Ca    9.5      17 Aug 2023 05:00  Phos  3.2     08-17  Mg     2.30     08-17            Urinalysis Basic - ( 17 Aug 2023 05:00 )    Color: x / Appearance: x / SG: x / pH: x  Gluc: 165 mg/dL / Ketone: x  / Bili: x / Urobili: x   Blood: x / Protein: x / Nitrite: x   Leuk Esterase: x / RBC: x / WBC x   Sq Epi: x / Non Sq Epi: x / Bacteria: x          RADIOLOGY & ADDITIONAL TESTS:  Results Reviewed:   Imaging Personally Reviewed:  Electrocardiogram Personally Reviewed:    COORDINATION OF CARE:  Care Discussed with Consultants/Other Providers [Y/N]:  Prior or Outpatient Records Reviewed [Y/N]:

## 2023-08-17 NOTE — DISCHARGE NOTE PROVIDER - CARE PROVIDER_API CALL
Flavio Tucker  Cardiovascular Disease  [address] [place] [address]  Phone: [phone]  Fax: [phone]  Follow Up Time: 2 weeks   Flavio Tucker  Cardiovascular Disease  [address] [place] [address]  Phone: [phone]  Fax: [phone]  Scheduled Appointment: 09/01/2023 11:40 AM    Hoenig, David Mayer  Urology  [address] [place] [address]  Phone: [phone]  Fax: [phone]  Follow Up Time: 1 week    Your PCP,   Phone: (   )    -  Fax: (   )    -  Follow Up Time: 1 week    Mike Catalan  Cardiovascular Disease  [address] [facility]  [place] [address]  Phone: [phone]  Fax: [phone]  Follow Up Time:     Tina Adamson  Cardiology  [address] [facility] [place] [address]  Phone: [phone]  Fax: [phone]  Follow Up Time:

## 2023-08-17 NOTE — DISCHARGE NOTE PROVIDER - HOSPITAL COURSE
82 y/o M with pmh of AF on Eliquis, CHF (EF 20-25%) on 2L home O2 DM2, HTN, BPH and kidney stones p/w dyspnea and decreased urination and urinary retention       Problem/Plan - 1:  ·  Problem: Acute on chronic systolic congestive heart failure.   - repeat TTE with  severe LV dysfunction   - improved with IV diuretic change to PO diuretics as per cards  - metoprolol 12.5 BID due to borderline BP. unable to tolerate ARB/ACE due to BP   -  [place] Speaking Cardiologist at [facility] Dr. Mike Catalan or Dr. Tina Adamson for followup within 2 weeks of discharge.     Problem/Plan - 2:  ·  Problem: Acute UTI.   ·  Plan: - noted flank pain on admission and urinary retention   - recently started on levaquin by PCP   - renal US with enlarged prostate w/o overt abscess   - UCx <10 K urogential tae due to previously on antibiotics   - s/p ceftriaxone 5/5  - WBC downtrend   - TOV butcher removed notes suprapubic pain unable to do straight cath for retention and butcher placed outpt f/u with urology.     Problem/Plan - 3:  ·  Problem: Type 2 diabetes mellitus.   ·  Plan: - previous A1c 7.4 2/23  - restart home regimen on discharge.     Problem/Plan - 4:  ·  Problem: Chronic kidney disease, unspecified CKD stage.   ·  Plan: - baseline Cr 1.8-2.4   - US with enalrged prostate   - Cr improving with diuresis and butcher.     Problem/Plan - 5:  ·  Problem: BPH with urinary obstruction.   ·  Plan: - s/p Butcher catheter  - tamsulosin and finasteride  - renal US without stones enlarged prostate  - TOV failed outpt f/u urology for TOV.     Problem/Plan - 6:  ·  Problem: Chronic atrial fibrillation.   ·  Plan: - continue Eliquis, digoxin, and metoprolol.       Additional Information:  Additional Information: discharge 40 min

## 2023-08-17 NOTE — DISCHARGE NOTE PROVIDER - NSDCFUSCHEDAPPT_GEN_ALL_CORE_FT
Flavio Tucker  [place] Physician [facility]  HEARTVASC [address]  Scheduled Appointment: 09/01/2023    Hoenig, David  [place] Physician [facility]  UROLOGY [address] [facility]  Scheduled Appointment: 09/07/2023

## 2023-08-17 NOTE — DISCHARGE NOTE PROVIDER - NSDCCPTREATMENT_GEN_ALL_CORE_FT
PRINCIPAL PROCEDURE  Procedure: US kidney complete  Findings and Treatment: FINDINGS:  Right kidney: 9.3 cm. A simple renal cyst in the upper pole measuring 2.6   cm.  Left kidney: 10.2 cm. Multiple cysts in the left kidney, largest   measuring 1.5 cm with associated coarse calcification.  Urinary bladder: Within normal limits with Drew.  Prostate: Markedly enlarged with a volume of 104 chief compliant.  IMPRESSION:  Bilateral renal cysts.  Marked prostate gland enlargement.  --- End of Report ---< from: US Kidney and Bladder (08.14.23 @ 09:10) >  < end of copied text >        SECONDARY PROCEDURE  Procedure: Transthoracic echo  Findings and Treatment:   < end of copied text >  ------------------------------------------------------------------------  PROCEDURE: Transthoracic echocardiogram with 2-D, M-Mode  and complete spectral and color flow Doppler.  Intravenous ultrasound enhancing agent was administered for  improved left ventricular endocardial border definition.  Following the intravenous injection of ultrasound enhancing  agent, harmonic imaging was performed.  INDICATION: Heart failure, unspecified (I50.9)  ------------------------------------------------------------------------  OBSERVATIONS:  Mitral Valve: Mitral annular calcification and calcified  mitral leaflets with normal diastolic opening. Mild mitral  regurgitation.  Aortic Root: Normal aortic root.  Aortic Valve: Calcified trileaflet aortic valve with normal  opening. Minimal aortic regurgitation.  Left Atrium: Normal left atrium.  Left Ventricle: Endocardial visualization enhanced with  intravenous injection of echo contrast (Definity). Severe  global left ventricular systolic dysfunction. Normal left  ventricular internal dimensions and wall thicknesses.  (DT:132 ms).  Right Heart: Normal right atrium. Normal right ventricular  size and function. Normal tricuspid valve. Normal pulmonic  valve.  Pericardium/PleuraNormal pericardium with no pericardial  effusion.  ------------------------------------------------------------------------  CONCLUSIONS:  1. Mitral annular calcification and calcified mitral  leaflets with normal diastolic opening. Mild mitral  regurgitation.  2. Normal trileaflet aortic valve. Minimal aortic  regurgitation.  3. Endocardial visualization enhanced with intravenous  injection of echo contrast (Definity). Severe global left  ventricular systolic dysfunction.  4. Normal right ventricular size and function.  ------------------------------------------------------------------------< from: TTE with Doppler (w/Cont) (08.14.23 @ 12:44) >

## 2023-08-17 NOTE — DISCHARGE NOTE PROVIDER - NSDCCPCAREPLAN_GEN_ALL_CORE_FT
PRINCIPAL DISCHARGE DIAGNOSIS  Diagnosis: CHF exacerbation  Assessment and Plan of Treatment: you were treated with IV diuretics and improved. please f/u with cardiology outpt      SECONDARY DISCHARGE DIAGNOSES  Diagnosis: Infection of urinary tract  Assessment and Plan of Treatment: you were treated for infection in urine

## 2023-08-17 NOTE — DISCHARGE NOTE PROVIDER - DISCHARGE DIET
DASH Diet/Low Sodium Diet/Consistent Carbohydrate Diabetic Diets/Moderately Thick Liquids/Other Diet Instructions

## 2023-08-17 NOTE — DISCHARGE NOTE PROVIDER - CARE PROVIDERS DIRECT ADDRESSES
,[email or web address],[email or web address],[email or web address]

## 2023-08-17 NOTE — DISCHARGE NOTE PROVIDER - NSDCMRMEDTOKEN_GEN_ALL_CORE_FT
aspirin 81 mg oral tablet: orally once a day  BUMETANIDE 1MG TAB: 1 tab(s) orally 2 times a day  DIGOXIN 0.125MG TAB: 1 tab(s) orally every 48 hours  dutasteride 0.5 mg oral capsule: 1 cap(s) orally once a day  ELIQUIS 2.5MG TAB: 1 tab(s) orally 2 times a day  HumaLOG Mix 75/25 subcutaneous suspension: 5 unit(s) subcutaneous 2 times a day     OR per fingersticks   linagliptin 5 mg oral tablet: 1 tab(s) orally once a day  Lopressor: 12.5 milligram(s) orally 2 times a day  tamsulosin 0.4 mg oral capsule: 2 cap(s) orally once a day (at bedtime)  Vascepa:   Vitamin D3:    aspirin 81 mg oral tablet: orally once a day  BUMETANIDE 1MG TAB: 1 tab(s) orally once a day  DIGOXIN 0.125MG TAB: 1 tab(s) orally every 48 hours  dutasteride 0.5 mg oral capsule: 1 cap(s) orally once a day  ELIQUIS 2.5MG TAB: 1 tab(s) orally 2 times a day  HumaLOG Mix 75/25 subcutaneous suspension: 5 unit(s) subcutaneous 2 times a day     OR per fingersticks   linagliptin 5 mg oral tablet: 1 tab(s) orally once a day  Lopressor: 12.5 milligram(s) orally 2 times a day  tamsulosin 0.4 mg oral capsule: 2 cap(s) orally once a day (at bedtime)  Vascepa:   Vitamin D3:    aspirin 81 mg oral tablet: orally once a day  BUMETANIDE 1MG TAB: 1 tab(s) orally once a day  DIGOXIN 0.125MG TAB: 1 tab(s) orally every 48 hours  dutasteride 0.5 mg oral capsule: 1 cap(s) orally once a day  ELIQUIS 2.5MG TAB: 1 tab(s) orally 2 times a day  HumaLOG Mix 75/25 subcutaneous suspension: 5 unit(s) subcutaneous 2 times a day     OR per fingersticks   linagliptin 5 mg oral tablet: 1 tab(s) orally once a day  pantoprazole 40 mg oral delayed release tablet: 1 tab(s) orally once a day (before a meal)  polyethylene glycol 3350 oral powder for reconstitution: 17 gram(s) orally once a day  senna leaf extract oral tablet: 2 tab(s) orally once a day (at bedtime)  tamsulosin 0.4 mg oral capsule: 2 cap(s) orally once a day (at bedtime)  Toprol-XL 25 mg oral tablet, extended release: 0.5 tab(s) orally once a day  Vascepa:   Vitamin D3:

## 2023-08-17 NOTE — PROGRESS NOTE ADULT - SUBJECTIVE AND OBJECTIVE BOX
Cardiology Progress Note  ------------------------------------------------------------------------------------------  SUBJECTIVE:   No events overnight. Denies CP, SOB or Palpitations.   Tele: Fib with PVCs, rate controlled  -------------------------------------------------------------------------------------------  ROS:  CV: chest pain (-), palpitation (-), orthopnea (-), PND (-), edema (-)  PULM: SOB (-), cough (-), wheezing (-), hemoptysis (-).   CONST: fever (-), chills (-) or fatigue (-)  GI: abdominal distension (-), abdominal pain (-) , nausea/vomiting (-), hematemesis, (-), melena (-), hematochezia (-)  : dysuria (-), frequency (-), hematuria (-).   NEURO: numbness (-), weakness (-), dizziness (-)  MSK: myalgia (-), joint pain (-)   SKIN: itching (-), rash (-)  HEENT:  visual changes (-); vertigo or throat pain (-);  neck stiffness (-)   Psych: change in mood (-), anxiety (-), depression (-)     All other review of systems is negative unless indicated above.   -------------------------------------------------------------------------------------------  VS:  T(F): 98 (), Max: 98 ()  HR: 91 () (78 - 91)  BP: 108/63 () (90/45 - 108/63)  RR: 17 ()  SpO2: 98% ()  I&O's Summary    16 Aug 2023 07:01  -  17 Aug 2023 07:00  --------------------------------------------------------  IN: 320 mL / OUT: 2190 mL / NET: -1870 mL      ------------------------------------------------------------------------------------------  PHYSICAL EXAM:  General: No acute distress. Awake and conversant.   Eyes: Normal conjunctiva, anicteric. Round symmetric pupils.   ENT: Hearing grossly intact. No nasal discharge.   Neck: Neck is supple. No masses or thyromegaly.   Respiratory: Respirations are non-labored. No wheezing.   Skin: Warm. No rashes or ulcers.   Psych: Alert and oriented. Cooperative, Appropriate mood and affect, Normal judgment.   CV: Irregularly irregular, no MRG, No lower extremity edema.   MSK: Normal ambulation. No clubbing or cyanosis.   Neuro: Sensation and CN II-XII grossly normal.  -------------------------------------------------------------------------------------------  LABS:                          10.4   7.90  )-----------( 150      ( 17 Aug 2023 05:00 )             34.1     08-17    141  |  100  |  89<H>  ----------------------------<  165<H>  3.9   |  29  |  2.00<H>    Ca    9.5      17 Aug 2023 05:00  Phos  3.2     08  Mg     2.30     0817        CARDIAC MARKERS ( 13 Aug 2023 06:25 )  44 ng/L / x     / x     / x     / x     / x      CARDIAC MARKERS ( 13 Aug 2023 04:13 )  46 ng/L / x     / x     / x     / x     / x                -------------------------------------------------------------------------------------------  Meds:  acetaminophen     Tablet .. 650 milliGRAM(s) Oral every 6 hours PRN  apixaban 2.5 milliGRAM(s) Oral two times a day  aspirin  chewable 81 milliGRAM(s) Oral daily  buMETAnide Injectable 1 milliGRAM(s) IV Push every 12 hours  dextrose 5%. 1000 milliLiter(s) IV Continuous <Continuous>  dextrose 5%. 1000 milliLiter(s) IV Continuous <Continuous>  dextrose 50% Injectable 25 Gram(s) IV Push once  dextrose 50% Injectable 12.5 Gram(s) IV Push once  dextrose 50% Injectable 25 Gram(s) IV Push once  dextrose Oral Gel 15 Gram(s) Oral once PRN  digoxin     Tablet 125 MICROGram(s) Oral every other day  finasteride 5 milliGRAM(s) Oral daily  glucagon  Injectable 1 milliGRAM(s) IntraMuscular once  insulin glargine Injectable (LANTUS) 5 Unit(s) SubCutaneous at bedtime  insulin lispro (ADMELOG) corrective regimen sliding scale   SubCutaneous three times a day before meals  insulin lispro (ADMELOG) corrective regimen sliding scale   SubCutaneous at bedtime  metoprolol succinate ER 25 milliGRAM(s) Oral daily  pantoprazole    Tablet 40 milliGRAM(s) Oral before breakfast  polyethylene glycol 3350 17 Gram(s) Oral daily  senna 2 Tablet(s) Oral at bedtime  tamsulosin 0.8 milliGRAM(s) Oral at bedtime    -------------------------------------------------------------------------------------------  Cardiovascular Diagnostic Testing:    ECG: a fib    Echo: 2023  ------------------------------------------------------------------------  CONCLUSIONS:  1. Mitral annular calcification and calcified mitral  leaflets with normal diastolic opening. Mild mitral  regurgitation.  2. Normal trileaflet aortic valve. Minimal aortic  regurgitation.  3. Endocardial visualization enhanced with intravenous  injection of echo contrast (Definity). Severe global left  ventricular systolic dysfunction.  4. Normal right ventricular size and function.  ------------------------------------------------------------------------  Confirmed on  2023 - 15:46:11 by MARIEL Schmitt  ------------------------------------------------------------------------      Stress Testin2023  IMPRESSIONS:Abnormal Study  * Myocardial Perfusion SPECT results are abnormal.  * Review of raw data shows: The study is of good technical  quality.  * The leftventricle was markdely dilated at baseline.  There are large, severe defects in anterior and  anteroseptal, apical walls that are fixed, suggestive of  infarct.There is a medium sized, severe defect in proximal  to mid septal wall that is fixed, suggestive of infarct.  The best perfusion was in the lateral / inferolateral  wall. There was increased RV tracer uptake.  * Post-stress gated wall motion analysis was performed  (LVEF = 22 %;LVEDV = 190 ml.), revealing severe overall  hypokinesis. Therewas apical, anterior and anteroseptal  akinesis. There was proximal to mid septal akinesis. The  best motion was in the lateral and inferolateral walls. RV  size and function appeared normal.  ------------------------------------------------------------------------  Confirmed on  2023 - 18:22:21 by Gerardo Weldno M.D.  ------------------------------------------------------------------------    Cath:    Imaging:    CXR:  reviewed  -------------------------------------------------------------------------------------------  Assessment and Plan:   83M with pmhx of ICM, HFrEF (LVEF 20-25%), refused defibrillator, Afib on Eliquis, T2DM, CKD3, HTN, HLD, BPH, ?COPD presents with SOB with concern for ADHF.  1. HFrEF  - Can transition Bumex to 1mg PO daily  - Repeat TTE with persistent severe LV dysfunction  - Cont Metoprolol as below, unable to start additional GDMT at this time 2/2 BP and Cr  2. A fib  - c/w home dig, eliquis toprol, please check Dig levels daily as pt with ZACHARY  3.HTN: stable  4.HLD: on statin    Flash Issa MD  Fellow Physician   Cardiology Inpatient Consult Service     Please check [email or web address] password: [email or web address]" for cardiology service schedule and contact information.

## 2023-08-17 NOTE — PROVIDER CONTACT NOTE (OTHER) - BACKGROUND
Pt admitted 8/13 for heart failure. pt previously with butcher catheter, passed TOV overnight. Pt urinating, but reporting discomfort in abdomen. Bladder scan conducted. 446mL in bladder.

## 2023-08-17 NOTE — DISCHARGE NOTE PROVIDER - NSDCFUADDAPPT_GEN_ALL_CORE_FT
APPTS ARE READY TO BE MADE: [X] YES    Best Family or Patient Contact (if needed): Jonnathan Nickerson [phone]    Additional Information about above appointments (if needed):    1: cardiology follow up for heart failure exacerbation requiring hospitalization   2:   3:     Other comments or requests:    APPTS ARE READY TO BE MADE: [X] YES    Best Family or Patient Contact (if needed): Jonnathan Nickerson [phone]    Additional Information about above appointments (if needed):    1: cardiology follow up for heart failure exacerbation requiring hospitalization   2:   3:     Other comments or requests:   Patient was scheduled on 9/1/23 at 11:40am at [address] [place] [address] with Dr. Flavio Tucker. Patient was scheduled with Dr. David Hoenig on 9/7/23 at 12:10pm at [address] [place] [address].  Scheduled on 9/1/23 at 11:40am at [facility] with Dr. Flavio Tucker. Patient was scheduled with Dr. David Hoenig on 9/7/23 at 12:10pm at [address] [place] [address].     1. Please call Dr. Hoenig for a follow up in 1 week after discharge to possibly take out your butcher.   2. Dr. Catalan and Dr. Adamson are Singaporean speaking cardiologist if you would like to switch your provider.

## 2023-08-17 NOTE — DISCHARGE NOTE PROVIDER - PROVIDER TOKENS
PROVIDER:[TOKEN:[[id number]],FOLLOWUP:[2 weeks]] PROVIDER:[TOKEN:[[id number]],SCHEDULEDAPPT:[09/01/2023],SCHEDULEDAPPTTIME:[11:40 AM]],PROVIDER:[TOKEN:[[id number]],FOLLOWUP:[1 week]],FREE:[LAST:[Your PCP],PHONE:[(   )    -],FAX:[(   )    -],FOLLOWUP:[1 week]],PROVIDER:[TOKEN:[[id number]]],PROVIDER:[TOKEN:[[id number]]]

## 2023-08-18 ENCOUNTER — TRANSCRIPTION ENCOUNTER (OUTPATIENT)
Age: 83
End: 2023-08-18

## 2023-08-18 VITALS
TEMPERATURE: 98 F | SYSTOLIC BLOOD PRESSURE: 89 MMHG | RESPIRATION RATE: 18 BRPM | HEART RATE: 81 BPM | OXYGEN SATURATION: 98 % | DIASTOLIC BLOOD PRESSURE: 46 MMHG

## 2023-08-18 LAB
CULTURE RESULTS: SIGNIFICANT CHANGE UP
CULTURE RESULTS: SIGNIFICANT CHANGE UP
DIGOXIN SERPL-MCNC: 0.4 NG/ML — LOW (ref 0.8–2)
SPECIMEN SOURCE: SIGNIFICANT CHANGE UP
SPECIMEN SOURCE: SIGNIFICANT CHANGE UP

## 2023-08-18 PROCEDURE — 99232 SBSQ HOSP IP/OBS MODERATE 35: CPT

## 2023-08-18 PROCEDURE — 99239 HOSP IP/OBS DSCHRG MGMT >30: CPT

## 2023-08-18 RX ORDER — POLYETHYLENE GLYCOL 3350 17 G/17G
17 POWDER, FOR SOLUTION ORAL
Qty: 510 | Refills: 0
Start: 2023-08-18 | End: 2023-09-16

## 2023-08-18 RX ORDER — METOPROLOL TARTRATE 50 MG
12.5 TABLET ORAL
Qty: 0 | Refills: 0 | DISCHARGE

## 2023-08-18 RX ORDER — PANTOPRAZOLE SODIUM 20 MG/1
1 TABLET, DELAYED RELEASE ORAL
Qty: 30 | Refills: 0
Start: 2023-08-18 | End: 2023-09-16

## 2023-08-18 RX ORDER — SENNA PLUS 8.6 MG/1
2 TABLET ORAL
Qty: 60 | Refills: 0
Start: 2023-08-18 | End: 2023-09-16

## 2023-08-18 RX ORDER — BUMETANIDE 0.25 MG/ML
1 INJECTION INTRAMUSCULAR; INTRAVENOUS
Qty: 0 | Refills: 0 | DISCHARGE

## 2023-08-18 RX ORDER — METOPROLOL TARTRATE 50 MG
0.5 TABLET ORAL
Qty: 15 | Refills: 0
Start: 2023-08-18 | End: 2023-09-16

## 2023-08-18 RX ADMIN — Medication 1: at 09:01

## 2023-08-18 RX ADMIN — PANTOPRAZOLE SODIUM 40 MILLIGRAM(S): 20 TABLET, DELAYED RELEASE ORAL at 05:28

## 2023-08-18 RX ADMIN — FINASTERIDE 5 MILLIGRAM(S): 5 TABLET, FILM COATED ORAL at 11:14

## 2023-08-18 RX ADMIN — APIXABAN 2.5 MILLIGRAM(S): 2.5 TABLET, FILM COATED ORAL at 05:28

## 2023-08-18 RX ADMIN — POLYETHYLENE GLYCOL 3350 17 GRAM(S): 17 POWDER, FOR SOLUTION ORAL at 11:14

## 2023-08-18 RX ADMIN — Medication 12.5 MILLIGRAM(S): at 05:28

## 2023-08-18 RX ADMIN — BUMETANIDE 1 MILLIGRAM(S): 0.25 INJECTION INTRAMUSCULAR; INTRAVENOUS at 05:28

## 2023-08-18 RX ADMIN — Medication 125 MICROGRAM(S): at 11:14

## 2023-08-18 RX ADMIN — Medication 81 MILLIGRAM(S): at 11:14

## 2023-08-18 RX ADMIN — Medication 2: at 13:14

## 2023-08-18 NOTE — PROGRESS NOTE ADULT - NSPROGADDITIONALINFOA_GEN_ALL_CORE
Son updated 8/17. undergoing TOV. UCx remain urogential tae in setting of partially treated UTI. BCx remain NGTD. Bed side urinal with clear yellow urine. d/w with daughter at bedside.
discharge 40 min

## 2023-08-18 NOTE — PROGRESS NOTE ADULT - PROBLEM SELECTOR PLAN 2
- noted flank pain on admission and urinary retention   - recently started on levaquin by PCP   - renal US with enlarged prostate w/o overt abscess   - UCx <10 K urogential tae due to previously on antibiotics   - s/p ceftriaxone 5/5  - WBC downtrend   - TOV butcher removed notes suprapubic pain unable to do straight cath for retention and butcher placed outpt f/u with urology
- noted flank pain on admission and urinary retention   - recently started on levaquin by PCP   - renal US with enlarged prostate w/o overt abscess   - UCx <10 K urogential tae due to previously on antibiotics   - c/w ceftriaxone 4/5  - WBC downtrend   - TOV
- noted flank pain on admission and urinary retention   - recently started on levaquin by PCP   - renal US with enlarged prostate w/o overt abscess   - UCx <10 K urogential tae due to previously on antibiotics   - c/w ceftriaxone 3/5  - WBC downtrend   - TOV
- noted flank pain on admission and urinary retention   - recently started on levaquin by PCP   - renal US with enlarged prostate   - UCx <10 K urogential tae  - c/w ceftriaxone
- noted flank pain on admission and urinary retention   - recently started on levaquin by PCP   - renal US with enlarged prostate w/o overt abscess   - UCx <10 K urogential tae due to previously on antibiotics   - c/w ceftriaxone 5/5  - WBC downtrend   - TOV butcher removed notes suprapubic pain check postvoid residual

## 2023-08-18 NOTE — PROGRESS NOTE ADULT - NUTRITIONAL ASSESSMENT
This patient has been assessed with a concern for Malnutrition and has been determined to have a diagnosis/diagnoses of Severe protein-calorie malnutrition and Underweight (BMI < 19).    This patient is being managed with:   Diet Consistent Carbohydrate w/Evening Snack-  DASH/TLC {Sodium & Cholesterol Restricted} (DASH)  1500mL Fluid Restriction ([id number])  Moderately Thick Liquids (MODTHICKLIQS)  Low Sodium  Kosher  No Caffeine  Entered: Aug 15 2023  6:03PM  
This patient has been assessed with a concern for Malnutrition and has been determined to have a diagnosis/diagnoses of Severe protein-calorie malnutrition and Underweight (BMI < 19).    This patient is being managed with:   Diet Consistent Carbohydrate w/Evening Snack-  DASH/TLC {Sodium & Cholesterol Restricted} (DASH)  1500mL Fluid Restriction ([id number])  Moderately Thick Liquids (MODTHICKLIQS)  Low Sodium  Kosher  No Caffeine  Entered: Aug 15 2023  6:03PM  
This patient has been assessed with a concern for Malnutrition and has been determined to have a diagnosis/diagnoses of Severe protein-calorie malnutrition and Underweight (BMI < 19).    This patient is being managed with:   Diet Consistent Carbohydrate w/Evening Snack-  DASH/TLC {Sodium & Cholesterol Restricted} (DASH)  1500mL Fluid Restriction ([id number])  Moderately Thick Liquids (MODTHICKLIQS)  Low Sodium  Kosher  No Caffeine  Entered: Aug 15 2023  6:03PM

## 2023-08-18 NOTE — PROGRESS NOTE ADULT - PROBLEM SELECTOR PLAN 3
- previous A1c 7.4 2/23  - restart home regimen on discharge
- s/p Drew catheter  - continue tamsulosin and finasteride  - renal US without stones enlarged prostate
- previous A1c 7.4 2/23  - hold linagliptin and Humalog mix  - FS primarily at goal   - c/w lantus 5 and ISS
- previous A1c 7.4 2/23  - hold linagliptin and Humalog mix  - FS slightly elevated will start lantus 5 U with ISS
- previous A1c 7.4 2/23  - hold linagliptin and Humalog mix  - FS slightly elevated will start lantus 5 U with ISS

## 2023-08-18 NOTE — DISCHARGE NOTE NURSING/CASE MANAGEMENT/SOCIAL WORK - PATIENT PORTAL LINK FT
You can access the FollowMyHealth Patient Portal offered by [facility] by registering at the following website: [email or web address]. By joining [facility]’s FollowMyHealth portal, you will also be able to view your health information using other applications (apps) compatible with our system.

## 2023-08-18 NOTE — PROGRESS NOTE ADULT - PROBLEM SELECTOR PLAN 5
- will give Lispro sliding scale  - previous A1c 7.4 2/23  - hold linagliptin and Humalog mix
- s/p Drew catheter  - continue tamsulosin and finasteride  - renal US without stones enlarged prostate
- s/p Drew catheter  - continue tamsulosin and finasteride  - renal US without stones enlarged prostate  - TOV as above
- s/p Drew catheter  - tamsulosin and finasteride  - renal US without stones enlarged prostate  - TOV failed outpt f/u urology for TOV
- s/p Drew catheter  - continue tamsulosin and finasteride  - renal US without stones enlarged prostate

## 2023-08-18 NOTE — PROGRESS NOTE ADULT - ATTENDING COMMENTS
Agree with fellow's assessment above.    Huang Contreras MD  Attending Physician   Cardiology Inpatient Consult Service     [facility] [place]   [address], [address]  [place] [address]   Tel: [phone]  Fax: [phone]    Please check [email or web address] password: "[email or web address]" for cardiology service schedule and contact information.
agree with fellow's assessment
continue IV diuresis  strict I/Os  Continue on Eliquis
agree with fellow's assessment.   Continue current GDMT as above.   On maintenance diuretic.   On discharge, please give information for [place]ian Speaking Cardiologist at [facility] Dr. Mike Catalan or Dr. Tina Adamson for followup within 2 weeks of discharge. Cardiology will sign off at this time.
agree with fellow above.

## 2023-08-18 NOTE — DISCHARGE NOTE NURSING/CASE MANAGEMENT/SOCIAL WORK - NSDCFUADDAPPT_GEN_ALL_CORE_FT
APPTS ARE READY TO BE MADE: [X] YES    Best Family or Patient Contact (if needed): Jonnathan Nickerson [phone]    Additional Information about above appointments (if needed):    1: cardiology follow up for heart failure exacerbation requiring hospitalization   2:   3:     Other comments or requests:   Patient was scheduled on 9/1/23 at 11:40am at [address] [place] [address] with Dr. Flavio Tucker. Patient was scheduled with Dr. David Hoenig on 9/7/23 at 12:10pm at [address] [place] [address].

## 2023-08-18 NOTE — PROGRESS NOTE ADULT - PROBLEM SELECTOR PLAN 1
- monitor on tele  - monitor I/O's;   - repeat TTE with  severe LV dysfunction   - change to PO diuretics as per cards  - change to metoprolol 12.5 BID due to borderline BP   - f/u cards recs
- continue IV Bumex  - monitor on tele  - monitor I/O's;   - repeat TTE with LV severe LV dysfunction   - f/u cards recs
- continue IV Bumex  - monitor on tele  - monitor I/O's   - TTE  - f/u cards recs
- continue IV Bumex  - monitor on tele  - monitor I/O's;   - repeat TTE with LV severe LV dysfunction   - f/u cards recs
- monitor on tele  - monitor I/O's;   - repeat TTE with  severe LV dysfunction   - change to PO diuretics as per cards  - metoprolol 12.5 BID due to borderline BP. outpt uptitrating of HF meds  -  [place] Speaking Cardiologist at [facility] Dr. Mike Catalan or Dr. Tina Adamson for followup within 2 weeks of discharge.

## 2023-08-18 NOTE — CHART NOTE - NSCHARTNOTEFT_GEN_A_CORE
Note    Called to place Difficult Butcher    Pt requires butcher for urinary retention    T(C): 36.3 (08-17-23 @ 12:12), Max: 36.7 (08-16-23 @ 22:30)  HR: 78 (08-17-23 @ 12:12) (78 - 91)  BP: 96/46 (08-17-23 @ 12:12) (90/45 - 108/63)  RR: 17 (08-17-23 @ 12:12) (16 - 17)  SpO2: 98% (08-17-23 @ 12:12) (98% - 99%)  Wt(kg): --    PE:  GEN:-NAD  ABD: soft NT ND  : Penis circumcised;  Scrotum WNL    Procedure:  18f coude butcher placed in aseptic fashion.  __450_cc of urine collected clear yellow color  pt tolerated well      Assessment/Plan  Continue butcher    strict I & O's  Pt can f/u with his urologist  Dr. Hoenig within 1 week for trial of void
Notified by  that patient's son Rocio wanted to speak with medical team regarding the butcher. ACP called rocio [phone] and went to voicemail. Message left to call back and will reattempt at a later time. Instructions and information regarding the butcher will be included in the discharge.     Yun Wolfe PA-C  Department of Internal Medicine  pager [phone], available on [employer] TEAMS
ACP and RN attempted to place butcher with no success, resistance met and unable to advance. Urology called and will attempt.       Yun Wolfe PA-C  Department of Internal Medicine  pager [phone], available on [employer] TEAMS
noted that patient has not been receiving Toprol doses due to hold parameters. Discussed with Cardiology, recommend trying Lopressor 12.5mg BID with hold parameter for SBP <90 given EF 20-25%.    Yun Wolfe PA-C  Department of Internal Medicine  pager [phone], available on [employer] TEAMS

## 2023-08-18 NOTE — PROGRESS NOTE ADULT - SUBJECTIVE AND OBJECTIVE BOX
[facility] Division of Hospital Medicine  Wilton Waldron MD  Pager (M-F, 8A-5P): [phone]  Other Times:  [phone]    Patient is a 83y old  Male who presents with a chief complaint of Dyspnea, urinary retention (18 Aug 2023 08:18)      SUBJECTIVE / OVERNIGHT EVENTS: pt in NAD comfortable on 2 L NC. butcher placed       MEDICATIONS  (STANDING):  apixaban 2.5 milliGRAM(s) Oral two times a day  aspirin  chewable 81 milliGRAM(s) Oral daily  buMETAnide 1 milliGRAM(s) Oral daily  dextrose 5%. 1000 milliLiter(s) (100 mL/Hr) IV Continuous <Continuous>  dextrose 5%. 1000 milliLiter(s) (50 mL/Hr) IV Continuous <Continuous>  dextrose 50% Injectable 25 Gram(s) IV Push once  dextrose 50% Injectable 12.5 Gram(s) IV Push once  dextrose 50% Injectable 25 Gram(s) IV Push once  digoxin     Tablet 125 MICROGram(s) Oral every other day  finasteride 5 milliGRAM(s) Oral daily  glucagon  Injectable 1 milliGRAM(s) IntraMuscular once  insulin glargine Injectable (LANTUS) 5 Unit(s) SubCutaneous at bedtime  insulin lispro (ADMELOG) corrective regimen sliding scale   SubCutaneous three times a day before meals  insulin lispro (ADMELOG) corrective regimen sliding scale   SubCutaneous at bedtime  metoprolol succinate ER 12.5 milliGRAM(s) Oral daily  pantoprazole    Tablet 40 milliGRAM(s) Oral before breakfast  polyethylene glycol 3350 17 Gram(s) Oral daily  senna 2 Tablet(s) Oral at bedtime  tamsulosin 0.8 milliGRAM(s) Oral at bedtime    MEDICATIONS  (PRN):  acetaminophen     Tablet .. 650 milliGRAM(s) Oral every 6 hours PRN Temp greater or equal to 38C (100.4F), Mild Pain (1 - 3)  dextrose Oral Gel 15 Gram(s) Oral once PRN Blood Glucose LESS THAN 70 milliGRAM(s)/deciliter      CAPILLARY BLOOD GLUCOSE      POCT Blood Glucose.: 219 mg/dL (18 Aug 2023 12:56)  POCT Blood Glucose.: 179 mg/dL (18 Aug 2023 08:57)  POCT Blood Glucose.: 208 mg/dL (17 Aug 2023 22:09)  POCT Blood Glucose.: 200 mg/dL (17 Aug 2023 17:50)    I&O's Summary    17 Aug 2023 07:01  -  18 Aug 2023 07:00  --------------------------------------------------------  IN: 0 mL / OUT: 1850 mL / NET: -1850 mL    18 Aug 2023 07:01  -  18 Aug 2023 13:44  --------------------------------------------------------  IN: 0 mL / OUT: 350 mL / NET: -350 mL        PHYSICAL EXAM:  Vital Signs Last 24 Hrs  T(C): 36.8 (18 Aug 2023 11:24), Max: 37.2 (18 Aug 2023 05:00)  T(F): 98.2 (18 Aug 2023 11:24), Max: 98.9 (18 Aug 2023 05:00)  HR: 81 (18 Aug 2023 11:24) (81 - 91)  BP: 89/46 (18 Aug 2023 11:24) (89/46 - 104/52)  BP(mean): --  RR: 18 (18 Aug 2023 05:00) (18 - 18)  SpO2: 78% (18 Aug 2023 11:24) (78% - 98%)    Parameters below as of 18 Aug 2023 11:24  Patient On (Oxygen Delivery Method): nasal cannula    CONSTITUTIONAL: cachetic   EYES:  conjunctiva and sclera clear  RESPIRATORY: CTA b/l    CARDIOVASCULAR: s1/s2   ABDOMEN:  normoactive bowel sounds + butcher in place with yellow urine       LABS:                        10.4   7.90  )-----------( 150      ( 17 Aug 2023 05:00 )             34.1     08-17    141  |  100  |  89<H>  ----------------------------<  165<H>  3.9   |  29  |  2.00<H>    Ca    9.5      17 Aug 2023 05:00  Phos  3.2     08-17  Mg     2.30     08-17            Urinalysis Basic - ( 17 Aug 2023 05:00 )    Color: x / Appearance: x / SG: x / pH: x  Gluc: 165 mg/dL / Ketone: x  / Bili: x / Urobili: x   Blood: x / Protein: x / Nitrite: x   Leuk Esterase: x / RBC: x / WBC x   Sq Epi: x / Non Sq Epi: x / Bacteria: x          RADIOLOGY & ADDITIONAL TESTS:  Results Reviewed:   Imaging Personally Reviewed:  Electrocardiogram Personally Reviewed:    COORDINATION OF CARE:  Care Discussed with Consultants/Other Providers [Y/N]:  Prior or Outpatient Records Reviewed [Y/N]:

## 2023-08-18 NOTE — PROGRESS NOTE ADULT - PROBLEM SELECTOR PROBLEM 5
BPH with urinary obstruction
Type 2 diabetes mellitus
BPH with urinary obstruction

## 2023-08-18 NOTE — PROGRESS NOTE ADULT - PROBLEM SELECTOR PROBLEM 4
Chronic kidney disease, unspecified CKD stage
Chronic atrial fibrillation

## 2023-08-18 NOTE — PROGRESS NOTE ADULT - PROBLEM SELECTOR PLAN 4
- baseline Cr 1.8-2.4   - US with enalrged prostate   - Cr improving with diuresis and butcher
- baseline Cr 1.8-2.4   - US with enalrged prostate   - Cr improving with diuresis and butcher
- continue Eliquis, digoxin, and metoprolol  - digoxin level as per cards
- baseline Cr 1.8-2.4   - US with enalrged prostate   - Cr improving with diuresis and butcher
- baseline Cr 1.8-2.4   - US with enalrged prostate   - Cr improving with diuresis and butcher

## 2023-08-18 NOTE — PROGRESS NOTE ADULT - PROBLEM SELECTOR PROBLEM 3
Type 2 diabetes mellitus
Type 2 diabetes mellitus
BPH with urinary obstruction
Type 2 diabetes mellitus
Type 2 diabetes mellitus

## 2023-08-18 NOTE — PROGRESS NOTE ADULT - ASSESSMENT
82 y/o M with pmh of AF on Eliquis, CHF (EF 20-25%) on 2L home O2 DM2, HTN, BPH and kidney stones p/w dyspnea and decreased urination.  
84 y/o M with pmh of AF on Eliquis, CHF (EF 20-25%) on 2L home O2 DM2, HTN, BPH and kidney stones p/w dyspnea and decreased urination and urinary retention
84 y/o M with pmh of AF on Eliquis, CHF (EF 20-25%) on 2L home O2 DM2, HTN, BPH and kidney stones p/w dyspnea and decreased urination.  
82 y/o M with pmh of AF on Eliquis, CHF (EF 20-25%) on 2L home O2 DM2, HTN, BPH and kidney stones p/w dyspnea and decreased urination.  
84 y/o M with pmh of AF on Eliquis, CHF (EF 20-25%) on 2L home O2 DM2, HTN, BPH and kidney stones p/w dyspnea and decreased urination.

## 2023-08-18 NOTE — PROGRESS NOTE ADULT - SUBJECTIVE AND OBJECTIVE BOX
Cardiology Progress Note  ------------------------------------------------------------------------------------------  SUBJECTIVE:   No events overnight. Denies CP, SOB or Palpitations.   Tele: a fib, rate controlled  -------------------------------------------------------------------------------------------  ROS:  CV: chest pain (-), palpitation (-), orthopnea (-), PND (-), edema (-)  PULM: SOB (-), cough (-), wheezing (-), hemoptysis (-).   CONST: fever (-), chills (-) or fatigue (-)  GI: abdominal distension (-), abdominal pain (-) , nausea/vomiting (-), hematemesis, (-), melena (-), hematochezia (-)  : dysuria (-), frequency (-), hematuria (-).   NEURO: numbness (-), weakness (-), dizziness (-)  MSK: myalgia (-), joint pain (-)   SKIN: itching (-), rash (-)  HEENT:  visual changes (-); vertigo or throat pain (-);  neck stiffness (-)   Psych: change in mood (-), anxiety (-), depression (-)     All other review of systems is negative unless indicated above.   -------------------------------------------------------------------------------------------  VS:  T(F): 98.9 (-), Max: 98.9 (-18)  HR: 91 (08-18) (78 - 91)  BP: 104/52 (08-18) (96/46 - 108/63)  RR: 18 (-)  SpO2: 98% ()  I&O's Summary    17 Aug 2023 07:01  -  18 Aug 2023 07:00  --------------------------------------------------------  IN: 0 mL / OUT: 1850 mL / NET: -1850 mL      ------------------------------------------------------------------------------------------  PHYSICAL EXAM:  General: No acute distress. Awake and conversant.   Eyes: Normal conjunctiva, anicteric. Round symmetric pupils.   ENT: Hearing grossly intact. No nasal discharge.   Neck: Neck is supple. No masses or thyromegaly.   Respiratory: Respirations are non-labored. No wheezing.   Skin: Warm. No rashes or ulcers.   Psych: Alert and oriented. Cooperative, Appropriate mood and affect, Normal judgment.   CV: Irregular, no MRG, No lower extremity edema.   MSK: No clubbing or cyanosis.   Neuro: Sensation and CN II-XII grossly normal.  -------------------------------------------------------------------------------------------  LABS:                          10.4   7.90  )-----------( 150      ( 17 Aug 2023 05:00 )             34.1     08    141  |  100  |  89<H>  ----------------------------<  165<H>  3.9   |  29  |  2.00<H>    Ca    9.5      17 Aug 2023 05:00  Phos  3.2       Mg     2.30     08        CARDIAC MARKERS ( 13 Aug 2023 06:25 )  44 ng/L / x     / x     / x     / x     / x      CARDIAC MARKERS ( 13 Aug 2023 04:13 )  46 ng/L / x     / x     / x     / x     / x                -------------------------------------------------------------------------------------------  Meds:  acetaminophen     Tablet .. 650 milliGRAM(s) Oral every 6 hours PRN  apixaban 2.5 milliGRAM(s) Oral two times a day  aspirin  chewable 81 milliGRAM(s) Oral daily  buMETAnide 1 milliGRAM(s) Oral daily  dextrose 5%. 1000 milliLiter(s) IV Continuous <Continuous>  dextrose 5%. 1000 milliLiter(s) IV Continuous <Continuous>  dextrose 50% Injectable 25 Gram(s) IV Push once  dextrose 50% Injectable 12.5 Gram(s) IV Push once  dextrose 50% Injectable 25 Gram(s) IV Push once  dextrose Oral Gel 15 Gram(s) Oral once PRN  digoxin     Tablet 125 MICROGram(s) Oral every other day  finasteride 5 milliGRAM(s) Oral daily  glucagon  Injectable 1 milliGRAM(s) IntraMuscular once  insulin glargine Injectable (LANTUS) 5 Unit(s) SubCutaneous at bedtime  insulin lispro (ADMELOG) corrective regimen sliding scale   SubCutaneous three times a day before meals  insulin lispro (ADMELOG) corrective regimen sliding scale   SubCutaneous at bedtime  metoprolol succinate ER 12.5 milliGRAM(s) Oral daily  pantoprazole    Tablet 40 milliGRAM(s) Oral before breakfast  polyethylene glycol 3350 17 Gram(s) Oral daily  senna 2 Tablet(s) Oral at bedtime  tamsulosin 0.8 milliGRAM(s) Oral at bedtime    -------------------------------------------------------------------------------------------  Cardiovascular Diagnostic Testing:    ECG: a fib    Echo: 2023  ------------------------------------------------------------------------  CONCLUSIONS:  1. Mitral annular calcification and calcified mitral  leaflets with normal diastolic opening. Mild mitral  regurgitation.  2. Normal trileaflet aortic valve. Minimal aortic  regurgitation.  3. Endocardial visualization enhanced with intravenous  injection of echo contrast (Definity). Severe global left  ventricular systolic dysfunction.  4. Normal right ventricular size and function.  ------------------------------------------------------------------------  Confirmed on  2023 - 15:46:11 by MARIEL Schmitt  ------------------------------------------------------------------------      Stress Testin2023  IMPRESSIONS:Abnormal Study  * Myocardial Perfusion SPECT results are abnormal.  * Review of raw data shows: The study is of good technical  quality.  * The leftventricle was markdely dilated at baseline.  There are large, severe defects in anterior and  anteroseptal, apical walls that are fixed, suggestive of  infarct.There is a medium sized, severe defect in proximal  to mid septal wall that is fixed, suggestive of infarct.  The best perfusion was in the lateral / inferolateral  wall. There was increased RV tracer uptake.  * Post-stress gated wall motion analysis was performed  (LVEF = 22 %;LVEDV = 190 ml.), revealing severe overall  hypokinesis. Therewas apical, anterior and anteroseptal  akinesis. There was proximal to mid septal akinesis. The  best motion was in the lateral and inferolateral walls. RV  size and function appeared normal.  ------------------------------------------------------------------------  Confirmed on  2023 - 18:22:21 by Gerardo Weldon M.D.  ------------------------------------------------------------------------    Cath:    Imaging:    CXR:  reviewed  -------------------------------------------------------------------------------------------  Assessment and Plan:   83M with pmhx of ICM, HFrEF (LVEF 20-25%), refused defibrillator, Afib on Eliquis, T2DM, CKD3, HTN, HLD, BPH, ?COPD presents with SOB with concern for ADHF.  1. HFrEF  - Cont Bumex to 1mg PO daily  - Repeat TTE with persistent severe LV dysfunction  - Cont Metoprolol as below, unable to start additional GDMT at this time 2/2 BP and Cr  2. A fib  - c/w home dig, eliquis toprol, please check Dig levels daily as pt with [facility]  3.HTN: stable  4.HLD: on statin    Flash Issa MD  Fellow Physician   Cardiology Inpatient Consult Service     Please check [email or web address]: [email or web address]" for cardiology service schedule and contact information.

## 2023-08-18 NOTE — PROGRESS NOTE ADULT - REASON FOR ADMISSION
Dyspnea, urinary retention

## 2023-08-18 NOTE — PROGRESS NOTE ADULT - PROVIDER SPECIALTY LIST ADULT
Cardiology
Hospitalist
Cardiology
Cardiology
Hospitalist

## 2023-08-18 NOTE — DISCHARGE NOTE NURSING/CASE MANAGEMENT/SOCIAL WORK - NSDCPEFALRISK_GEN_ALL_CORE
For information on Fall & Injury Prevention, visit: [email or web address] OR  [email or web address] OR  https://www.cdc.gov/steadi/patient.html

## 2023-08-19 ENCOUNTER — TRANSCRIPTION ENCOUNTER (OUTPATIENT)
Age: 83
End: 2023-08-19

## 2023-08-24 ENCOUNTER — APPOINTMENT (OUTPATIENT)
Dept: HEART AND VASCULAR | Facility: CLINIC | Age: 83
End: 2023-08-24

## 2023-09-01 ENCOUNTER — APPOINTMENT (OUTPATIENT)
Dept: HEART AND VASCULAR | Facility: CLINIC | Age: 83
End: 2023-09-01

## 2023-09-07 ENCOUNTER — APPOINTMENT (OUTPATIENT)
Dept: UROLOGY | Facility: CLINIC | Age: 83
End: 2023-09-07

## 2023-09-22 ENCOUNTER — APPOINTMENT (OUTPATIENT)
Dept: UROLOGY | Facility: CLINIC | Age: 83
End: 2023-09-22

## 2023-10-09 NOTE — ED PROVIDER NOTE - IV ALTEPLASE EXCL ABS HIDDEN
show [facility]ycl[facility] Counseling:  I discussed with the patient the risks of imiquimod including but not limited to erythema, scaling, itching, weeping, crusting, and pain.  Patient understands that the inflammatory response to imiquimod is variable from person to person and was educated regarded proper titration schedule.  If flu-like symptoms develop, patient knows to discontinue the medication and contact us.

## 2023-11-17 ENCOUNTER — APPOINTMENT (OUTPATIENT)
Dept: UROLOGY | Facility: CLINIC | Age: 83
End: 2023-11-17

## 2024-01-01 NOTE — PHYSICAL EXAM
[General Appearance - Well Developed] : well developed [General Appearance - Well Nourished] : well nourished [Well Groomed] : well groomed [Normal Appearance] : normal appearance [No Deformities] : no deformities [General Appearance - In No Acute Distress] : no acute distress COUGH [Normal Conjunctiva] : the conjunctiva exhibited no abnormalities [Eyelids - No Xanthelasma] : the eyelids demonstrated no xanthelasmas [No Oral Cyanosis] : no oral cyanosis [No Oral Pallor] : no oral pallor [Normal Oral Mucosa] : normal oral mucosa [Normal Jugular Venous A Waves Present] : normal jugular venous A waves present [Normal Jugular Venous V Waves Present] : normal jugular venous V waves present [Normal S2] : normal S2 [Normal S1] : normal S1 [No Jugular Venous Underwood A Waves] : no jugular venous underwood A waves [II] : a grade 2 [___ +] : bilateral [unfilled]U+ pretibial pitting edema [Bibasilar Rales/Crackles] : bibasilar rales were heard [Abdomen Soft] : soft [Abdomen Tenderness] : non-tender [] : no hepato-splenomegaly [Abdomen Mass (___ Cm)] : no abdominal mass palpated [Gait - Sufficient For Exercise Testing] : the gait was sufficient for exercise testing [Abnormal Walk] : normal gait [S4] : no S4 [S3] : no S3